# Patient Record
Sex: MALE | Race: WHITE | NOT HISPANIC OR LATINO | Employment: OTHER | URBAN - METROPOLITAN AREA
[De-identification: names, ages, dates, MRNs, and addresses within clinical notes are randomized per-mention and may not be internally consistent; named-entity substitution may affect disease eponyms.]

---

## 2023-12-29 ENCOUNTER — HOSPITAL ENCOUNTER (INPATIENT)
Facility: HOSPITAL | Age: 88
LOS: 9 days | Discharge: HOME HEALTH | End: 2024-01-07
Attending: INTERNAL MEDICINE
Payer: MEDICARE

## 2023-12-29 ENCOUNTER — APPOINTMENT (OUTPATIENT)
Dept: CARDIOLOGY | Facility: HOSPITAL | Age: 88
End: 2023-12-29
Payer: MEDICARE

## 2023-12-29 DIAGNOSIS — Z09 FOLLOW-UP EXAM: Primary | ICD-10-CM

## 2023-12-29 DIAGNOSIS — J96.01 ACUTE RESPIRATORY FAILURE WITH HYPOXIA: ICD-10-CM

## 2023-12-29 DIAGNOSIS — J90 PLEURAL EFFUSION: ICD-10-CM

## 2023-12-29 DIAGNOSIS — R06.02 SHORTNESS OF BREATH: ICD-10-CM

## 2023-12-29 PROBLEM — R79.89 ELEVATED TROPONIN: Status: ACTIVE | Noted: 2023-12-29

## 2023-12-29 PROBLEM — D64.9 ANEMIA: Status: ACTIVE | Noted: 2023-12-29

## 2023-12-29 PROBLEM — I10 HTN (HYPERTENSION): Status: ACTIVE | Noted: 2023-12-29

## 2023-12-29 PROBLEM — N18.9 CKD (CHRONIC KIDNEY DISEASE): Status: ACTIVE | Noted: 2023-12-29

## 2023-12-29 PROBLEM — I48.91 ATRIAL FIBRILLATION: Status: ACTIVE | Noted: 2023-12-29

## 2023-12-29 PROBLEM — D69.6 THROMBOCYTOPENIA: Status: ACTIVE | Noted: 2023-12-29

## 2023-12-29 PROBLEM — R07.9 CHEST PAIN: Status: ACTIVE | Noted: 2023-12-29

## 2023-12-29 PROBLEM — R91.8 LUNG MASS: Status: ACTIVE | Noted: 2023-12-29

## 2023-12-29 LAB
ANION GAP SERPL CALCULATED.3IONS-SCNC: 8.9 MMOL/L (ref 5–15)
AORTIC ARCH: 2.4 CM
AORTIC DIMENSIONLESS INDEX: 0.7 (DI)
ASCENDING AORTA: 3.2 CM
BASOPHILS # BLD AUTO: 0.05 10*3/MM3 (ref 0–0.2)
BASOPHILS NFR BLD AUTO: 0.7 % (ref 0–1.5)
BH CV ECHO MEAS - ACS: 2.3 CM
BH CV ECHO MEAS - AO MAX PG: 15.8 MMHG
BH CV ECHO MEAS - AO MEAN PG: 7.3 MMHG
BH CV ECHO MEAS - AO ROOT DIAM: 3.5 CM
BH CV ECHO MEAS - AO V2 MAX: 198.9 CM/SEC
BH CV ECHO MEAS - AO V2 VTI: 39.6 CM
BH CV ECHO MEAS - AVA(I,D): 3.3 CM2
BH CV ECHO MEAS - EDV(CUBED): 188.8 ML
BH CV ECHO MEAS - EDV(MOD-SP2): 158 ML
BH CV ECHO MEAS - EDV(MOD-SP4): 253 ML
BH CV ECHO MEAS - EF(MOD-BP): 40.4 %
BH CV ECHO MEAS - EF(MOD-SP2): 31.6 %
BH CV ECHO MEAS - EF(MOD-SP4): 42.7 %
BH CV ECHO MEAS - ESV(CUBED): 116.2 ML
BH CV ECHO MEAS - ESV(MOD-SP2): 108 ML
BH CV ECHO MEAS - ESV(MOD-SP4): 145 ML
BH CV ECHO MEAS - FS: 14.9 %
BH CV ECHO MEAS - IVS/LVPW: 1.28 CM
BH CV ECHO MEAS - IVSD: 1.35 CM
BH CV ECHO MEAS - LAT PEAK E' VEL: 11.3 CM/SEC
BH CV ECHO MEAS - LV DIASTOLIC VOL/BSA (35-75): 118.3 CM2
BH CV ECHO MEAS - LV MASS(C)D: 292 GRAMS
BH CV ECHO MEAS - LV MAX PG: 6.8 MMHG
BH CV ECHO MEAS - LV MEAN PG: 4 MMHG
BH CV ECHO MEAS - LV SYSTOLIC VOL/BSA (12-30): 67.8 CM2
BH CV ECHO MEAS - LV V1 MAX: 130 CM/SEC
BH CV ECHO MEAS - LV V1 VTI: 28.6 CM
BH CV ECHO MEAS - LVIDD: 5.7 CM
BH CV ECHO MEAS - LVIDS: 4.9 CM
BH CV ECHO MEAS - LVOT AREA: 4.6 CM2
BH CV ECHO MEAS - LVOT DIAM: 2.41 CM
BH CV ECHO MEAS - LVPWD: 1.06 CM
BH CV ECHO MEAS - MED PEAK E' VEL: 6.1 CM/SEC
BH CV ECHO MEAS - MV A DUR: 0.11 SEC
BH CV ECHO MEAS - MV A MAX VEL: 116.9 CM/SEC
BH CV ECHO MEAS - MV DEC SLOPE: 366.8 CM/SEC2
BH CV ECHO MEAS - MV DEC TIME: 0.25 SEC
BH CV ECHO MEAS - MV E MAX VEL: 82.3 CM/SEC
BH CV ECHO MEAS - MV E/A: 0.7
BH CV ECHO MEAS - MV MAX PG: 6.9 MMHG
BH CV ECHO MEAS - MV MEAN PG: 2.7 MMHG
BH CV ECHO MEAS - MV P1/2T: 101.2 MSEC
BH CV ECHO MEAS - MV V2 VTI: 36.4 CM
BH CV ECHO MEAS - MVA(P1/2T): 2.17 CM2
BH CV ECHO MEAS - MVA(VTI): 3.6 CM2
BH CV ECHO MEAS - PA ACC TIME: 0.11 SEC
BH CV ECHO MEAS - PA V2 MAX: 150.8 CM/SEC
BH CV ECHO MEAS - PULM A REVS DUR: 0.13 SEC
BH CV ECHO MEAS - PULM A REVS VEL: 36.9 CM/SEC
BH CV ECHO MEAS - PULM DIAS VEL: 40.5 CM/SEC
BH CV ECHO MEAS - PULM S/D: 1.24
BH CV ECHO MEAS - PULM SYS VEL: 50.1 CM/SEC
BH CV ECHO MEAS - QP/QS: 0.86
BH CV ECHO MEAS - RAP SYSTOLE: 3 MMHG
BH CV ECHO MEAS - RV MAX PG: 4.1 MMHG
BH CV ECHO MEAS - RV V1 MAX: 101.1 CM/SEC
BH CV ECHO MEAS - RV V1 VTI: 18.9 CM
BH CV ECHO MEAS - RVOT DIAM: 2.7 CM
BH CV ECHO MEAS - RVSP: 45 MMHG
BH CV ECHO MEAS - SI(MOD-SP2): 23.4 ML/M2
BH CV ECHO MEAS - SI(MOD-SP4): 50.5 ML/M2
BH CV ECHO MEAS - SV(LVOT): 130 ML
BH CV ECHO MEAS - SV(MOD-SP2): 50 ML
BH CV ECHO MEAS - SV(MOD-SP4): 108 ML
BH CV ECHO MEAS - SV(RVOT): 111.4 ML
BH CV ECHO MEAS - TR MAX PG: 42 MMHG
BH CV ECHO MEAS - TR MAX VEL: 324 CM/SEC
BH CV ECHO MEASUREMENTS AVERAGE E/E' RATIO: 9.46
BH CV ECHO SHUNT ASSESSMENT PERFORMED (HIDDEN SCRIPTING): 1
BH CV XLRA - RV BASE: 4.6 CM
BH CV XLRA - RV LENGTH: 8.6 CM
BH CV XLRA - RV MID: 4.5 CM
BH CV XLRA - TDI S': 20.8 CM/SEC
BUN SERPL-MCNC: 32 MG/DL (ref 8–23)
BUN/CREAT SERPL: 21.3 (ref 7–25)
CALCIUM SPEC-SCNC: 8.9 MG/DL (ref 8.6–10.5)
CHLORIDE SERPL-SCNC: 105 MMOL/L (ref 98–107)
CO2 SERPL-SCNC: 25.1 MMOL/L (ref 22–29)
CREAT SERPL-MCNC: 1.5 MG/DL (ref 0.76–1.27)
DEPRECATED RDW RBC AUTO: 54 FL (ref 37–54)
EGFRCR SERPLBLD CKD-EPI 2021: 44.2 ML/MIN/1.73
EOSINOPHIL # BLD AUTO: 0.19 10*3/MM3 (ref 0–0.4)
EOSINOPHIL NFR BLD AUTO: 2.6 % (ref 0.3–6.2)
ERYTHROCYTE [DISTWIDTH] IN BLOOD BY AUTOMATED COUNT: 14.9 % (ref 12.3–15.4)
GEN 5 2HR TROPONIN T REFLEX: 508 NG/L
GLUCOSE SERPL-MCNC: 98 MG/DL (ref 65–99)
HCT VFR BLD AUTO: 36.4 % (ref 37.5–51)
HGB BLD-MCNC: 12.5 G/DL (ref 13–17.7)
IMM GRANULOCYTES # BLD AUTO: 0.03 10*3/MM3 (ref 0–0.05)
IMM GRANULOCYTES NFR BLD AUTO: 0.4 % (ref 0–0.5)
INR PPP: 3.6 (ref 0.9–1.1)
L PNEUMO1 AG UR QL IA: NEGATIVE
LEFT ATRIUM VOLUME INDEX: 45.2 ML/M2
LYMPHOCYTES # BLD AUTO: 0.88 10*3/MM3 (ref 0.7–3.1)
LYMPHOCYTES NFR BLD AUTO: 11.9 % (ref 19.6–45.3)
MAGNESIUM SERPL-MCNC: 2.2 MG/DL (ref 1.6–2.4)
MCH RBC QN AUTO: 33.7 PG (ref 26.6–33)
MCHC RBC AUTO-ENTMCNC: 34.3 G/DL (ref 31.5–35.7)
MCV RBC AUTO: 98.1 FL (ref 79–97)
MONOCYTES # BLD AUTO: 0.47 10*3/MM3 (ref 0.1–0.9)
MONOCYTES NFR BLD AUTO: 6.4 % (ref 5–12)
MRSA DNA SPEC QL NAA+PROBE: NORMAL
NEUTROPHILS NFR BLD AUTO: 5.76 10*3/MM3 (ref 1.7–7)
NEUTROPHILS NFR BLD AUTO: 78 % (ref 42.7–76)
NRBC BLD AUTO-RTO: 0 /100 WBC (ref 0–0.2)
PLATELET # BLD AUTO: 105 10*3/MM3 (ref 140–450)
PMV BLD AUTO: 10.5 FL (ref 6–12)
POTASSIUM SERPL-SCNC: 4.3 MMOL/L (ref 3.5–5.2)
PROCALCITONIN SERPL-MCNC: 0.15 NG/ML (ref 0–0.25)
PROTHROMBIN TIME: 36.8 SECONDS (ref 11.7–14.2)
QT INTERVAL: 425 MS
QTC INTERVAL: 459 MS
RBC # BLD AUTO: 3.71 10*6/MM3 (ref 4.14–5.8)
S PNEUM AG SPEC QL LA: NEGATIVE
SINUS: 3.1 CM
SODIUM SERPL-SCNC: 139 MMOL/L (ref 136–145)
STJ: 2.6 CM
TROPONIN T DELTA: 35 NG/L
TROPONIN T SERPL HS-MCNC: 473 NG/L
WBC NRBC COR # BLD AUTO: 7.38 10*3/MM3 (ref 3.4–10.8)

## 2023-12-29 PROCEDURE — 25810000003 SODIUM CHLORIDE 0.9 % SOLUTION: Performed by: NURSE PRACTITIONER

## 2023-12-29 PROCEDURE — 85025 COMPLETE CBC W/AUTO DIFF WBC: CPT | Performed by: NURSE PRACTITIONER

## 2023-12-29 PROCEDURE — 87449 NOS EACH ORGANISM AG IA: CPT | Performed by: STUDENT IN AN ORGANIZED HEALTH CARE EDUCATION/TRAINING PROGRAM

## 2023-12-29 PROCEDURE — 99222 1ST HOSP IP/OBS MODERATE 55: CPT | Performed by: INTERNAL MEDICINE

## 2023-12-29 PROCEDURE — 93306 TTE W/DOPPLER COMPLETE: CPT

## 2023-12-29 PROCEDURE — 84484 ASSAY OF TROPONIN QUANT: CPT | Performed by: NURSE PRACTITIONER

## 2023-12-29 PROCEDURE — 93010 ELECTROCARDIOGRAM REPORT: CPT | Performed by: INTERNAL MEDICINE

## 2023-12-29 PROCEDURE — 80048 BASIC METABOLIC PNL TOTAL CA: CPT | Performed by: NURSE PRACTITIONER

## 2023-12-29 PROCEDURE — 97162 PT EVAL MOD COMPLEX 30 MIN: CPT

## 2023-12-29 PROCEDURE — 87899 AGENT NOS ASSAY W/OPTIC: CPT | Performed by: STUDENT IN AN ORGANIZED HEALTH CARE EDUCATION/TRAINING PROGRAM

## 2023-12-29 PROCEDURE — 83735 ASSAY OF MAGNESIUM: CPT | Performed by: NURSE PRACTITIONER

## 2023-12-29 PROCEDURE — 87641 MR-STAPH DNA AMP PROBE: CPT | Performed by: STUDENT IN AN ORGANIZED HEALTH CARE EDUCATION/TRAINING PROGRAM

## 2023-12-29 PROCEDURE — 25510000001 PERFLUTREN (DEFINITY) 8.476 MG IN SODIUM CHLORIDE (PF) 0.9 % 10 ML INJECTION: Performed by: INTERNAL MEDICINE

## 2023-12-29 PROCEDURE — 97530 THERAPEUTIC ACTIVITIES: CPT

## 2023-12-29 PROCEDURE — 93005 ELECTROCARDIOGRAM TRACING: CPT | Performed by: NURSE PRACTITIONER

## 2023-12-29 PROCEDURE — 85610 PROTHROMBIN TIME: CPT | Performed by: INTERNAL MEDICINE

## 2023-12-29 PROCEDURE — 93306 TTE W/DOPPLER COMPLETE: CPT | Performed by: INTERNAL MEDICINE

## 2023-12-29 PROCEDURE — 84145 PROCALCITONIN (PCT): CPT | Performed by: STUDENT IN AN ORGANIZED HEALTH CARE EDUCATION/TRAINING PROGRAM

## 2023-12-29 RX ORDER — SODIUM CHLORIDE 9 MG/ML
100 INJECTION, SOLUTION INTRAVENOUS CONTINUOUS
Status: DISCONTINUED | OUTPATIENT
Start: 2023-12-29 | End: 2023-12-29

## 2023-12-29 RX ORDER — POLYETHYLENE GLYCOL 3350 17 G/17G
17 POWDER, FOR SOLUTION ORAL DAILY PRN
Status: DISCONTINUED | OUTPATIENT
Start: 2023-12-29 | End: 2024-01-07 | Stop reason: HOSPADM

## 2023-12-29 RX ORDER — WARFARIN SODIUM 3 MG/1
0.5 TABLET ORAL DAILY
COMMUNITY
Start: 2023-12-07 | End: 2024-01-07 | Stop reason: HOSPADM

## 2023-12-29 RX ORDER — ACETAMINOPHEN 650 MG/1
650 SUPPOSITORY RECTAL EVERY 4 HOURS PRN
Status: DISCONTINUED | OUTPATIENT
Start: 2023-12-29 | End: 2024-01-07 | Stop reason: HOSPADM

## 2023-12-29 RX ORDER — SODIUM CHLORIDE 9 MG/ML
40 INJECTION, SOLUTION INTRAVENOUS AS NEEDED
Status: DISCONTINUED | OUTPATIENT
Start: 2023-12-29 | End: 2024-01-07 | Stop reason: HOSPADM

## 2023-12-29 RX ORDER — ASPIRIN 81 MG/1
81 TABLET ORAL DAILY
Status: DISCONTINUED | OUTPATIENT
Start: 2023-12-29 | End: 2024-01-07 | Stop reason: HOSPADM

## 2023-12-29 RX ORDER — FUROSEMIDE 20 MG/1
TABLET ORAL
COMMUNITY
Start: 2023-12-07 | End: 2024-01-07 | Stop reason: HOSPADM

## 2023-12-29 RX ORDER — ALLOPURINOL 100 MG/1
100 TABLET ORAL DAILY
COMMUNITY
Start: 2023-12-07

## 2023-12-29 RX ORDER — ATENOLOL 25 MG/1
12.5 TABLET ORAL 2 TIMES DAILY
COMMUNITY
Start: 2023-12-26 | End: 2024-01-07 | Stop reason: HOSPADM

## 2023-12-29 RX ORDER — BISACODYL 10 MG
10 SUPPOSITORY, RECTAL RECTAL DAILY PRN
Status: DISCONTINUED | OUTPATIENT
Start: 2023-12-29 | End: 2024-01-07 | Stop reason: HOSPADM

## 2023-12-29 RX ORDER — ACETAMINOPHEN 325 MG/1
650 TABLET ORAL EVERY 4 HOURS PRN
Status: DISCONTINUED | OUTPATIENT
Start: 2023-12-29 | End: 2024-01-07 | Stop reason: HOSPADM

## 2023-12-29 RX ORDER — SODIUM CHLORIDE 0.9 % (FLUSH) 0.9 %
10 SYRINGE (ML) INJECTION AS NEEDED
Status: DISCONTINUED | OUTPATIENT
Start: 2023-12-29 | End: 2024-01-07 | Stop reason: HOSPADM

## 2023-12-29 RX ORDER — ACETAMINOPHEN 160 MG/5ML
650 SOLUTION ORAL EVERY 4 HOURS PRN
Status: DISCONTINUED | OUTPATIENT
Start: 2023-12-29 | End: 2024-01-07 | Stop reason: HOSPADM

## 2023-12-29 RX ORDER — BISACODYL 5 MG/1
5 TABLET, DELAYED RELEASE ORAL DAILY PRN
Status: DISCONTINUED | OUTPATIENT
Start: 2023-12-29 | End: 2024-01-07 | Stop reason: HOSPADM

## 2023-12-29 RX ORDER — AMOXICILLIN 250 MG
2 CAPSULE ORAL 2 TIMES DAILY
Status: DISCONTINUED | OUTPATIENT
Start: 2023-12-29 | End: 2024-01-07 | Stop reason: HOSPADM

## 2023-12-29 RX ORDER — ONDANSETRON 2 MG/ML
4 INJECTION INTRAMUSCULAR; INTRAVENOUS EVERY 6 HOURS PRN
Status: DISCONTINUED | OUTPATIENT
Start: 2023-12-29 | End: 2024-01-07 | Stop reason: HOSPADM

## 2023-12-29 RX ORDER — ATENOLOL 25 MG/1
12.5 TABLET ORAL 2 TIMES DAILY
Status: DISCONTINUED | OUTPATIENT
Start: 2023-12-29 | End: 2023-12-29

## 2023-12-29 RX ORDER — SODIUM CHLORIDE 0.9 % (FLUSH) 0.9 %
10 SYRINGE (ML) INJECTION EVERY 12 HOURS SCHEDULED
Status: DISCONTINUED | OUTPATIENT
Start: 2023-12-29 | End: 2024-01-07 | Stop reason: HOSPADM

## 2023-12-29 RX ORDER — ALLOPURINOL 100 MG/1
100 TABLET ORAL DAILY
Status: DISCONTINUED | OUTPATIENT
Start: 2023-12-29 | End: 2024-01-07 | Stop reason: HOSPADM

## 2023-12-29 RX ORDER — MONTELUKAST SODIUM 10 MG/1
10 TABLET ORAL NIGHTLY
COMMUNITY
Start: 2023-12-07

## 2023-12-29 RX ORDER — SPIRONOLACTONE 25 MG/1
25 TABLET ORAL DAILY
COMMUNITY
Start: 2023-11-17 | End: 2024-01-07 | Stop reason: HOSPADM

## 2023-12-29 RX ORDER — NITROGLYCERIN 0.4 MG/1
0.4 TABLET SUBLINGUAL
Status: DISCONTINUED | OUTPATIENT
Start: 2023-12-29 | End: 2024-01-07 | Stop reason: HOSPADM

## 2023-12-29 RX ORDER — CARVEDILOL 12.5 MG/1
12.5 TABLET ORAL 2 TIMES DAILY WITH MEALS
Status: DISCONTINUED | OUTPATIENT
Start: 2023-12-29 | End: 2024-01-07 | Stop reason: HOSPADM

## 2023-12-29 RX ADMIN — Medication 10 ML: at 11:14

## 2023-12-29 RX ADMIN — ALLOPURINOL 100 MG: 100 TABLET ORAL at 11:09

## 2023-12-29 RX ADMIN — SODIUM CHLORIDE 100 ML/HR: 9 INJECTION, SOLUTION INTRAVENOUS at 05:58

## 2023-12-29 RX ADMIN — ATENOLOL 12.5 MG: 25 TABLET ORAL at 11:09

## 2023-12-29 RX ADMIN — CARVEDILOL 12.5 MG: 12.5 TABLET, FILM COATED ORAL at 20:04

## 2023-12-29 RX ADMIN — Medication 10 ML: at 20:08

## 2023-12-29 RX ADMIN — PERFLUTREN 2 ML: 6.52 INJECTION, SUSPENSION INTRAVENOUS at 12:56

## 2023-12-29 RX ADMIN — ASPIRIN 81 MG: 81 TABLET, COATED ORAL at 11:09

## 2023-12-29 NOTE — PLAN OF CARE
Goal Outcome Evaluation:         Pt very pleasant; no c/o pain; VSS; A/Ox4; POC is waiting for INR to lower for possible C Cath; per MD notes - Pulm expected to repeat CT to confirm new mass in L lung; Family in room; anticipate pt be stay at least 3 days;    Continue to monitor

## 2023-12-29 NOTE — H&P
Patient Name:  Derian Hendricks  YOB: 1934  MRN:  [id number]  Admit Date:  12/29/2023  Patient Care Team:  Provider, No Known as PCP - General      Subjective   History Present Illness     Mr. Hendricks is a 89 y.o. male with a history of CHF, CAD, diabetes, GERD, and HTN that presents to [facility] complaining of shortness of breath.  The patient was initially seen at an outlying facility for shortness of breath.  States that the shortness of breath has progressed over the past week.  He has had some intermittent chest tightness, but is not having it currently.  States that he is coughing some but cough is nonproductive.  No fevers or chills.  Has been significant weight loss.  No vomiting or diarrhea.  The patient has never smoked.  At the outside hospital, the patient was found to have a large right pleural effusion, left lung mass, and elevated troponin.  Initial troponin was reportedly 743 with repeat 823.  Reportedly no significant EKG changes.  CTA chest was attempted but the IV unfortunately did not last.  Given the elevated troponin and abnormal lung findings, the patient was transferred to [facility] for admission and evaluation by pulmonology and cardiology.    Personal History     Past Medical History:   Diagnosis Date   • CHF (congestive heart failure)    • Coronary artery disease    • Diabetes mellitus    • GERD (gastroesophageal reflux disease)    • Gout    • Hypertension      Past Surgical History:   Procedure Laterality Date   • CHOLECYSTECTOMY     • CORONARY ANGIOPLASTY       History reviewed. No pertinent family history.  Social History     Tobacco Use   • Smoking status: Never     Passive exposure: Never   • Smokeless tobacco: Never   Vaping Use   • Vaping Use: Never used   Substance Use Topics   • Alcohol use: Never   • Drug use: Never     No current facility-administered medications on file prior to encounter.     Current Outpatient Medications  on File Prior to Encounter   Medication Sig Dispense Refill   • allopurinol (ZYLOPRIM) 100 MG tablet Take 1 tablet by mouth Daily.     • atenolol (TENORMIN) 25 MG tablet Take 0.5 tablets by mouth 2 (Two) Times a Day.     • furosemide (LASIX) 20 MG tablet 1 TAB ORAL TWICE A DAY     • metFORMIN (GLUCOPHAGE) 500 MG tablet Take 1 tablet by mouth Daily With Breakfast.     • montelukast (SINGULAIR) 10 MG tablet Take 1 tablet by mouth Every Night.     • spironolactone (ALDACTONE) 25 MG tablet Take 1 tablet by mouth Daily.     • warfarin (COUMADIN) 3 MG tablet Take 0.5 mg by mouth Daily.       No Known Allergies    Objective    Objective     Vital Signs  Temp:  [98 °F (36.7 °C)-98.8 °F (37.1 °C)] 98.8 °F (37.1 °C)  Heart Rate:  [67-90] 73  Resp:  [14-18] 14  BP: (133-141)/(52-70) 133/52  SpO2:  [94 %-99 %] 98 %  on  Flow (L/min):  [3] 3;   Device (Oxygen Therapy): nasal cannula  Body mass index is 32.18 kg/m².    Physical Exam  General: Alert, no acute distress.  Lying in bed.  Comfortable appearing.  Nasal cannula in place.  ENT: No conjunctival injection or scleral icterus. Moist mucous membranes.   Neuro: Eyes open and moving in all directions, strength normal in all extremities, no focal deficits.   Lungs: Diminished breath sounds bilaterally.  No focal crackles or wheezes noted.  Overall comfortable appearing.  Heart: RRR, no murmurs.   Abdomen: Soft, non-tender, non-distended. Normal bowel sounds.   Ext: 1+ bilateral lower extremity edema, chronic venous stasis changes.  Skin: No rashes or lesions. IV site without swelling or erythema.     Lab Results (last 24 hours)       Procedure Component Value Units Date/Time    Basic Metabolic Panel [[id number]]  (Abnormal) Collected: 12/29/23 0536    Specimen: Blood Updated: 12/29/23 0608     Glucose 98 mg/dL      BUN 32 mg/dL      Creatinine 1.50 mg/dL      Sodium 139 mmol/L      Potassium 4.3 mmol/L      Chloride 105 mmol/L      CO2 25.1 mmol/L      Calcium 8.9 mg/dL       BUN/Creatinine Ratio 21.3     Anion Gap 8.9 mmol/L      eGFR 44.2 mL/min/1.73     Narrative:      GFR Normal >60  Chronic Kidney Disease <60  Kidney Failure <15    The GFR formula is only valid for adults with stable renal function between ages 18 and 70.    CBC Auto Differential [[id number]]  (Abnormal) Collected: 12/29/23 0536    Specimen: Blood Updated: 12/29/23 0549     WBC 7.38 10*3/mm3      RBC 3.71 10*6/mm3      Hemoglobin 12.5 g/dL      Hematocrit 36.4 %      MCV 98.1 fL      MCH 33.7 pg      MCHC 34.3 g/dL      RDW 14.9 %      RDW-SD 54.0 fl      MPV 10.5 fL      Platelets 105 10*3/mm3      Neutrophil % 78.0 %      Lymphocyte % 11.9 %      Monocyte % 6.4 %      Eosinophil % 2.6 %      Basophil % 0.7 %      Immature Grans % 0.4 %      Neutrophils, Absolute 5.76 10*3/mm3      Lymphocytes, Absolute 0.88 10*3/mm3      Monocytes, Absolute 0.47 10*3/mm3      Eosinophils, Absolute 0.19 10*3/mm3      Basophils, Absolute 0.05 10*3/mm3      Immature Grans, Absolute 0.03 10*3/mm3      nRBC 0.0 /100 WBC     Magnesium [[id number]]  (Normal) Collected: 12/29/23 0536    Specimen: Blood Updated: 12/29/23 0608     Magnesium 2.2 mg/dL     High Sensitivity Troponin T [[id number]]  (Abnormal) Collected: 12/29/23 0536    Specimen: Blood Updated: 12/29/23 0612     HS Troponin T 473 ng/L     Narrative:      High Sensitive Troponin T Reference Range:  <14.0 ng/L- Negative Female for AMI  <22.0 ng/L- Negative Male for AMI  >=14 - Abnormal Female indicating possible myocardial injury.  >=22 - Abnormal Male indicating possible myocardial injury.   Clinicians would have to utilize clinical acumen, EKG, Troponin, and serial changes to determine if it is an Acute Myocardial Infarction or myocardial injury due to an underlying chronic condition.         High Sensitivity Troponin T 2Hr [[id number]]  (Abnormal) Collected: 12/29/23 0728    Specimen: Blood Updated: 12/29/23 0830     HS Troponin T 508 ng/L      Troponin T Delta 35 ng/L      Narrative:      High Sensitive Troponin T Reference Range:  <14.0 ng/L- Negative Female for AMI  <22.0 ng/L- Negative Male for AMI  >=14 - Abnormal Female indicating possible myocardial injury.  >=22 - Abnormal Male indicating possible myocardial injury.   Clinicians would have to utilize clinical acumen, EKG, Troponin, and serial changes to determine if it is an Acute Myocardial Infarction or myocardial injury due to an underlying chronic condition.                 Imaging Results (Last 24 Hours)       ** No results found for the last 24 hours. **              ECG 12 Lead Chest Pain   Preliminary Result   HEART RATE= 70  bpm   RR Interval= 857  ms   WV Interval= 178  ms   P Horizontal Axis= -4  deg   P Front Axis= 22  deg   QRSD Interval= 155  ms   QT Interval= 425  ms   QTcB= 459  ms   QRS Axis= 48  deg   T Wave Axis= -1  deg   - ABNORMAL ECG -   Sinus rhythm   Atrial premature complex   IVCD, consider atypical LBBB   Electronically Signed By:    Date and Time of Study: 2023-12-29 04:50:24      SCANNED - TELEMETRY     Final Result        Assessment/Plan   Assessment & Plan   Active Hospital Problems    Diagnosis  POA   • **Acute respiratory failure with hypoxia [J96.01]  Unknown   • Chest pain [R07.9]  Yes   • Pleural effusion [J90]  Yes   • CKD (chronic kidney disease) [N18.9]  Yes   • HTN (hypertension) [I10]  Unknown   • Shortness of breath [R06.02]  Yes   • Lung mass [R91.8]  Unknown   • Elevated troponin [R79.89]  Unknown   • Atrial fibrillation [I48.91]  Unknown   • Anemia [D64.9]  Unknown   • Thrombocytopenia [D69.6]  Unknown      Resolved Hospital Problems   No resolved problems to display.       89 y.o. male admitted with Acute respiratory failure with hypoxia.    Acute hypoxic respiratory failure  Shortness of breath  Pleural effusion  New lung mass  -Imaging from outside hospital reportedly with new left lung mass and large right-sided pleural effusion  -Currently on 3 L nasal cannula, oxygen  supplementation as needed to maintain saturations above 90%  -WBC normal, patient afebrile so hold off on antibiotics for now  -Will check procalcitonin, strep pneumo, Legionella, and MRSA nares  -Attempting to obtain imaging from outside hospital    Elevated troponin  Chest pain  -EKG here with possible LBBB, no priors for comparison  -Troponin elevated at 473, increased to 508 on recheck  -Cardiology consulted, evaluation pending    CKD  -Creatinine 1.50 on arrival, unknown baseline  -Avoid nephrotoxic agents including contrast and NSAIDs  -Patient reportedly received Lasix and some contrast before IV went bad  -Closely monitor renal function with daily BMP  -If heart catheterization is needed, will start some gentle IV fluids    HTN  Atrial fibrillation  CHF  -BP acceptable acutely  -Continue atenolol  -Hold warfarin, as patient may need procedure  -Hold Lasix and spironolactone for now  -Cardiology consulted as above, appreciate their assistance  -Will likely need repeat echocardiogram but will await cardiology evaluation  -Check INR     Anemia  Thrombocytopenia  -Hgb 12.5, platelets 105 on arrival  -Unknown what patient's baseline is, prior labs in our system  -No signs of active bleeding  -Monitor with daily CBC, transfuse for Hgb less than 7    SCDs for DVT prophylaxis.  Full code.  Discussed with patient, family, and nursing staff.      María Ordaz MD  [facility]  12/29/23  09:24 EST

## 2023-12-29 NOTE — PLAN OF CARE
Goal Outcome Evaluation:  Plan of Care Reviewed With: patient, family           Outcome Evaluation: Pt is a 89 y/o M who presented to [facility] with c/o chest pain and found with pleural effusion, subsequently transferred to [facility] for further evaluation. Pt reports living alone, being independent at baseline without use of AD inside the home, but does use a STC when outside of the home and has good family support. Pt is currently SBA for bed mobility, STS and ambulation of 120' without AD. Pt was on 2L O2 via NC on PT arrival and trialed sitting/standing on room air, however pt desatted to 88% with static standing. Pt was placed back on 2L O2 NC with quick recovery to 92-95% and ambulated into the hallway. During ambulation on 2L pts SpO2 dropped to 88% at the lowest, but had quick recovery into 90s once seated/resting. Pt reports feeling at his baseline other than the mild SOB and chest tightness. Pt does present with small decrease in endurance and strength limiting his mobility and would benefit from skilled PT services to address these deficits. Currently rec home w/ family assist. Discussed HH PT, but pt politely declined. Educated on how to obtain referral for HH PT once home.      Anticipated Discharge Disposition (PT): home with assist

## 2023-12-29 NOTE — THERAPY EVALUATION
Patient Name: Derian Hendricks  : 1934    MRN: [id number]                              Today's Date: 2023       Admit Date: 2023    Visit Dx: No diagnosis found.  Patient Active Problem List   Diagnosis    Chest pain    Pleural effusion    CKD (chronic kidney disease)    HTN (hypertension)    Shortness of breath    Acute respiratory failure with hypoxia    Lung mass    Elevated troponin    Atrial fibrillation    Anemia    Thrombocytopenia     Past Medical History:   Diagnosis Date    Atrial fibrillation     CHF (congestive heart failure)     Coronary artery disease     Diabetes mellitus     GERD (gastroesophageal reflux disease)     Gout     Hypertension      Past Surgical History:   Procedure Laterality Date    CHOLECYSTECTOMY      CORONARY ANGIOPLASTY        General Information       Row Name 23 1511          Physical Therapy Time and Intention    Document Type evaluation  -MG     Mode of Treatment individual therapy;physical therapy  -MG       Row Name 23 1511          General Information    Patient Profile Reviewed yes  -MG     Prior Level of Function independent:  No AD inside the home. Uses STC outside of the house.  -MG     Existing Precautions/Restrictions oxygen therapy device and L/min  Only wears O2 at night. Currently on 2L O2 via NC.  -MG     Barriers to Rehab medically complex  -MG       Row Name 23 1511          Living Environment    People in Home alone  -MG       Row Name 23 1511          Home Main Entrance    Number of Stairs, Main Entrance one  -MG     Stair Railings, Main Entrance railings safe and in good condition;railing on right side (ascending)  -MG       Row Name 23 1511          Stairs Within Home, Primary    Number of Stairs, Within Home, Primary none  -MG       Row Name 23 1511          Cognition    Orientation Status (Cognition) oriented x 4  -MG       Row Name 23 1511          Safety Issues, Functional Mobility     Impairments Affecting Function (Mobility) endurance/activity tolerance;shortness of breath;strength  -MG     Comment, Safety Issues/Impairments (Mobility) Gait belt and non-skid socks donned.  -MG               User Key  (r) = Recorded By, (t) = Taken By, (c) = Cosigned By      Initials Name Provider Type    Ignacia Elder PT Physical Therapist                   Mobility       Row Name 12/29/23 1512          Bed Mobility    Bed Mobility supine-sit  -MG     Supine-Sit [place] (Bed Mobility) standby assist  -MG     Assistive Device (Bed Mobility) head of bed elevated  -MG     Comment, (Bed Mobility) No dizziness on sitting.  -MG       Row Name 12/29/23 1512          Sit-Stand Transfer    Sit-Stand [place] (Transfers) standby assist  -MG     Assistive Device (Sit-Stand Transfers) other (see comments)  no AD  -MG     Comment, (Sit-Stand Transfer) x2 from EOB. On room air initially satting 92%, dropped to 88% with static standing. Placed on 2L prior to 2nd stand and maintaining 92-95%.  -MG       Row Name 12/29/23 1512          Gait/Stairs (Locomotion)    [place] Level (Gait) contact guard;standby assist  -MG     Assistive Device (Gait) other (see comments)  No AD  -MG     Distance in Feet (Gait) 120  -MG     Deviations/Abnormal Patterns (Gait) gait speed decreased;stride length decreased  -MG     Bilateral Gait Deviations forward flexed posture;heel strike decreased  -MG     Comment, (Gait/Stairs) Pt amb in hallway on 2L O2 via NC, dropping to 88%, but quick recovery once seated/resting. Pt had no c/o SOB, but did state having some chest tightness that is similar to what he has been having. No dizziness, knee buckling or LOB.  -MG               User Key  (r) = Recorded By, (t) = Taken By, (c) = Cosigned By      Initials Name Provider Type    Ignacia Elder PT Physical Therapist                   Obj/Interventions       Row Name 12/29/23 1516          Range of Motion Comprehensive    General Range  of Motion bilateral lower extremity ROM WFL  -MG       Row Name 12/29/23 1516          Strength Comprehensive (MMT)    General Manual Muscle Testing (MMT) Assessment lower extremity strength deficits identified  -MG     Comment, General Manual Muscle Testing (MMT) Assessment Generalized weakness. Grossly 4/5.  -MG       Row Name 12/29/23 1516          Balance    Balance Assessment sitting static balance;sitting dynamic balance;standing static balance;standing dynamic balance  -MG     Position, Sitting Balance unsupported;sitting edge of bed  -MG     Static Standing Balance standby assist  -MG     Dynamic Standing Balance contact guard;standby assist  -MG     Position/Device Used, Standing Balance unsupported  -MG     Comment, Balance Static standing at EOB <1 min x2; x1 to assess SpO2 while on RA and x1 to use urinal.  -MG       Row Name 12/29/23 1516          Sensory Assessment (Somatosensory)    Sensory Assessment (Somatosensory) sensation intact  Denies N/T  -MG               User Key  (r) = Recorded By, (t) = Taken By, (c) = Cosigned By      Initials Name Provider Type    MG Ignacia Crisostomo, PT Physical Therapist                   Goals/Plan       Row Name 12/29/23 1525          Bed Mobility Goal 1 (PT)    Activity/Assistive Device (Bed Mobility Goal 1, PT) bed mobility activities, all  -MG     [place] Level/Cues Needed (Bed Mobility Goal 1, PT) independent  -MG     Time Frame (Bed Mobility Goal 1, PT) 1 week  -MG       Row Name 12/29/23 1525          Transfer Goal 1 (PT)    Activity/Assistive Device (Transfer Goal 1, PT) transfers, all  -MG     [place] Level/Cues Needed (Transfer Goal 1, PT) modified independence  -MG     Time Frame (Transfer Goal 1, PT) 1 week  -MG       Row Name 12/29/23 1525          Gait Training Goal 1 (PT)    Activity/Assistive Device (Gait Training Goal 1, PT) gait (walking locomotion)  -MG     [place] Level (Gait Training Goal 1, PT) supervision required;modified  independence  -MG     Distance (Gait Training Goal 1, PT) 120  -MG     Time Frame (Gait Training Goal 1, PT) 1 week  -MG       Row Name 12/29/23 1526          Therapy Assessment/Plan (PT)    Planned Therapy Interventions (PT) balance training;bed mobility training;gait training;home exercise program;transfer training;stretching;strengthening;patient/family education;stair training;ROM (range of motion);neuromuscular re-education  -MG               User Key  (r) = Recorded By, (t) = Taken By, (c) = Cosigned By      Initials Name Provider Type    MG Ignacia Crisostomo, PT Physical Therapist                   Clinical Impression       Row Name 12/29/23 1518          Pain    Pretreatment Pain Rating 0/10 - no pain  -MG     Posttreatment Pain Rating 0/10 - no pain  -MG     Pain Intervention(s) Medication (See MAR);Ambulation/increased activity;Repositioned;Rest  -MG       Row Name 12/29/23 1517          Plan of Care Review    Plan of Care Reviewed With patient;family  -MG     Outcome Evaluation Pt is a 88 y/o M who presented to [facility] with c/o chest pain and found with pleural effusion, subsequently transferred to [facility] for further evaluation. Pt reports living alone, being independent at baseline without use of AD inside the home, but does use a STC when outside of the home and has good family support. Pt is currently SBA for bed mobility, STS and ambulation of 120' without AD. Pt was on 2L O2 via NC on PT arrival and trialed sitting/standing on room air, however pt desatted to 88% with static standing. Pt was placed back on 2L O2 NC with quick recovery to 92-95% and ambulated into the hallway. During ambulation on 2L pts SpO2 dropped to 88% at the lowest, but had quick recovery into 90s once seated/resting. Pt reports feeling at his baseline other than the mild SOB and chest tightness. Pt does present with small decrease in endurance and strength limiting his mobility and would benefit from skilled PT services to address these  deficits. Currently rec home w/ family assist. Discussed HH PT, but pt politely declined. Educated on how to obtain referral for HH PT once home.  -MG       Row Name 12/29/23 1518          Therapy Assessment/Plan (PT)    Rehab Potential (PT) good, to achieve stated therapy goals  -MG     Criteria for Skilled Interventions Met (PT) yes;meets criteria  -MG     Therapy Frequency (PT) 6 times/wk  -MG       Row Name 12/29/23 1518          Vital Signs    Pretreatment Heart Rate (beats/min) 70  -MG     Pre SpO2 (%) 95  2L  -MG     O2 Delivery Pre Treatment nasal cannula  -MG     Intra SpO2 (%) 88  -MG     O2 Delivery Intra Treatment room air  -MG     Post SpO2 (%) 94  -MG     O2 Delivery Post Treatment nasal cannula  -MG     Pre Patient Position Supine  -MG     Intra Patient Position Standing  -MG     Post Patient Position Sitting  -MG       Row Name 12/29/23 1518          Positioning and Restraints    Pre-Treatment Position in bed  -MG     Post Treatment Position bed  -MG     In Bed notified nsg;sitting EOB;call light within reach;encouraged to call for assist;with family/caregiver;side rails up x2  -MG               User Key  (r) = Recorded By, (t) = Taken By, (c) = Cosigned By      Initials Name Provider Type    MG Ignacia Crisostomo, PT Physical Therapist                   Outcome Measures       Row Name 12/29/23 1526 12/29/23 0735       How much help from another person do you currently need...    Turning from your back to your side while in flat bed without using bedrails? 4  -MG 4  -MM    Moving from lying on back to sitting on the side of a flat bed without bedrails? 4  -MG 4  -MM    Moving to and from a bed to a chair (including a wheelchair)? 4  -MG 4  -MM    Standing up from a chair using your arms (e.g., wheelchair, bedside chair)? 4  -MG 4  -MM    Climbing 3-5 steps with a railing? 3  -MG 3  -MM    To walk in hospital room? 3  -MG 4  -MM    AM-PAC 6 Clicks Score (PT) 22  -MG 23  -MM    Highest Level of Mobility  Goal 7 --> Walk 25 feet or more  -MG 7 --> Walk 25 feet or more  -MM              User Key  (r) = Recorded By, (t) = Taken By, (c) = Cosigned By      Initials Name Provider Type    Ignacia Elder PT Physical Therapist    Ashley Aranda, RN Registered Nurse                                 Physical Therapy Education       Title: PT OT SLP Therapies (In Progress)       Topic: Physical Therapy (In Progress)       Point: Mobility training (Not Started)       Learner Progress:  Not documented in this visit.              Point: Home exercise program (Not Started)       Learner Progress:  Not documented in this visit.              Point: Body mechanics (Not Started)       Learner Progress:  Not documented in this visit.              Point: Precautions (Done)       Learning Progress Summary             Patient Acceptance, E, VU by  at 12/29/2023 1527                                         User Key       Initials Effective Dates Name Provider Type Discipline     05/24/22 -  Ignacia Crisostomo PT Physical Therapist PT                  PT Recommendation and Plan  Planned Therapy Interventions (PT): balance training, bed mobility training, gait training, home exercise program, transfer training, stretching, strengthening, patient/family education, stair training, ROM (range of motion), neuromuscular re-education  Plan of Care Reviewed With: patient, family  Outcome Evaluation: Pt is a 88 y/o M who presented to [facility] with c/o chest pain and found with pleural effusion, subsequently transferred to [facility] for further evaluation. Pt reports living alone, being independent at baseline without use of AD inside the home, but does use a STC when outside of the home and has good family support. Pt is currently SBA for bed mobility, STS and ambulation of 120' without AD. Pt was on 2L O2 via NC on PT arrival and trialed sitting/standing on room air, however pt desatted to 88% with static standing. Pt was placed back on 2L O2 NC  with quick recovery to 92-95% and ambulated into the hallway. During ambulation on 2L pts SpO2 dropped to 88% at the lowest, but had quick recovery into 90s once seated/resting. Pt reports feeling at his baseline other than the mild SOB and chest tightness. Pt does present with small decrease in endurance and strength limiting his mobility and would benefit from skilled PT services to address these deficits. Currently rec home w/ family assist. Discussed HH PT, but pt politely declined. Educated on how to obtain referral for HH PT once home.     Time Calculation:         PT Charges       Row Name 12/29/23 1527             Time Calculation    Start Time 1408  -MG      Stop Time 1429  -MG      Time Calculation (min) 21 min  -MG      PT Received On 12/29/23  -MG      PT - Next Appointment 12/30/23  -MG      PT Goal Re-Cert Due Date 01/12/24  -MG         Time Calculation- PT    Total Timed Code Minutes- PT 9 minute(s)  -MG                User Key  (r) = Recorded By, (t) = Taken By, (c) = Cosigned By      Initials Name Provider Type    MG Ignacia Crisostomo, PT Physical Therapist                  Therapy Charges for Today       Code Description Service Date Service Provider Modifiers Qty    51342472853 HC PT EVAL MOD COMPLEXITY 3 12/29/2023 Ignacia Crisostomo, PT GP 1    88199868595 HC PT THERAPEUTIC ACT EA 15 MIN 12/29/2023 Ignacia Crisostomo, PT GP 1            PT G-Codes  AM-PAC 6 Clicks Score (PT): 22  PT Discharge Summary  Anticipated Discharge Disposition (PT): home with assist    Ignacia Crisostomo PT  12/29/2023

## 2023-12-29 NOTE — PLAN OF CARE
Goal Outcome Evaluation:  Plan of Care Reviewed With: patient        Progress: no change  Outcome Evaluation: direct admit from [facility]. admission completed. consults to pulm and cards called. IVF started. labs and ekg obtained, pt resting with family @ bedside.

## 2023-12-29 NOTE — CONSULTS
Patient Care Team:  Provider, No Known as PCP - General      Subjective     Patient is a 89 y.o. male.  Asked to see regarding pleural effusion patient presented to [facility] with chest pain it was pain in the mid lower chest a tightness feeling it has resolved now sounds like it was worse probably 2 days ago.  He has had some shortness of breath as well that may be a little more gradual in onset.  He is not the best historian.  He has had no fevers chills or sweats no sore throat no significant cough.  He has been lying flat sleeping although he has woken up a couple of times the last several days and had to sit on the side of the bed to catch his breath.  No nausea vomiting or diarrhea.  He has a history of CHF and a history of a right pleural effusion has been tapped a couple times in the past the last was 1 or 2 years ago, the family does not know if it was ever studied they do not know what the cause of the effusion was.  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  No history of cancer other than a what sounds like a basal cell skin cancer.  He has lost some weight, he was 217 today and he was 242 a few months ago he says he was at his doctor's office probably 4 months ago maybe.  Maybe a little decreased appetite.  No hemoptysis.      Review of Systems:  No headaches or acute visual changes no bad heartburn or indigestion no history of liver disease hepatitis or yellow jaundice no dysuria hematuria urinary urgency or frequency no polyuria polydipsia no heat or cold intolerance he does stay cold a lot all the time but no chills no night sweats he has had chronic lower extremity edema for years actually now his legs are actually pretty good he says not as swollen as usual.  She did have an MI over 20 years ago he has been on Coumadin since that time apparently the Coumadin is  for his cardiac disease and      History  Past Medical History:   Diagnosis Date    CHF (congestive heart failure)     Coronary artery disease     Diabetes mellitus     GERD (gastroesophageal reflux disease)     Gout     Hypertension      Past Surgical History:   Procedure Laterality Date    CHOLECYSTECTOMY      CORONARY ANGIOPLASTY       Social History     Socioeconomic History    Marital status:    Tobacco Use    Smoking status: Never     Passive exposure: Never    Smokeless tobacco: Never   Vaping Use    Vaping Use: Never used   Substance and Sexual Activity    Alcohol use: Never    Drug use: Never    Sexual activity: Defer     History reviewed. No pertinent family history.      Allergies:  Patient has no known allergies.    Medications:  Prior to Admission medications    Medication Sig Start Date End Date Taking? Authorizing Provider   allopurinol (ZYLOPRIM) 100 MG tablet Take 1 tablet by mouth Daily. 12/7/23  Yes Francesco Herrera MD   atenolol (TENORMIN) 25 MG tablet Take 0.5 tablets by mouth 2 (Two) Times a Day. 12/26/23  Yes Francesco Herrera MD   furosemide (LASIX) 20 MG tablet 1 TAB ORAL TWICE A DAY 12/7/23  Yes Francesco Herrera MD   metFORMIN (GLUCOPHAGE) 500 MG tablet Take 1 tablet by mouth Daily With Breakfast.   Yes ProviderFrancesco MD   montelukast (SINGULAIR) 10 MG tablet Take 1 tablet by mouth Every Night. 12/7/23  Yes ProviderFrancesco MD   spironolactone (ALDACTONE) 25 MG tablet Take 1 tablet by mouth Daily. 11/17/23  Yes ProviderFrancesco MD   warfarin (COUMADIN) 3 MG tablet Take 0.5 mg by mouth Daily. 12/7/23  Yes ProviderFrancesco MD     aspirin, 81 mg, Oral, Daily  senna-docusate sodium, 2 tablet, Oral, BID  sodium chloride, 10 mL, Intravenous, Q12H      sodium chloride, 100 mL/hr        Objective     Vital Signs  Vital Sign Min/Max for last 24 hours  Temp  Min: 98 °F (36.7 °C)  Max: 98 °F (36.7 °C)   BP  Min: 141/60  Max: 141/60   Pulse  Min: 69  Max: 90  "  Resp  Min: 18  Max: 18   SpO2  Min: 94 %  Max: 99 %   Flow (L/min)  Min: 3  Max: 3   Weight  Min: 98.8 kg (217 lb 14.4 oz)  Max: 98.8 kg (217 lb 14.4 oz)     No intake or output data in the 24 hours ending 12/29/23 0356  No intake/output data recorded.  Last Weight and Admission Weight        12/29/23 0314   Weight: 98.8 kg (217 lb 14.4 oz)     Flowsheet Rows      Flowsheet Row First Filed Value   Admission Height 175.3 cm (69\") Documented at 12/29/2023 0314   Admission Weight 98.8 kg (217 lb 14.4 oz) Documented at 12/29/2023 0314            Body mass index is 32.18 kg/m².           Physical Exam:  General Appearance: Well-developed white male he is resting in bed he is on a couple liters nasal cannula O2 oxygen saturation 97% and he does not appear in any acute distress  Eyes: Conjunctiva are clear and anicteric, pupils are equal  ENT: Mucous membranes are moist no erythema no exudates, Mallampati type I airway and nasal septum midline  Neck: No adenopathy no jugular venous distension and trachea midline  Lungs: He has very limited breath sounds on the right mostly in just the apex no rales or no rhonchi the left lung is clear there is dullness three quarters the way up the right chest and no dullness on the left.  He is not laboring on 2 L O2  Cardiac: Regular rate rhythm I do not appreciate a murmur or gallop  Abdomen: Obese soft nontender no palpable hepatosplenomegaly or masses  : Not examined  Musculoskeletal: Mild thoracic kyphosis  Skin: Warm and dry no jaundice no petechiae skin chronic venous stasis changes in the distal lower extremities bilaterally but only minimal edema  Neuro: He is hard of hearing but he is alert and oriented he is cooperative following commands grossly intact  Extremities/P Vascular: No clubbing no cyanosis he has palpable radial and dorsalis pedis pulses  MSE: Pleasant gentleman he is appropriate      Labs:      CrCl cannot be calculated (No successful lab value found.).        " Reviewed some labs from [facility] PT/INR was 3.67 and creatinine was 1.6.  Albumin was mildly decreased total bilirubin was mildly increased I think it was also 1.6.  He did have an elevated troponin it was in the 8 900 range and fell down to the 700 range I believe an EKG showed a left bundle branch block.                          Microbiology Results (last 10 days)       ** No results found for the last 240 hours. **              Diagnostics:  No results found.      [facility] did not send a CD as requested they sent a piece of paper with a QR code to scan I scanned this I have gotten a few images up it takes a couple of minutes for each CT image to download on my phone and then I am looking at a small phone image.  There is another way via code to download organ to see if radiology can get these downloaded and uploaded into PACS for better viewing what I can see is a very large right pleural effusion opacifying all but small apical portion of the right chest and extensive compressive atelectasis there is appears to be a large left upper lobe mass    Assessment & Plan     Elevated troponin after some chest pressure a couple days ago significantly elevated troponin that is following certainly suggestive of a possible coronary event.  Cardiology has been consulted I will defer evaluation to them.  Large right pleural effusion he apparently has had a pleural effusion a couple of times previously that was tapped the last was over a year ago the family does not know the etiology it certainly could be CHF related but also with this left lung mass etc. we would have to concerned about a malignant process as well.  He is probably going to need a thoracentesis for both therapeutic and diagnostic purposes but with his PT/INR excessively prolonged we will have to wait until that improves.  Large left lung mass I really need to see full CT imaging we are trying to get that I do not have a report either but he is going to  need biopsy probably and he will need his PT/INR even lower to allow this safely.  I am not sure the best way to biopsy yet prior to any biopsy though he will probably need to be cleared from a cardiac standpoint for interventional procedure.  Coagulopathy he has been on Coumadin for years for his cardiac disease family is never heard any arrhythmia A-fib type terminology.  I am not entirely clear why he is on the Coumadin.  His INR is supratherapeutic for about any indication think will need to allow to drift down and I would defer to cardiology on anticoagulation while they are working up #1.  Weight loss unintentional this certainly could be related to possible malignancy  Renal insufficiency I do not know his baseline renal function has never been told nor have his children that he has any significant renal disease.  Dyspnea question hypoxia probably related to effusion primarily      Will follow-up with patient tomorrow he needs cardiac clearance and his coags corrected before we can do much of anything      Rush Carmona Jr, MD  12/29/23  03:56 EST    Time:

## 2023-12-29 NOTE — CONSULTS
Patient Name: Derian Hendricks  :1934  89 y.o.    Date of Admission: 2023  Date of Consultation:  23  Encounter Provider: Leana Gaston MD  Place of Service: [facility] CARDIOLOGY  Referring Provider: Paco Latif MD  Patient Care Team:  Provider, No Known as PCP - General      Chief complaint: Non-STEMI    History of Present Illness:    This is a pleasant 89-year-old man with a past medical history of CAD in  with MI reportedly with a history of balloon angioplasty at that time, chronic anticoagulation on Coumadin for unclear reasons, reported diabetes which is diet controlled, CHF, reflux.  He lives 2 hours away in [facility].  He does not see a cardiologist but does follow with a PCP.  His PCP monitors his INR.    Last week he developed chest pain.  He describes as a tightness around his epigastrium and chest.  He has also had shortness of breath.  The shortness of breath is mostly exertional, no orthopnea or PND.  It has progressed over the last week however the chest pain has mostly resolved in the last 24 hours.  He presented to an outlying emergency room in [facility].  At that time his EKG showed left bundle, normal sinus rhythm, PVCs.  Unknown baseline EKG.  Troponin there was 823 peak, proBNP 1900, INR 3.6, creatinine 1.6.  Apparently he had a chest x-ray showing a right-sided effusion and a CTA was ordered to rule out PE presumably, however the IV blew and the CT was not completed.  There is a report that there is a possible underlying lung mass.  I do not see any documentation of this in the chart at the bedside.    He has been transferred here for further evaluation.  This morning the patient is resting comfortably in bed laying flat without dyspnea.  He denies any current angina.  He has chronic lower extremity edema with venous stasis changes which is unchanged over the last several weeks.  He apparently lives alone  and is able to do his ADLs without difficulty.  His memory is good.  He has family at bedside which provide history clarification.  He apparently had a thoracentesis about 2 years ago, unclear what the results of that was.  He states he does not drink much water because he is worried about fluid retention in the lungs.  He thinks he has lost about 30 pounds, however the family is unsure whether this is true.  Past Medical History:   Diagnosis Date    CHF (congestive heart failure)     Coronary artery disease     Diabetes mellitus     GERD (gastroesophageal reflux disease)     Gout     Hypertension        Past Surgical History:   Procedure Laterality Date    CHOLECYSTECTOMY      CORONARY ANGIOPLASTY           Prior to Admission medications    Medication Sig Start Date End Date Taking? Authorizing Provider   allopurinol (ZYLOPRIM) 100 MG tablet Take 1 tablet by mouth Daily. 12/7/23  Yes Francesco Herrera MD   atenolol (TENORMIN) 25 MG tablet Take 0.5 tablets by mouth 2 (Two) Times a Day. 12/26/23  Yes Francesco Herrera MD   furosemide (LASIX) 20 MG tablet 1 TAB ORAL TWICE A DAY 12/7/23  Yes Francesco Herrera MD   metFORMIN (GLUCOPHAGE) 500 MG tablet Take 1 tablet by mouth Daily With Breakfast.   Yes Francesco Herrera MD   montelukast (SINGULAIR) 10 MG tablet Take 1 tablet by mouth Every Night. 12/7/23  Yes Francesco Herrera MD   spironolactone (ALDACTONE) 25 MG tablet Take 1 tablet by mouth Daily. 11/17/23  Yes Francesco Herrera MD   warfarin (COUMADIN) 3 MG tablet Take 0.5 mg by mouth Daily. 12/7/23  Yes Francesco Herrera MD       No Known Allergies    Social History     Socioeconomic History    Marital status:    Tobacco Use    Smoking status: Never     Passive exposure: Never    Smokeless tobacco: Never   Vaping Use    Vaping Use: Never used   Substance and Sexual Activity    Alcohol use: Never    Drug use: Never    Sexual activity: Defer       History reviewed. No pertinent  "family history.    REVIEW OF SYSTEMS:   All systems reviewed.  Pertinent positives identified in HPI.  All other systems are negative.      Objective:     Vitals:    12/29/23 0314 12/29/23 0700 12/29/23 0735 12/29/23 0745   BP: 141/60  140/70 133/52   BP Location: Right arm  Right arm Right arm   Patient Position: Lying  Lying Lying   Pulse: 69 67  73   Resp: 18  14 14   Temp: 98 °F (36.7 °C)   98.8 °F (37.1 °C)   TempSrc: Oral   Oral   SpO2: 99% 97%  98%   Weight: 98.8 kg (217 lb 14.4 oz)      Height: 175.3 cm (69\")        Body mass index is 32.18 kg/m².    General Appearance:  Elderly, bitemporal wasting, poor dentition, BMI 32   Head:    Normocephalic, without obvious abnormality, atraumatic   Eyes:            Lids and lashes normal, conjunctivae and sclerae normal, no icterus, no pallor, corneas clear, PERRLA   Ears:    Ears appear intact with no abnormalities noted   Throat:   No oral lesions, no thrush, oral mucosa moist   Neck:   No adenopathy, supple, trachea midline, no thyromegaly, no carotid bruit, no JVD   Back:    kyphosis present, no scoliosis present, no skin lesions, erythema or scars, no tenderness to percussion or palpation, range of motion normal   Lungs:   Decreased breath sounds right low to mid lung field    Heart:    Regular rhythm and normal rate, normal S1 and S2, no murmur, no gallop, no rub, no click   Chest Wall:    No abnormalities observed   Abdomen:     Normal bowel sounds, no masses, no organomegaly, soft, nontender, nondistended, no guarding, no rebound  tenderness   Extremities: +1 lower extremity edema to the midshin, chronic venous stasis changes   Pulses:   Pulses palpable and equal bilaterally. Normal radial, carotid, femoral, dorsalis pedis and posterior tibial pulses bilaterally. Normal abdominal aorta   Skin:  Psychiatric:   No bleeding, bruising or rash    Alert and oriented x 3, normal mood and affect   Lab Review:     Results from last 7 days   Lab Units 12/29/23  0536 "   SODIUM mmol/L 139   POTASSIUM mmol/L 4.3   CHLORIDE mmol/L 105   CO2 mmol/L 25.1   BUN mg/dL 32*   CREATININE mg/dL 1.50*   CALCIUM mg/dL 8.9   GLUCOSE mg/dL 98     Results from last 7 days   Lab Units 12/29/23  0728 12/29/23  0536   HSTROP T ng/L 508* 473*     Results from last 7 days   Lab Units 12/29/23  0536   WBC 10*3/mm3 7.38   HEMOGLOBIN g/dL 12.5*   HEMATOCRIT % 36.4*   PLATELETS 10*3/mm3 105*     Results from last 7 days   Lab Units 12/29/23  0912   INR  3.60*     Results from last 7 days   Lab Units 12/29/23  0536   MAGNESIUM mg/dL 2.2                   I personally viewed and interpreted the patient's EKG/Telemetry data.        Assessment and Plan:       1.  Non-STEMI, etiology unknown, likely type I.  Patient with chest pain over the last week, last episode of chest pain was yesterday.  Most severe chest pain was a week ago.  [facility] continues to decline today to 473.  His baseline EKG is unknown however here he has a left bundle branch block, unclear whether this is new or not.  His INR is 3.6, prohibitive for catheterization.    - Will allow INR to drift naturally less than 2 given the absence of active chest pain.  Will plan cardiac catheterization after that.  In the meantime he will need to have a chest CT to understand whether he truly has a lung mass and possibly thoracentesis, which may alter plans for PCI.   -  Will plan echocardiogram today.  - Aspirin, beta-blockade, hold off on anticoagulation given supratherapeutic INR.  2.  Unilateral pleural effusion with apparent new lung mass: Reportedly from outside hospital this was noted.  Pulmonary plans to repeat CT.  3.  History of CAD  4.  History of chronic anticoagulation now supratherapeutic INR.  No clear indication, will need records from the PCP.  5.  PPIER: Creatinine 1.6.  Unknown baseline    Leana Gaston MD  12/29/23  09:59 EST

## 2023-12-29 NOTE — CASE MANAGEMENT/SOCIAL WORK
Discharge Planning Assessment  [facility]     Patient Name: Derian Hendricks  MRN: [id number]  Today's Date: 12/29/2023    Admit Date: 12/29/2023    Plan: pending therapy   Discharge Needs Assessment       Row Name 12/29/23 1441       Living Environment    People in Home alone    Current Living Arrangements home    Potentially Unsafe Housing Conditions none    Primary Care Provided by self    Provides Primary Care For no one    Family Caregiver if Needed child(phuong), adult    Quality of Family Relationships helpful;involved;supportive    Able to Return to Prior Arrangements yes       Resource/Environmental Concerns    Resource/Environmental Concerns none    Transportation Concerns none       Transition Planning    Patient/Family Anticipates Transition to home;home with family    Patient/Family Anticipated Services at Transition none    Transportation Anticipated family or friend will provide       Discharge Needs Assessment    Readmission Within the Last 30 Days no previous admission in last 30 days    Equipment Currently Used at Home cane, straight    Anticipated Changes Related to Illness none    Equipment Needed After Discharge none    Provided Post Acute Provider List? N/A    Provided Post Acute Provider Quality & Resource List? N/A                   Discharge Plan       Row Name 12/29/23 1441       Plan    Plan pending therapy    Patient/Family in Agreement with Plan yes    Plan Comments CCP met with patient at bedside. Introduced self and explained role of CCP. Patient confirmed the information on his face sheet is accurate. Patient uses [facility] pharmacy. Patients PCP is Lan De Dios. Patient lives at home alone. Patient is independent at home at baseline. Patient has used [facility] in the past. Patient has never been to a SNF. Patients use a cane sometimes. PT pending. CCP to follow. Family can transport.                  Continued Care and Services - Admitted Since 12/29/2023     Coordination has not been started for this encounter.       Expected Discharge Date and Time       Expected Discharge Date Expected Discharge Time    Jan 1, 2024            Demographic Summary       Row Name 12/29/23 1441       General Information    Admission Type inpatient    Arrived From emergency department    Referral Source admission list    Reason for Consult discharge planning    Preferred Language English                   Functional Status       Row Name 12/29/23 1441       Functional Status    Usual Activity Tolerance moderate    Current Activity Tolerance moderate       Functional Status, IADL    Medications independent    Meal Preparation independent    Housekeeping independent    Laundry independent    Shopping independent       Mental Status    General Appearance WDL WDL       Mental Status Summary    Recent Changes in Mental Status/Cognitive Functioning no changes       Employment/    Employment Status retired                   Psychosocial    No documentation.                  Abuse/Neglect    No documentation.                  Legal    No documentation.                  Substance Abuse    No documentation.                  Patient Forms    No documentation.

## 2023-12-30 LAB
ANION GAP SERPL CALCULATED.3IONS-SCNC: 9 MMOL/L (ref 5–15)
BASOPHILS # BLD AUTO: 0.03 10*3/MM3 (ref 0–0.2)
BASOPHILS NFR BLD AUTO: 0.5 % (ref 0–1.5)
BUN SERPL-MCNC: 41 MG/DL (ref 8–23)
BUN/CREAT SERPL: 20.6 (ref 7–25)
CALCIUM SPEC-SCNC: 8.7 MG/DL (ref 8.6–10.5)
CHLORIDE SERPL-SCNC: 106 MMOL/L (ref 98–107)
CO2 SERPL-SCNC: 25 MMOL/L (ref 22–29)
CREAT SERPL-MCNC: 1.99 MG/DL (ref 0.76–1.27)
DEPRECATED RDW RBC AUTO: 54.2 FL (ref 37–54)
EGFRCR SERPLBLD CKD-EPI 2021: 31.5 ML/MIN/1.73
EOSINOPHIL # BLD AUTO: 0.36 10*3/MM3 (ref 0–0.4)
EOSINOPHIL NFR BLD AUTO: 5.6 % (ref 0.3–6.2)
ERYTHROCYTE [DISTWIDTH] IN BLOOD BY AUTOMATED COUNT: 14.7 % (ref 12.3–15.4)
FOLATE SERPL-MCNC: 12.5 NG/ML (ref 4.78–24.2)
GLUCOSE SERPL-MCNC: 119 MG/DL (ref 65–99)
HCT VFR BLD AUTO: 33.5 % (ref 37.5–51)
HGB BLD-MCNC: 11.2 G/DL (ref 13–17.7)
INR PPP: 2.8 (ref 0.9–1.1)
LYMPHOCYTES # BLD AUTO: 1.84 10*3/MM3 (ref 0.7–3.1)
LYMPHOCYTES NFR BLD AUTO: 28.5 % (ref 19.6–45.3)
MCH RBC QN AUTO: 33.3 PG (ref 26.6–33)
MCHC RBC AUTO-ENTMCNC: 33.4 G/DL (ref 31.5–35.7)
MCV RBC AUTO: 99.7 FL (ref 79–97)
MONOCYTES # BLD AUTO: 0.45 10*3/MM3 (ref 0.1–0.9)
MONOCYTES NFR BLD AUTO: 7 % (ref 5–12)
NEUTROPHILS NFR BLD AUTO: 3.76 10*3/MM3 (ref 1.7–7)
NEUTROPHILS NFR BLD AUTO: 58.1 % (ref 42.7–76)
PLATELET # BLD AUTO: 96 10*3/MM3 (ref 140–450)
PMV BLD AUTO: 11.7 FL (ref 6–12)
POTASSIUM SERPL-SCNC: 4.8 MMOL/L (ref 3.5–5.2)
PROTHROMBIN TIME: 30.1 SECONDS (ref 11.7–14.2)
RBC # BLD AUTO: 3.36 10*6/MM3 (ref 4.14–5.8)
SODIUM SERPL-SCNC: 140 MMOL/L (ref 136–145)
VIT B12 BLD-MCNC: 307 PG/ML (ref 211–946)
WBC NRBC COR # BLD AUTO: 6.46 10*3/MM3 (ref 3.4–10.8)

## 2023-12-30 PROCEDURE — 82746 ASSAY OF FOLIC ACID SERUM: CPT | Performed by: STUDENT IN AN ORGANIZED HEALTH CARE EDUCATION/TRAINING PROGRAM

## 2023-12-30 PROCEDURE — 99232 SBSQ HOSP IP/OBS MODERATE 35: CPT | Performed by: INTERNAL MEDICINE

## 2023-12-30 PROCEDURE — 80048 BASIC METABOLIC PNL TOTAL CA: CPT | Performed by: INTERNAL MEDICINE

## 2023-12-30 PROCEDURE — 85610 PROTHROMBIN TIME: CPT | Performed by: INTERNAL MEDICINE

## 2023-12-30 PROCEDURE — 82607 VITAMIN B-12: CPT | Performed by: STUDENT IN AN ORGANIZED HEALTH CARE EDUCATION/TRAINING PROGRAM

## 2023-12-30 PROCEDURE — 85025 COMPLETE CBC W/AUTO DIFF WBC: CPT | Performed by: INTERNAL MEDICINE

## 2023-12-30 RX ADMIN — Medication 10 ML: at 21:12

## 2023-12-30 RX ADMIN — ALLOPURINOL 100 MG: 100 TABLET ORAL at 08:02

## 2023-12-30 RX ADMIN — Medication 10 ML: at 08:41

## 2023-12-30 RX ADMIN — ASPIRIN 81 MG: 81 TABLET, COATED ORAL at 08:02

## 2023-12-30 RX ADMIN — CARVEDILOL 12.5 MG: 12.5 TABLET, FILM COATED ORAL at 16:58

## 2023-12-30 RX ADMIN — CARVEDILOL 12.5 MG: 12.5 TABLET, FILM COATED ORAL at 08:02

## 2023-12-30 NOTE — PROGRESS NOTES
"                                              LOS: 1 day   Patient Care Team:  Lan De Dios Jr. as PCP - General (Family Medicine)    Chief Complaint:  F/up lung mass and pleural effusion    Subjective   Interval History  I reviewed the admission note, progress notes, PMH, PSH, Family hx, social history, imagings and prior records on this admission, summarized the finding in my note and formulated a transition of care plan.      On 2 L oxygen.  He does not use oxygen at home.  He denies dyspnea or cough.    REVIEW OF SYSTEMS:   CARDIOVASCULAR: No chest pain, chest pressure or chest discomfort. No palpitations or edema.   GASTROINTESTINAL: No anorexia, nausea, vomiting or diarrhea. No abdominal pain.  CONSTITUTIONAL: No fever or chills.     Ventilator/Non-Invasive Ventilation Settings (From admission, onward)      None                  Physical Exam:     Vital Signs  Temp:  [97.6 °F (36.4 °C)-98.6 °F (37 °C)] 97.6 °F (36.4 °C)  Heart Rate:  [55-70] 56  Resp:  [14-18] 18  BP: ()/(41-84) 116/80    Intake/Output Summary (Last 24 hours) at 12/30/2023 1300  Last data filed at 12/30/2023 1154  Gross per 24 hour   Intake 800 ml   Output 175 ml   Net 625 ml     Flowsheet Rows      Flowsheet Row First Filed Value   Admission Height 175.3 cm (69\") Documented at 12/29/2023 0314   Admission Weight 98.8 kg (217 lb 14.4 oz) Documented at 12/29/2023 0314            PPE used per hospital policy    General Appearance:   Alert, cooperative, in no acute distress   ENMT:  Mallampati score 3, moist mucous membrane   Eyes:  Pupils equal and reactive to light. EOMI   Neck:   Trachea midline. No thyromegaly.   Lungs:   Equal but diminished air entry throughout.  Bibasilar crackles.    Heart:   Regular rhythm and normal rate, normal S1 and S2, no         murmur   Skin:   No rash or ecchymosis   Abdomen:    Obese. Soft. No tenderness. No HSM.   Neuro/psych:  Conscious, alert, oriented x3. Strength 5/5 in upper and lower  ext.  " Appropriate mood and affect   Extremities:  No cyanosis, clubbing or edema.  Warm extremities and well-perfused          Results Review:        Results from last 7 days   Lab Units 12/29/23  0536   SODIUM mmol/L 139   POTASSIUM mmol/L 4.3   CHLORIDE mmol/L 105   CO2 mmol/L 25.1   BUN mg/dL 32*   CREATININE mg/dL 1.50*   GLUCOSE mg/dL 98   CALCIUM mg/dL 8.9     Results from last 7 days   Lab Units 12/29/23  0728 12/29/23  0536   HSTROP T ng/L 508* 473*     Results from last 7 days   Lab Units 12/29/23  0536   WBC 10*3/mm3 7.38   HEMOGLOBIN g/dL 12.5*   HEMATOCRIT % 36.4*   PLATELETS 10*3/mm3 105*     Results from last 7 days   Lab Units 12/29/23  0912   INR  3.60*                               I reviewed the patient's new clinical results.        Medication Review:   allopurinol, 100 mg, Oral, Daily  aspirin, 81 mg, Oral, Daily  carvedilol, 12.5 mg, Oral, BID With Meals  senna-docusate sodium, 2 tablet, Oral, BID  sodium chloride, 10 mL, Intravenous, Q12H            Assessment     Right pleural effusion  Left lung mass  Non-STEMI type I  Anticoagulation with Coumadin.  No clear indication  History of CAD  PIPER      All problems new to me    Plan       Noted plan by cardiology for heart catheterization when patient's INR allows  Check CT chest W/O contrast to evaluate for pleural effusion/lung mass  Check daily INR.  Discussed thoracentesis with family and patient and they were agreeable but to wait for imaging to determine whether the effusion is large enough to drain.  Oxygen by NC and titrate to keep respiratory >90%        Julián Espinoza MD  12/30/23  13:00 EST          This note was dictated utilizing Dragon dictation

## 2023-12-30 NOTE — PROGRESS NOTES
Name: Derian Hendricks ADMIT: 2023   : 1934  PCP: Lan De Dios Jr.    MRN: [id number] LOS: 1 days   AGE/SEX: 89 y.o. male  ROOM: [facility]/     Subjective   Subjective   No acute events overnight.  Patient states that he is feeling pretty well today.  Shortness of breath is at baseline.  No chest pain.  Nasal cannula in place.  Multiple myeloma present bedside.    Objective   Objective     Vital Signs  Temp:  [97.6 °F (36.4 °C)-98.6 °F (37 °C)] 97.6 °F (36.4 °C)  Heart Rate:  [55-70] 56  Resp:  [14-18] 18  BP: ()/(41-84) 116/80  SpO2:  [89 %-98 %] 98 %  on  Flow (L/min):  [2] 2;   Device (Oxygen Therapy): nasal cannula  Body mass index is 32.18 kg/m².    Physical Exam  General: Alert, no acute distress.  Lying in bed.  Comfortable appearing.  Nasal cannula in place.  ENT: No conjunctival injection or scleral icterus. Moist mucous membranes.   Neuro: Eyes open and moving in all directions, strength normal in all extremities, no focal deficits.   Lungs: Diminished breath sounds bilaterally.  No focal crackles or wheezes noted.  Overall comfortable appearing.  Heart: RRR, no murmurs.   Abdomen: Soft, non-tender, non-distended. Normal bowel sounds.   Ext: 1+ bilateral lower extremity edema, chronic venous stasis changes.  Skin: No rashes or lesions. IV site without swelling or erythema.     Results Review     I reviewed the patient's new clinical results:  Results from last 7 days   Lab Units 23  0536   WBC 10*3/mm3 7.38   HEMOGLOBIN g/dL 12.5*   PLATELETS 10*3/mm3 105*     Results from last 7 days   Lab Units 23  0536   SODIUM mmol/L 139   POTASSIUM mmol/L 4.3   CHLORIDE mmol/L 105   CO2 mmol/L 25.1   BUN mg/dL 32*   CREATININE mg/dL 1.50*   GLUCOSE mg/dL 98   EGFR mL/min/1.73 44.2*       Results from last 7 days   Lab Units 23  0536   CALCIUM mg/dL 8.9   MAGNESIUM mg/dL 2.2     Results from last 7 days   Lab Units 23  0728   PROCALCITONIN ng/mL 0.15     No results  "found for: \"HGBA1C\", \"POCGLU\"    No radiology results for the last day    I have personally reviewed all medications:  Scheduled Medications  allopurinol, 100 mg, Oral, Daily  aspirin, 81 mg, Oral, Daily  carvedilol, 12.5 mg, Oral, BID With Meals  senna-docusate sodium, 2 tablet, Oral, BID  sodium chloride, 10 mL, Intravenous, Q12H    Infusions   Diet  Diet: Regular/House Diet; Texture: Regular Texture (IDDSI 7); Fluid Consistency: Thin (IDDSI 0)    Assessment/Plan     Active Hospital Problems    Diagnosis  POA    **Acute respiratory failure with hypoxia [J96.01]  Unknown    Chest pain [R07.9]  Yes    Pleural effusion [J90]  Yes    CKD (chronic kidney disease) [N18.9]  Yes    HTN (hypertension) [I10]  Unknown    Shortness of breath [R06.02]  Yes    Lung mass [R91.8]  Unknown    Elevated troponin [R79.89]  Unknown    Atrial fibrillation [I48.91]  Unknown    Anemia [D64.9]  Unknown    Thrombocytopenia [D69.6]  Unknown      Resolved Hospital Problems   No resolved problems to display.       89 y.o. male with Acute respiratory failure with hypoxia.    Acute hypoxic respiratory failure  Shortness of breath  Pleural effusion  New lung mass  -Imaging from outside hospital reportedly with new left lung mass and large right-sided pleural effusion  -Currently on 2 L nasal cannula, oxygen supplementation as needed to maintain saturations above 90%  -WBC normal, patient afebrile so hold off on antibiotics for now  -MRSA, Legionella, strep pneumo negative  -Pulmonology consulted once following  -Repeat CT chest pending, pulmonology to determine the need for thoracentesis based on imaging results     Elevated troponin  Chest pain  -EKG here with possible LBBB, no priors for comparison  -Troponin elevated at 473, increased to 508 on recheck  -Cardiology consulted, appreciate their assistance  -Planning for [facility] when INR normalizes, currently 3.6     CKD  -Creatinine 1.50 on arrival, unknown baseline  -Avoid nephrotoxic agents " including contrast and NSAIDs  -Closely monitor renal function with daily BMP  -Labs pending today, will follow     HTN  Atrial fibrillation  CHF  -BP acceptable acutely  -Continue atenolol  -Hold warfarin, INR 3.6  -Hold Lasix and spironolactone for now  -Cardiology consulted as above, appreciate their assistance  -Echocardiogram with EF 31-35%, numerous hypokinetic segments, grade 1 diastolic dysfunction; regional wall motion abnormalities suggestive of stress-induced cardiomyopathy versus multivessel coronary artery disease     Anemia  Thrombocytopenia  -Hgb 12.5, platelets 105 on arrival  -Unknown what patient's baseline is, prior labs in our system  -No signs of active bleeding  -Monitor with daily CBC, transfuse for Hgb less than 7  -Labs pending today, will follow-up     SCDs for DVT prophylaxis.  Full code.  Discussed with patient, family, and nursing staff.  Anticipate discharge TBJANELL Ordaz MD  [facility]  12/30/23  14:53 EST

## 2023-12-30 NOTE — PROGRESS NOTES
"Patient Care Team:  Lan De Dios Jr. as PCP - General (Family Medicine)    Chief Complaint: Non-ST elevation MI.    Interval History:   No chest pain currently.    Objective   Vital Signs  Temp:  [97.6 °F (36.4 °C)-98.6 °F (37 °C)] 97.6 °F (36.4 °C)  Heart Rate:  [55-72] 59  Resp:  [14-18] 18  BP: ()/(41-84) 104/53    Intake/Output Summary (Last 24 hours) at 12/30/2023 1156  Last data filed at 12/30/2023 0800  Gross per 24 hour   Intake 840 ml   Output 175 ml   Net 665 ml     Flowsheet Rows      Flowsheet Row First Filed Value   Admission Height 175.3 cm (69\") Documented at 12/29/2023 0314   Admission Weight 98.8 kg (217 lb 14.4 oz) Documented at 12/29/2023 0314            Temp:  [97.6 °F (36.4 °C)-98.6 °F (37 °C)] 97.6 °F (36.4 °C)  Heart Rate:  [55-72] 59  Resp:  [14-18] 18  BP: ()/(41-84) 104/53    Intake/Output Summary (Last 24 hours) at 12/30/2023 1156  Last data filed at 12/30/2023 0800  Gross per 24 hour   Intake 840 ml   Output 175 ml   Net 665 ml     Flowsheet Rows      Flowsheet Row First Filed Value   Admission Height 175.3 cm (69\") Documented at 12/29/2023 0314   Admission Weight 98.8 kg (217 lb 14.4 oz) Documented at 12/29/2023 0314            General Appearance:    Alert, cooperative, in no acute distress   Head:    Normocephalic, without obvious abnormality, atraumatic       Neck/Lymph   No adenopathy, supple, no thyromegaly, no carotid bruit, no    JVD   Lungs:     Clear to auscultation bilaterally, no wheezes, rales, or     rhonchi    Cardiac:    Normal rate, regular rhythm, no murmur, no rub, no gallop   Chest Wall:    No abnormalities observed   GI:     Normal bowel sounds, soft, nontender, nondistended,            no rebound tenderness   Extremities:   No cyanosis, clubbing, or edema   Circulatory/Peripheral Vascular :   Pulses palpable and equal bilaterally   Integumentary:   No bleeding or rash. Normal temperature       Neurologic:   Cranial nerves 2 - 12 grossly intact, " sensation intact              allopurinol, 100 mg, Oral, Daily  aspirin, 81 mg, Oral, Daily  carvedilol, 12.5 mg, Oral, BID With Meals  senna-docusate sodium, 2 tablet, Oral, BID  sodium chloride, 10 mL, Intravenous, Q12H             Results Review:    Results from last 7 days   Lab Units 12/29/23  0536   SODIUM mmol/L 139   POTASSIUM mmol/L 4.3   CHLORIDE mmol/L 105   CO2 mmol/L 25.1   BUN mg/dL 32*   CREATININE mg/dL 1.50*   GLUCOSE mg/dL 98   CALCIUM mg/dL 8.9     Results from last 7 days   Lab Units 12/29/23  0728 12/29/23  0536   HSTROP T ng/L 508* 473*     Results from last 7 days   Lab Units 12/29/23  0536   WBC 10*3/mm3 7.38   HEMOGLOBIN g/dL 12.5*   HEMATOCRIT % 36.4*   PLATELETS 10*3/mm3 105*     Results from last 7 days   Lab Units 12/29/23  0912   INR  3.60*         Results from last 7 days   Lab Units 12/29/23  0536   MAGNESIUM mg/dL 2.2         @LABRCNT(bnp)@  I reviewed the patient's new clinical results.  I personally viewed and interpreted the patient's EKG/Telemetry data            Assessment & Plan   1.  Non-STEMI, etiology unknown, likely type I.  No chest pain currently.  Most severe chest pain was a week ago.  Peak troponin at the [facility] hospital was 823.  Stable today 508.  His INR is 3.6 yesterday.  2.  Unilateral pleural effusion with apparent new lung mass: Reportedly from outside hospital this was noted.  3.  History of CAD  4.  History of chronic anticoagulation now supratherapeutic INR.  No clear indication, will need records from the PCP.  5.  PIPER: Creatinine 1.5    -Repeat labs have been ordered.  - No chest pain currently.  - Plan on catheterization when INR decreases

## 2023-12-30 NOTE — PLAN OF CARE
Goal Outcome Evaluation:  Plan of Care Reviewed With: patient, family           Outcome Evaluation: VSS, AOx4. Ambulating with standby assistance to bathroom with no issues at this time. No new issues or pain reported during shift. Decent appetite and fluid intake. Currently on 2L O2. WCTM.

## 2023-12-30 NOTE — PLAN OF CARE
Problem: Adult Inpatient Plan of Care  Goal: Plan of Care Review  Outcome: Ongoing, Progressing  Flowsheets (Taken 12/30/2023 0558)  Plan of Care Reviewed With: patient  Goal: Patient-Specific Goal (Individualized)  Outcome: Ongoing, Progressing  Goal: Absence of Hospital-Acquired Illness or Injury  Outcome: Ongoing, Progressing  Intervention: Identify and Manage Fall Risk  Description: Perform standard risk assessment on admission using a validated tool or comprehensive approach appropriate to the patient; reassess fall risk frequently, with change in status or transfer to another level of care.  Communicate fall injury risk to interprofessional healthcare team.  Determine need for increased observation, equipment and environmental modification, such as low bed, signage and supportive, nonskid footwear.  Adjust safety measures to individual developmental age, stage and identified risk factors.  Reinforce the importance of safety and physical activity with patient and family.  Perform regular intentional rounding to assess need for position change, pain assessment and personal needs, including assistance with toileting.  Recent Flowsheet Documentation  Taken 12/30/2023 0459 by Wesly Blackmon, RN  Safety Promotion/Fall Prevention:   assistive device/personal items within reach   activity supervised   fall prevention program maintained   clutter free environment maintained   nonskid shoes/slippers when out of bed   muscle strengthening facilitated   room organization consistent   safety round/check completed  Taken 12/30/2023 0221 by Wesly Blackmon, RN  Safety Promotion/Fall Prevention:   assistive device/personal items within reach   activity supervised   fall prevention program maintained   clutter free environment maintained   muscle strengthening facilitated   nonskid shoes/slippers when out of bed   safety round/check completed   room organization consistent  Taken 12/30/2023 0036 by Wesly Blackmon, RN  Safety  Promotion/Fall Prevention:   assistive device/personal items within reach   activity supervised   fall prevention program maintained   clutter free environment maintained   muscle strengthening facilitated   nonskid shoes/slippers when out of bed   safety round/check completed   room organization consistent  Taken 12/29/2023 2256 by Wesly Blackmon RN  Safety Promotion/Fall Prevention:   activity supervised   fall prevention program maintained   clutter free environment maintained   assistive device/personal items within reach   nonskid shoes/slippers when out of bed   muscle strengthening facilitated   safety round/check completed   room organization consistent  Taken 12/29/2023 2010 by Wesly Blackmon RN  Safety Promotion/Fall Prevention:   activity supervised   assistive device/personal items within reach   lighting adjusted   mobility aid in reach   room organization consistent   safety round/check completed   fall prevention program maintained   clutter free environment maintained  Intervention: Prevent Skin Injury  Description: Perform a screening for skin injury risk, such as pressure or moisture associated skin damage on admission and at regular intervals throughout hospital stay.  Keep all areas of skin (especially folds) clean and dry.  Maintain adequate skin hydration.  Relieve and redistribute pressure and protect bony prominences; implement measures based on patient-specific risk factors.  Match turning and repositioning schedule to clinical condition.  Encourage weight shift frequently; assist with reposition if unable to complete independently.  Float heels off bed; avoid pressure on the Achilles tendon.  Keep skin free from extended contact with medical devices.  Encourage functional activity and mobility, as early as tolerated.  Use aids (e.g., slide boards, mechanical lift) during transfer.  Recent Flowsheet Documentation  Taken 12/30/2023 9698 by Wesly Blackmon, RN  Body Position: position changed  independently  Taken 12/30/2023 0221 by Wesyl Blackmon RN  Body Position: position changed independently  Taken 12/30/2023 0036 by Wesly Blackmon RN  Body Position: position changed independently  Taken 12/29/2023 2256 by Wesly Blackmon RN  Body Position: position changed independently  Taken 12/29/2023 2010 by Wesly Blackmon RN  Body Position: position changed independently  Intervention: Prevent and Manage VTE (Venous Thromboembolism) Risk  Description: Assess for VTE (venous thromboembolism) risk.  Encourage and assist with early ambulation.  Initiate and maintain compression or other therapy, as indicated, based on identified risk in accordance with organizational protocol and provider order.  Encourage both active and passive leg exercises while in bed, if unable to ambulate.  Recent Flowsheet Documentation  Taken 12/30/2023 0459 by Wesly Blackmon RN  Activity Management: activity encouraged  VTE Prevention/Management:   bilateral   compression stockings off  Taken 12/30/2023 0221 by Wesly Blackmon RN  Activity Management: activity encouraged  Taken 12/30/2023 0036 by Wesly Blackmon RN  Activity Management: activity encouraged  VTE Prevention/Management:   bilateral   compression stockings off  Taken 12/29/2023 2256 by Wesly Blackmon RN  Activity Management: activity encouraged  Taken 12/29/2023 2010 by Wesly Blackmon RN  Activity Management: activity encouraged  VTE Prevention/Management:   bilateral   compression stockings off  Intervention: Prevent Infection  Description: Maintain skin and mucous membrane integrity; promote hand, oral and pulmonary hygiene.  Optimize fluid balance, nutrition, sleep and glycemic control to maximize infection resistance.  Identify potential sources of infection early to prevent or mitigate progression of infection (e.g., wound, lines, devices).  Evaluate ongoing need for invasive devices; remove promptly when no longer indicated.  Recent Flowsheet Documentation  Taken 12/30/2023 0454  by Neymar, Wesly, RN  Infection Prevention:   visitors restricted/screened   single patient room provided   rest/sleep promoted   personal protective equipment utilized   environmental surveillance performed   cohorting utilized   hand hygiene promoted   equipment surfaces disinfected  Taken 12/30/2023 0221 by Wesly Blackmon RN  Infection Prevention:   visitors restricted/screened   single patient room provided   rest/sleep promoted   personal protective equipment utilized   hand hygiene promoted   equipment surfaces disinfected   cohorting utilized   environmental surveillance performed  Taken 12/30/2023 0036 by Wesly Blackmon RN  Infection Prevention:   visitors restricted/screened   single patient room provided   rest/sleep promoted   hand hygiene promoted   equipment surfaces disinfected   cohorting utilized   environmental surveillance performed   personal protective equipment utilized  Taken 12/29/2023 2256 by Wesly Blackmon RN  Infection Prevention:   visitors restricted/screened   single patient room provided   rest/sleep promoted   personal protective equipment utilized   hand hygiene promoted   equipment surfaces disinfected   environmental surveillance performed   cohorting utilized  Taken 12/29/2023 2010 by Wesly Blackmon RN  Infection Prevention:   visitors restricted/screened   single patient room provided   rest/sleep promoted   personal protective equipment utilized   hand hygiene promoted   environmental surveillance performed   cohorting utilized   equipment surfaces disinfected  Goal: Optimal Comfort and Wellbeing  Outcome: Ongoing, Progressing  Intervention: Provide Person-Centered Care  Description: Use a family-focused approach to care.  Develop trust and rapport by proactively providing information, encouraging questions, addressing concerns and offering reassurance.  Acknowledge emotional response to hospitalization.  Recognize and utilize personal coping strategies.  Honor spiritual and cultural  preferences.  Recent Flowsheet Documentation  Taken 12/30/2023 0459 by Wesly Blackomn RN  Trust Relationship/Rapport:   thoughts/feelings acknowledged   reassurance provided   questions encouraged   questions answered   empathic listening provided   emotional support provided   choices provided   care explained  Taken 12/30/2023 0036 by Wesly Blackmon RN  Trust Relationship/Rapport:   thoughts/feelings acknowledged   reassurance provided   questions encouraged   questions answered   emotional support provided   choices provided   care explained   empathic listening provided  Taken 12/29/2023 2010 by Wesly Blackmon RN  Trust Relationship/Rapport:   thoughts/feelings acknowledged   reassurance provided   questions encouraged   questions answered   empathic listening provided   emotional support provided   choices provided   care explained  Goal: Readiness for Transition of Care  Outcome: Ongoing, Progressing   Goal Outcome Evaluation:  Plan of Care Reviewed With: patient

## 2023-12-31 ENCOUNTER — APPOINTMENT (OUTPATIENT)
Dept: GENERAL RADIOLOGY | Facility: HOSPITAL | Age: 88
End: 2023-12-31
Payer: MEDICARE

## 2023-12-31 LAB
ANION GAP SERPL CALCULATED.3IONS-SCNC: 9.3 MMOL/L (ref 5–15)
BUN SERPL-MCNC: 46 MG/DL (ref 8–23)
BUN/CREAT SERPL: 23.2 (ref 7–25)
CALCIUM SPEC-SCNC: 8.3 MG/DL (ref 8.6–10.5)
CHLORIDE SERPL-SCNC: 105 MMOL/L (ref 98–107)
CO2 SERPL-SCNC: 23.7 MMOL/L (ref 22–29)
CREAT SERPL-MCNC: 1.98 MG/DL (ref 0.76–1.27)
DEPRECATED RDW RBC AUTO: 56.9 FL (ref 37–54)
EGFRCR SERPLBLD CKD-EPI 2021: 31.7 ML/MIN/1.73
ERYTHROCYTE [DISTWIDTH] IN BLOOD BY AUTOMATED COUNT: 15 % (ref 12.3–15.4)
GLUCOSE SERPL-MCNC: 92 MG/DL (ref 65–99)
HCT VFR BLD AUTO: 31.3 % (ref 37.5–51)
HGB BLD-MCNC: 10.1 G/DL (ref 13–17.7)
INR PPP: 2.4 (ref 0.9–1.1)
MCH RBC QN AUTO: 33 PG (ref 26.6–33)
MCHC RBC AUTO-ENTMCNC: 32.3 G/DL (ref 31.5–35.7)
MCV RBC AUTO: 102.3 FL (ref 79–97)
PLATELET # BLD AUTO: 97 10*3/MM3 (ref 140–450)
PMV BLD AUTO: 11.3 FL (ref 6–12)
POTASSIUM SERPL-SCNC: 4.3 MMOL/L (ref 3.5–5.2)
PROTHROMBIN TIME: 26.7 SECONDS (ref 11.7–14.2)
RBC # BLD AUTO: 3.06 10*6/MM3 (ref 4.14–5.8)
SODIUM SERPL-SCNC: 138 MMOL/L (ref 136–145)
WBC NRBC COR # BLD AUTO: 5.9 10*3/MM3 (ref 3.4–10.8)

## 2023-12-31 PROCEDURE — 85610 PROTHROMBIN TIME: CPT | Performed by: NURSE PRACTITIONER

## 2023-12-31 PROCEDURE — 25810000003 SODIUM CHLORIDE 0.9 % SOLUTION: Performed by: STUDENT IN AN ORGANIZED HEALTH CARE EDUCATION/TRAINING PROGRAM

## 2023-12-31 PROCEDURE — 97530 THERAPEUTIC ACTIVITIES: CPT

## 2023-12-31 PROCEDURE — 80048 BASIC METABOLIC PNL TOTAL CA: CPT | Performed by: STUDENT IN AN ORGANIZED HEALTH CARE EDUCATION/TRAINING PROGRAM

## 2023-12-31 PROCEDURE — 99232 SBSQ HOSP IP/OBS MODERATE 35: CPT | Performed by: NURSE PRACTITIONER

## 2023-12-31 PROCEDURE — 71046 X-RAY EXAM CHEST 2 VIEWS: CPT

## 2023-12-31 PROCEDURE — 85027 COMPLETE CBC AUTOMATED: CPT | Performed by: STUDENT IN AN ORGANIZED HEALTH CARE EDUCATION/TRAINING PROGRAM

## 2023-12-31 RX ORDER — SODIUM CHLORIDE 9 MG/ML
50 INJECTION, SOLUTION INTRAVENOUS CONTINUOUS
Status: DISCONTINUED | OUTPATIENT
Start: 2023-12-31 | End: 2024-01-02

## 2023-12-31 RX ADMIN — Medication 10 ML: at 19:43

## 2023-12-31 RX ADMIN — ASPIRIN 81 MG: 81 TABLET, COATED ORAL at 08:38

## 2023-12-31 RX ADMIN — Medication 10 ML: at 08:38

## 2023-12-31 RX ADMIN — CARVEDILOL 12.5 MG: 12.5 TABLET, FILM COATED ORAL at 08:38

## 2023-12-31 RX ADMIN — SODIUM CHLORIDE 50 ML/HR: 9 INJECTION, SOLUTION INTRAVENOUS at 11:02

## 2023-12-31 RX ADMIN — CARVEDILOL 12.5 MG: 12.5 TABLET, FILM COATED ORAL at 16:49

## 2023-12-31 RX ADMIN — ALLOPURINOL 100 MG: 100 TABLET ORAL at 08:38

## 2023-12-31 NOTE — NURSING NOTE
Just received call from Hermila with Ultrasound stating they will not be able to perform the ordered guided ultrasound thoracentesis until this Tuesday 1/2/2023.

## 2023-12-31 NOTE — THERAPY TREATMENT NOTE
Patient Name: Derian Hendricks  : 1934    MRN: [id number]                              Today's Date: 2023       Admit Date: 2023    Visit Dx: No diagnosis found.  Patient Active Problem List   Diagnosis    Chest pain    Pleural effusion    CKD (chronic kidney disease)    HTN (hypertension)    Shortness of breath    Acute respiratory failure with hypoxia    Lung mass    Elevated troponin    Atrial fibrillation    Anemia    Thrombocytopenia     Past Medical History:   Diagnosis Date    Atrial fibrillation     CHF (congestive heart failure)     Coronary artery disease     Diabetes mellitus     GERD (gastroesophageal reflux disease)     Gout     Hypertension      Past Surgical History:   Procedure Laterality Date    CHOLECYSTECTOMY      CORONARY ANGIOPLASTY        General Information       Row Name 23 1031          Physical Therapy Time and Intention    Document Type therapy note (daily note)  -ER     Mode of Treatment individual therapy;physical therapy  -ER       Row Name 23 1031          General Information    Patient Profile Reviewed yes  -ER     Existing Precautions/Restrictions oxygen therapy device and L/min  2L via NC  -ER       Row Name 23 1031          Cognition    Orientation Status (Cognition) oriented x 4  -ER       Row Name 23 1031          Safety Issues, Functional Mobility    Impairments Affecting Function (Mobility) endurance/activity tolerance;shortness of breath;strength  -ER     Comment, Safety Issues/Impairments (Mobility) increased time for sequencing  -ER               User Key  (r) = Recorded By, (t) = Taken By, (c) = Cosigned By      Initials Name Provider Type    ER Chelly Brooks PT Physical Therapist                   Mobility       Row Name 23 1032          Bed Mobility    Comment, (Bed Mobility) sitting EOB at arrival of PT  -ER       Row Name 23 1032          Sit-Stand Transfer    Sit-Stand [place] (Transfers) standby assist  -ER      Assistive Device (Sit-Stand Transfers) other (see comments)  no AD  -ER       Row Name 12/31/23 1032          Gait/Stairs (Locomotion)    [place] Level (Gait) supervision  -ER     Assistive Device (Gait) other (see comments)  no AD  -ER     Patient was able to Ambulate yes  -ER     Distance in Feet (Gait) 70  -ER     Deviations/Abnormal Patterns (Gait) gait speed decreased;stride length decreased  -ER     Bilateral Gait Deviations heel strike decreased  -ER     Comment, (Gait/Stairs) Pt. able to ambulate into hallway with no LOB or unsteadiness noted. Pt. reports he feels he is at his baseline functionally.  -ER               User Key  (r) = Recorded By, (t) = Taken By, (c) = Cosigned By      Initials Name Provider Type    ER Chelly Brooks PT Physical Therapist                   Obj/Interventions       Row Name 12/31/23 1034          Balance    Balance Assessment sitting static balance;standing static balance  -ER     Static Sitting Balance standby assist  -ER     Position, Sitting Balance unsupported;sitting edge of bed  -ER     Static Standing Balance supervision  -ER     Balance Interventions sitting;standing;supported;static;dynamic  -ER               User Key  (r) = Recorded By, (t) = Taken By, (c) = Cosigned By      Initials Name Provider Type    ER Chelly Brooks PT Physical Therapist                   Goals/Plan    No documentation.                  Clinical Impression       Row Name 12/31/23 1034          Pain    Pain Intervention(s) Rest;Repositioned;Ambulation/increased activity  -ER       Row Name 12/31/23 1034          Plan of Care Review    Plan of Care Reviewed With patient;family  -ER     Outcome Evaluation Derian Hendricks is an 89 year old male seen for physical therapy treatment session this morning. Today, pt. seated EOB with family present. Pt. reports that his family assists him to the bathroom, and that he feels that he is at his baseline. He performs STS with standby A, and ambulates 70  ft with no AD, no LOB or unsteadiness - supervision A. Pt. left [facility] with family at end of session. PT recommending home with assist at d/c once medically stable.  -ER       Row Name 12/31/23 1034          Therapy Assessment/Plan (PT)    Rehab Potential (PT) good, to achieve stated therapy goals  -ER     Criteria for Skilled Interventions Met (PT) yes;meets criteria  -ER     Therapy Frequency (PT) 6 times/wk  -ER       Row Name 12/31/23 1034          Positioning and Restraints    Pre-Treatment Position in bed  -ER     Post Treatment Position chair  -ER     In Chair notified nsg;with family/caregiver;call light within reach;encouraged to call for assist  -ER               User Key  (r) = Recorded By, (t) = Taken By, (c) = Cosigned By      Initials Name Provider Type    Chelly Rico PT Physical Therapist                   Outcome Measures       Row Name 12/31/23 1038 12/31/23 0845       How much help from another person do you currently need...    Turning from your back to your side while in flat bed without using bedrails? 4  -ER 4  -AH    Moving from lying on back to sitting on the side of a flat bed without bedrails? 4  -ER 4  -AH    Moving to and from a bed to a chair (including a wheelchair)? 3  -ER 4  -AH    Standing up from a chair using your arms (e.g., wheelchair, bedside chair)? 3  -ER 4  -AH    Climbing 3-5 steps with a railing? 3  -ER 3  -AH    To walk in hospital room? 3  -ER 3  -AH    AM-PAC 6 Clicks Score (PT) 20  -ER 22  -AH    Highest Level of Mobility Goal 6 --> Walk 10 steps or more  -ER 7 --> Walk 25 feet or more  -              User Key  (r) = Recorded By, (t) = Taken By, (c) = Cosigned By      Initials Name Provider Type    Jennifer Flores, RN Registered Nurse    Chelly Rico PT Physical Therapist                                 Physical Therapy Education       Title: PT OT SLP Therapies (Done)       Topic: Physical Therapy (Done)       Point: Mobility training (Done)       Learning  Progress Summary             Patient Acceptance, E, VU by ER at 12/31/2023 1038                         Point: Home exercise program (Done)       Learning Progress Summary             Patient Acceptance, E, VU by ER at 12/31/2023 1038                         Point: Body mechanics (Done)       Learning Progress Summary             Patient Acceptance, E, VU by ER at 12/31/2023 1038                         Point: Precautions (Done)       Learning Progress Summary             Patient Acceptance, E, VU by ER at 12/31/2023 1038    Acceptance, E, VU by MG at 12/29/2023 1527                                         User Key       Initials Effective Dates Name Provider Type Discipline    MG 05/24/22 -  Ignacia Crisostomo, PT Physical Therapist PT    ER 10/15/23 -  Chelly Brooks, PT Physical Therapist PT                  PT Recommendation and Plan     Plan of Care Reviewed With: patient, family  Outcome Evaluation: Derian Hendricks is an 89 year old male seen for physical therapy treatment session this morning. Today, pt. seated EOB with family present. Pt. reports that his family assists him to the bathroom, and that he feels that he is at his baseline. He performs STS with standby A, and ambulates 70 ft with no AD, no LOB or unsteadiness - supervision A. Pt. left [facility] with family at end of session. PT recommending home with assist at d/c once medically stable.     Time Calculation:         PT Charges       Row Name 12/31/23 1039             Time Calculation    Start Time 0914  -ER      Stop Time 0931  -ER      Time Calculation (min) 17 min  -ER      PT Received On 12/31/23  -ER      PT - Next Appointment 01/02/24  -ER         Time Calculation- PT    Total Timed Code Minutes- PT 17 minute(s)  -ER         Timed Charges    41707 - PT Therapeutic Activity Minutes 17  -ER         Total Minutes    Timed Charges Total Minutes 17  -ER       Total Minutes 17  -ER                User Key  (r) = Recorded By, (t) = Taken By, (c) = Cosigned By       Initials Name Provider Type    ER Chelly Brooks, PT Physical Therapist                  Therapy Charges for Today       Code Description Service Date Service Provider Modifiers Qty    72218629633 HC PT THERAPEUTIC ACT EA 15 MIN 12/31/2023 Chelly Brooks, PT GP 1            PT G-Codes  AM-PAC 6 Clicks Score (PT): 20  PT Discharge Summary  Anticipated Discharge Disposition (PT): home with assist    Chelly Brooks PT  12/31/2023

## 2023-12-31 NOTE — PROGRESS NOTES
Name: Derian Hendricks ADMIT: 2023   : 1934  PCP: Lan De Dios Jr.    MRN: [id number] LOS: 2 days   AGE/SEX: 89 y.o. male  ROOM: [facility]/     Subjective   Subjective   No acute events overnight.  Patient sitting up eating breakfast this morning.  States that he is feeling well.  Daughter and son-in-law at bedside.  Unfortunately CT chest was not done yesterday.  He is sixth on the list to have this done today.  He denies any chest pain or shortness of breath.    Objective   Objective     Vital Signs  Temp:  [97.2 °F (36.2 °C)-98.5 °F (36.9 °C)] 97.2 °F (36.2 °C)  Heart Rate:  [53-69] 69  Resp:  [17-18] 17  BP: (105-116)/(54-80) 111/64  SpO2:  [82 %-98 %] 82 %  on  Flow (L/min):  [2] 2;   Device (Oxygen Therapy): nasal cannula  Body mass index is 32.18 kg/m².    Physical Exam  General: Alert, no acute distress.  Sitting up on edge of breath and eating breakfast.  ENT: No conjunctival injection or scleral icterus. Moist mucous membranes.   Neuro: Eyes open and moving in all directions, strength normal in all extremities, no focal deficits.   Lungs: Diminished breath sounds bilaterally.  No focal crackles or wheezes noted.  Overall comfortable appearing.  [place] cannula in place.  Heart: RRR, no murmurs.   Abdomen: Soft, non-tender, non-distended. Normal bowel sounds.   Ext: 1+ bilateral lower extremity edema, chronic venous stasis changes.  Skin: No rashes or lesions. IV site without swelling or erythema.     Results Review     I reviewed the patient's new clinical results:  Results from last 7 days   Lab Units 23  0323 23  1422 23  0536   WBC 10*3/mm3 5.90 6.46 7.38   HEMOGLOBIN g/dL 10.1* 11.2* 12.5*   PLATELETS 10*3/mm3 97* 96* 105*     Results from last 7 days   Lab Units 23  0323 23  1607 23  0536   SODIUM mmol/L 138 140 139   POTASSIUM mmol/L 4.3 4.8 4.3   CHLORIDE mmol/L 105 106 105   CO2 mmol/L 23.7 25.0 25.1   BUN mg/dL 46* 41* 32*   CREATININE mg/dL  "1.98* 1.99* 1.50*   GLUCOSE mg/dL 92 119* 98   EGFR mL/min/1.73 31.7* 31.5* 44.2*       Results from last 7 days   Lab Units 12/31/23  0323 12/30/23  1607 12/29/23  0536   CALCIUM mg/dL 8.3* 8.7 8.9   MAGNESIUM mg/dL  --   --  2.2     Results from last 7 days   Lab Units 12/29/23  0728   PROCALCITONIN ng/mL 0.15     No results found for: \"HGBA1C\", \"POCGLU\"    No radiology results for the last day    I have personally reviewed all medications:  Scheduled Medications  allopurinol, 100 mg, Oral, Daily  aspirin, 81 mg, Oral, Daily  carvedilol, 12.5 mg, Oral, BID With Meals  senna-docusate sodium, 2 tablet, Oral, BID  sodium chloride, 10 mL, Intravenous, Q12H    Infusions   Diet  Diet: Regular/House Diet; Texture: Regular Texture (IDDSI 7); Fluid Consistency: Thin (IDDSI 0)    Assessment/Plan     Active Hospital Problems    Diagnosis  POA    **Acute respiratory failure with hypoxia [J96.01]  Unknown    Chest pain [R07.9]  Yes    Pleural effusion [J90]  Yes    CKD (chronic kidney disease) [N18.9]  Yes    HTN (hypertension) [I10]  Unknown    Shortness of breath [R06.02]  Yes    Lung mass [R91.8]  Unknown    Elevated troponin [R79.89]  Unknown    Atrial fibrillation [I48.91]  Unknown    Anemia [D64.9]  Unknown    Thrombocytopenia [D69.6]  Unknown      Resolved Hospital Problems   No resolved problems to display.       89 y.o. male with Acute respiratory failure with hypoxia.    Acute hypoxic respiratory failure  Shortness of breath  Pleural effusion  New lung mass  -Imaging from outside hospital reportedly with new left lung mass and large right-sided pleural effusion  -Currently on 2 L nasal cannula, oxygen supplementation as needed to maintain saturations above 90%  -WBC normal, patient afebrile so hold off on antibiotics for now  -MRSA, Legionella, strep pneumo negative  -Pulmonology consulted once following  -Repeat CT chest pending, pulmonology to determine the need for thoracentesis based on imaging results " (unfortunately this did not happen yesterday but nursing has talked with CT this morning and he is sixth on the list so hopefully will have imaging completed today)     Elevated troponin  Chest pain  -EKG here with possible LBBB, no priors for comparison  -Troponin elevated at 473, increased to 508 on recheck  -Cardiology consulted, appreciate their assistance  -Planning for [facility] when INR normalizes, pending today     CKD  -Creatinine 1.50 on arrival, unknown baseline  -Creatinine has increased to 1.98 today  -Start some very gentle IV fluids and monitor, EF is 31-36% so going to be very slow with 50 cc/hour   -Avoid nephrotoxic agents including contrast and NSAIDs  -Closely monitor renal function with daily BMP  -Check PVR  -If creatinine continues to worsen tomorrow, may need nephrology consult     HTN  Atrial fibrillation  CHF  -BP acceptable acutely  -Continue atenolol  -Hold warfarin, INR pending today   -Hold Lasix and spironolactone for now  -Cardiology consulted as above, appreciate their assistance  -Echocardiogram with EF 31-35%, numerous hypokinetic segments, grade 1 diastolic dysfunction; regional wall motion abnormalities suggestive of stress-induced cardiomyopathy versus multivessel coronary artery disease     Anemia  Thrombocytopenia  -Hgb 10.1, platelets 97  -Unknown what patient's baseline is, prior labs in our system  -No signs of active bleeding  -Monitor with daily CBC, transfuse for Hgb less than 7    SCDs for DVT prophylaxis.  Full code.  Discussed with patient, family, and nursing staff.  Anticipate discharge TBJANELL Ordaz MD  [facility]  12/31/23  10:19 EST

## 2023-12-31 NOTE — PLAN OF CARE
Goal Outcome Evaluation:  Plan of Care Reviewed With: patient, family           Outcome Evaluation: Derian Hendricks is an 89 year old male seen for physical therapy treatment session this morning. Today, pt. seated EOB with family present. Pt. reports that his family assists him to the bathroom, and that he feels that he is at his baseline. He performs STS with standby A, and ambulates 70 ft with no AD, no LOB or unsteadiness - supervision A. Pt. left [facility] with family at end of session. PT recommending home with assist at d/c once medically stable.      Anticipated Discharge Disposition (PT): home with assist

## 2023-12-31 NOTE — PROGRESS NOTES
"    Patient Name: Derian Hendricks  :1934  89 y.o.      Patient Care Team:  Lan De Dios Jr. as PCP - General (Family Medicine)    Chief Complaint: follow up NSTEMI    Interval History: INR 2.4. Waste products unchanged.        Objective   Vital Signs  Temp:  [97.6 °F (36.4 °C)-98.5 °F (36.9 °C)] 98.4 °F (36.9 °C)  Heart Rate:  [53-60] 53  Resp:  [16-18] 17  BP: (104-116)/(53-80) 105/54    Intake/Output Summary (Last 24 hours) at 2023 0714  Last data filed at 2023 1700  Gross per 24 hour   Intake 560 ml   Output --   Net 560 ml     Flowsheet Rows      Flowsheet Row First Filed Value   Admission Height 175.3 cm (69\") Documented at 2023   Admission Weight 98.8 kg (217 lb 14.4 oz) Documented at 2023            Physical Exam:   General Appearance:    Alert, cooperative, in no acute distress   Lungs:     Clear to auscultation.  Normal respiratory effort and rate.      Heart:    Regular rhythm and normal rate, normal S1 and S2, no murmurs, gallops or rubs.     Chest Wall:    No abnormalities observed   Abdomen:     Soft, nontender, positive bowel sounds.     Extremities:   no cyanosis, clubbing or edema.  No marked joint deformities.  Adequate musculoskeletal strength.       Results Review:    Results from last 7 days   Lab Units 23  0323   SODIUM mmol/L 138   POTASSIUM mmol/L 4.3   CHLORIDE mmol/L 105   CO2 mmol/L 23.7   BUN mg/dL 46*   CREATININE mg/dL 1.98*   GLUCOSE mg/dL 92   CALCIUM mg/dL 8.3*     Results from last 7 days   Lab Units 23  0728 23  0536   HSTROP T ng/L 508* 473*     Results from last 7 days   Lab Units 23  0323   WBC 10*3/mm3 5.90   HEMOGLOBIN g/dL 10.1*   HEMATOCRIT % 31.3*   PLATELETS 10*3/mm3 97*     Results from last 7 days   Lab Units 23  1423 23  0912   INR  2.80* 3.60*     Results from last 7 days   Lab Units 23  0536   MAGNESIUM mg/dL 2.2                   Medication Review:   allopurinol, 100 mg, Oral, " Daily  aspirin, 81 mg, Oral, Daily  carvedilol, 12.5 mg, Oral, BID With Meals  senna-docusate sodium, 2 tablet, Oral, BID  sodium chloride, 10 mL, Intravenous, Q12H              Assessment & Plan   NSTEMI, etiology unknown but type I suspected. No heparin/lovenox given supratherapuetic INR. On ASA and beta blocker.   LBBB, chronicity unknown  Possible lung mass , CT chest ordered to further evaluate. Unilateral pleural effusion , thoracentesis being considered.  CXR pending.  Cardiomyopathy (new?). EF 31-35% with wall motion abnormalities.  Chronic anticoagulation with warfarin. PAF? Indication unclear.   History of CAD  Acute vs chronic kidney disease, baseline unknown. Nephrology consultation prior to contrast? Started on IVF today. Will reassess tomorrow.     INr still to high for cath. No chest pain. I will try to get some labs from [facility] to establish baseline renal function.     Will follow.     VIBHA Cloud  [facility]  12/31/23  07:14 EST

## 2023-12-31 NOTE — PLAN OF CARE
Goal Outcome Evaluation:  Plan of Care Reviewed With: patient, family        Progress: improving  Outcome Evaluation: Vss, AOx4. Ambulating well with staff. Mild right eye itching and dryness reported, saline improved eye issues. No pain reported during shift. Still awaiting CT scan, was next on list at 1735 per CT. Decent appetite and fluid intake during shift. No other issues at this time. TM.

## 2023-12-31 NOTE — PROGRESS NOTES
"                                              LOS: 2 days   Patient Care Team:  Lan De Dios Jr. as PCP - General (Family Medicine)    Chief Complaint:  F/up lung mass ? and pleural effusion    Subjective   Interval History      On 2 L oxygen.  Denies dyspnea at rest or cough.    REVIEW OF SYSTEMS:   CARDIOVASCULAR: No chest pain, chest pressure or chest discomfort. No palpitations or edema.   GASTROINTESTINAL: No anorexia, nausea, vomiting or diarrhea. No abdominal pain.  CONSTITUTIONAL: No fever or chills.     Ventilator/Non-Invasive Ventilation Settings (From admission, onward)      None                  Physical Exam:     Vital Signs  Temp:  [97.2 °F (36.2 °C)-98.6 °F (37 °C)] 98.6 °F (37 °C)  Heart Rate:  [53-69] 69  Resp:  [17] 17  BP: (105-128)/(54-68) 128/65    Intake/Output Summary (Last 24 hours) at 12/31/2023 1413  Last data filed at 12/31/2023 1330  Gross per 24 hour   Intake 720 ml   Output --   Net 720 ml     Flowsheet Rows      Flowsheet Row First Filed Value   Admission Height 175.3 cm (69\") Documented at 12/29/2023 0314   Admission Weight 98.8 kg (217 lb 14.4 oz) Documented at 12/29/2023 0314            PPE used per hospital policy    General Appearance:   Alert, cooperative, in no acute distress   ENMT:  Mallampati score 3, moist mucous membrane   Eyes:  Pupils equal and reactive to light. EOMI   Neck:   Trachea midline. No thyromegaly.   Lungs:   Diminished air entry on the right with mild crackles.  No wheezing..  No use of accessory muscles    Heart:   Regular rhythm and normal rate, normal S1 and S2, no         murmur   Skin:   No rash or ecchymosis   Abdomen:    Obese. Soft. No tenderness. No HSM.   Neuro/psych:  Conscious, alert, oriented x3. Strength 5/5 in upper and lower  ext.  Appropriate mood and affect   Extremities:  No cyanosis, clubbing or edema.  Warm extremities and well-perfused          Results Review:        Results from last 7 days   Lab Units 12/31/23  0323 12/30/23  1607 " 12/29/23  0536   SODIUM mmol/L 138 140 139   POTASSIUM mmol/L 4.3 4.8 4.3   CHLORIDE mmol/L 105 106 105   CO2 mmol/L 23.7 25.0 25.1   BUN mg/dL 46* 41* 32*   CREATININE mg/dL 1.98* 1.99* 1.50*   GLUCOSE mg/dL 92 119* 98   CALCIUM mg/dL 8.3* 8.7 8.9     Results from last 7 days   Lab Units 12/29/23  0728 12/29/23  0536   HSTROP T ng/L 508* 473*     Results from last 7 days   Lab Units 12/31/23  0323 12/30/23  1422 12/29/23  0536   WBC 10*3/mm3 5.90 6.46 7.38   HEMOGLOBIN g/dL 10.1* 11.2* 12.5*   HEMATOCRIT % 31.3* 33.5* 36.4*   PLATELETS 10*3/mm3 97* 96* 105*     Results from last 7 days   Lab Units 12/31/23  1005 12/30/23  1423 12/29/23  0912   INR  2.40* 2.80* 3.60*                               I reviewed the patient's new clinical results.        Medication Review:   allopurinol, 100 mg, Oral, Daily  aspirin, 81 mg, Oral, Daily  carvedilol, 12.5 mg, Oral, BID With Meals  senna-docusate sodium, 2 tablet, Oral, BID  sodium chloride, 10 mL, Intravenous, Q12H      Diagnostic imaging:  I personally and Independently reviewed and interpreted the following images:  CXR 12/31/2023: Moderate right pleural.  No mass.      Assessment     Right pleural effusion, moderate.  History of right pleural effusion in the past requiring drainage  Left lung mass?-Not visible on CXR over here  Non-STEMI type I  Anticoagulation with Coumadin.  No clear indication  History of CAD  PIPER  Anemia, unknown chronicity.          Plan       Noted plan by cardiology for heart catheterization when patient's INR allows  Check CT chest W/O contrast to evaluate for pleural effusion/lung mass.  This was ordered yesterday but unfortunately it has not been performed yet.  Check daily INR.  Discussed thoracentesis with family and patient and they were agreeable but to wait for imaging to determine whether the effusion is large enough to drain.  This procedure is not urgently required now and his INR is still elevated.  Will allow the INR to get down and  then perform thoracentesis hopefully tomorrow.  If patient has a lung mass then we will talk to the patient and his family about the options which I discussed briefly with him today (CT-guided FNA VS bronchoscopy).  Thoracentesis ordered for tomorrow.  Oxygen by NC and titrate to keep respiratory >90%  Continue holding Coumadin        Julián Espinoza MD  12/31/23  14:13 EST          This note was dictated utilizing Dragon dictation

## 2024-01-01 ENCOUNTER — APPOINTMENT (OUTPATIENT)
Dept: ULTRASOUND IMAGING | Facility: HOSPITAL | Age: 89
End: 2024-01-01
Payer: MEDICARE

## 2024-01-01 ENCOUNTER — APPOINTMENT (OUTPATIENT)
Dept: CT IMAGING | Facility: HOSPITAL | Age: 89
End: 2024-01-01
Payer: MEDICARE

## 2024-01-01 LAB
ALBUMIN FLD-MCNC: 1.3 G/DL
ANION GAP SERPL CALCULATED.3IONS-SCNC: 8 MMOL/L (ref 5–15)
APPEARANCE FLD: ABNORMAL
BILIRUB UR QL STRIP: NEGATIVE
BUN SERPL-MCNC: 48 MG/DL (ref 8–23)
BUN/CREAT SERPL: 26.2 (ref 7–25)
CALCIUM SPEC-SCNC: 8 MG/DL (ref 8.6–10.5)
CHLORIDE SERPL-SCNC: 108 MMOL/L (ref 98–107)
CHLORIDE UR-SCNC: <20 MMOL/L
CK SERPL-CCNC: 45 U/L (ref 20–200)
CLARITY UR: CLEAR
CO2 SERPL-SCNC: 22 MMOL/L (ref 22–29)
COLOR FLD: YELLOW
COLOR UR: YELLOW
CREAT SERPL-MCNC: 1.83 MG/DL (ref 0.76–1.27)
CREAT UR-MCNC: 96.4 MG/DL
DEPRECATED RDW RBC AUTO: 54.3 FL (ref 37–54)
EGFRCR SERPLBLD CKD-EPI 2021: 34.8 ML/MIN/1.73
ERYTHROCYTE [DISTWIDTH] IN BLOOD BY AUTOMATED COUNT: 14.7 % (ref 12.3–15.4)
GLUCOSE SERPL-MCNC: 85 MG/DL (ref 65–99)
GLUCOSE UR STRIP-MCNC: NEGATIVE MG/DL
HCT VFR BLD AUTO: 30.3 % (ref 37.5–51)
HGB BLD-MCNC: 10.4 G/DL (ref 13–17.7)
HGB UR QL STRIP.AUTO: NEGATIVE
INR PPP: 2.16 (ref 0.9–1.1)
KETONES UR QL STRIP: NEGATIVE
LDH FLD-CCNC: 74 U/L
LDH SERPL-CCNC: 202 U/L (ref 135–225)
LEUKOCYTE ESTERASE UR QL STRIP.AUTO: NEGATIVE
LYMPHOCYTES NFR FLD MANUAL: 87 %
MCH RBC QN AUTO: 34.2 PG (ref 26.6–33)
MCHC RBC AUTO-ENTMCNC: 34.3 G/DL (ref 31.5–35.7)
MCV RBC AUTO: 99.7 FL (ref 79–97)
METHOD: ABNORMAL
MONOS+MACROS NFR FLD: 8 %
NEUTROPHILS NFR FLD MANUAL: 5 %
NITRITE UR QL STRIP: NEGATIVE
NUC CELL # FLD: 703 /MM3
PH UR STRIP.AUTO: 6 [PH] (ref 5–8)
PLATELET # BLD AUTO: 97 10*3/MM3 (ref 140–450)
PMV BLD AUTO: 11.8 FL (ref 6–12)
POTASSIUM SERPL-SCNC: 4.7 MMOL/L (ref 3.5–5.2)
PROT ?TM UR-MCNC: 22.8 MG/DL
PROT FLD-MCNC: 2.7 G/DL
PROT UR QL STRIP: NEGATIVE
PROTHROMBIN TIME: 24.5 SECONDS (ref 11.7–14.2)
RBC # BLD AUTO: 3.04 10*6/MM3 (ref 4.14–5.8)
RBC # FLD AUTO: 6571 /MM3
SODIUM SERPL-SCNC: 138 MMOL/L (ref 136–145)
SODIUM UR-SCNC: 53 MMOL/L
SP GR UR STRIP: 1.02 (ref 1–1.03)
UROBILINOGEN UR QL STRIP: NORMAL
WBC NRBC COR # BLD AUTO: 6 10*3/MM3 (ref 3.4–10.8)

## 2024-01-01 PROCEDURE — 99232 SBSQ HOSP IP/OBS MODERATE 35: CPT | Performed by: NURSE PRACTITIONER

## 2024-01-01 PROCEDURE — 82550 ASSAY OF CK (CPK): CPT | Performed by: INTERNAL MEDICINE

## 2024-01-01 PROCEDURE — 89051 BODY FLUID CELL COUNT: CPT | Performed by: INTERNAL MEDICINE

## 2024-01-01 PROCEDURE — 25010000002 LIDOCAINE 1 % SOLUTION: Performed by: RADIOLOGY

## 2024-01-01 PROCEDURE — 71250 CT THORAX DX C-: CPT

## 2024-01-01 PROCEDURE — 88112 CYTOPATH CELL ENHANCE TECH: CPT | Performed by: INTERNAL MEDICINE

## 2024-01-01 PROCEDURE — 80048 BASIC METABOLIC PNL TOTAL CA: CPT | Performed by: STUDENT IN AN ORGANIZED HEALTH CARE EDUCATION/TRAINING PROGRAM

## 2024-01-01 PROCEDURE — 84300 ASSAY OF URINE SODIUM: CPT | Performed by: INTERNAL MEDICINE

## 2024-01-01 PROCEDURE — 84156 ASSAY OF PROTEIN URINE: CPT | Performed by: INTERNAL MEDICINE

## 2024-01-01 PROCEDURE — 76942 ECHO GUIDE FOR BIOPSY: CPT

## 2024-01-01 PROCEDURE — 0W993ZZ DRAINAGE OF RIGHT PLEURAL CAVITY, PERCUTANEOUS APPROACH: ICD-10-PCS | Performed by: RADIOLOGY

## 2024-01-01 PROCEDURE — 88305 TISSUE EXAM BY PATHOLOGIST: CPT | Performed by: INTERNAL MEDICINE

## 2024-01-01 PROCEDURE — 83615 LACTATE (LD) (LDH) ENZYME: CPT | Performed by: INTERNAL MEDICINE

## 2024-01-01 PROCEDURE — 81003 URINALYSIS AUTO W/O SCOPE: CPT | Performed by: INTERNAL MEDICINE

## 2024-01-01 PROCEDURE — 82042 OTHER SOURCE ALBUMIN QUAN EA: CPT | Performed by: INTERNAL MEDICINE

## 2024-01-01 PROCEDURE — 25810000003 SODIUM CHLORIDE 0.9 % SOLUTION: Performed by: STUDENT IN AN ORGANIZED HEALTH CARE EDUCATION/TRAINING PROGRAM

## 2024-01-01 PROCEDURE — 85610 PROTHROMBIN TIME: CPT | Performed by: NURSE PRACTITIONER

## 2024-01-01 PROCEDURE — 85027 COMPLETE CBC AUTOMATED: CPT | Performed by: STUDENT IN AN ORGANIZED HEALTH CARE EDUCATION/TRAINING PROGRAM

## 2024-01-01 PROCEDURE — 82570 ASSAY OF URINE CREATININE: CPT | Performed by: INTERNAL MEDICINE

## 2024-01-01 PROCEDURE — 84157 ASSAY OF PROTEIN OTHER: CPT | Performed by: INTERNAL MEDICINE

## 2024-01-01 PROCEDURE — 82436 ASSAY OF URINE CHLORIDE: CPT | Performed by: INTERNAL MEDICINE

## 2024-01-01 RX ORDER — LIDOCAINE HYDROCHLORIDE 10 MG/ML
20 INJECTION, SOLUTION INFILTRATION; PERINEURAL ONCE
Status: COMPLETED | OUTPATIENT
Start: 2024-01-01 | End: 2024-01-01

## 2024-01-01 RX ORDER — PHYTONADIONE 5 MG/1
2.5 TABLET ORAL ONCE
Status: DISCONTINUED | OUTPATIENT
Start: 2024-01-01 | End: 2024-01-01

## 2024-01-01 RX ADMIN — Medication 10 ML: at 20:15

## 2024-01-01 RX ADMIN — ALLOPURINOL 100 MG: 100 TABLET ORAL at 09:11

## 2024-01-01 RX ADMIN — ASPIRIN 81 MG: 81 TABLET, COATED ORAL at 09:11

## 2024-01-01 RX ADMIN — LIDOCAINE HYDROCHLORIDE 19 ML: 10 INJECTION, SOLUTION INFILTRATION; PERINEURAL at 15:41

## 2024-01-01 RX ADMIN — SODIUM CHLORIDE 50 ML/HR: 9 INJECTION, SOLUTION INTRAVENOUS at 19:55

## 2024-01-01 RX ADMIN — CARVEDILOL 12.5 MG: 12.5 TABLET, FILM COATED ORAL at 09:11

## 2024-01-01 RX ADMIN — Medication 10 ML: at 09:15

## 2024-01-01 NOTE — PROGRESS NOTES
"    Patient Name: Derian Hendricks  :1934  89 y.o.      Patient Care Team:  Lan De Dios Jr. as PCP - General (Family Medicine)    Chief Complaint: follow up NSTEMI    Interval History: INR 2.1. waste products slightly improved. On oxygen. No SOA no CP    Objective   Vital Signs  Temp:  [97.8 °F (36.6 °C)-98.6 °F (37 °C)] 97.8 °F (36.6 °C)  Heart Rate:  [52-69] 65  Resp:  [17-20] 17  BP: (102-128)/(52-86) 102/86    Intake/Output Summary (Last 24 hours) at 2024 0805  Last data filed at 2024 0555  Gross per 24 hour   Intake 720 ml   Output 300 ml   Net 420 ml     Flowsheet Rows      Flowsheet Row First Filed Value   Admission Height 175.3 cm (69\") Documented at 2023   Admission Weight 98.8 kg (217 lb 14.4 oz) Documented at 20234            Physical Exam:   General Appearance:    Alert, cooperative, in no acute distress   Lungs:     Clear to auscultation.  Normal respiratory effort and rate.      Heart:    Regular rhythm and normal rate, normal S1 and S2, no murmurs, gallops or rubs.     Chest Wall:    No abnormalities observed   Abdomen:     Soft, nontender, positive bowel sounds.     Extremities:   no cyanosis, clubbing or edema.  No marked joint deformities.  Adequate musculoskeletal strength.       Results Review:    Results from last 7 days   Lab Units 24  0415   SODIUM mmol/L 138   POTASSIUM mmol/L 4.7   CHLORIDE mmol/L 108*   CO2 mmol/L 22.0   BUN mg/dL 48*   CREATININE mg/dL 1.83*   GLUCOSE mg/dL 85   CALCIUM mg/dL 8.0*     Results from last 7 days   Lab Units 23  0728 23  0536   HSTROP T ng/L 508* 473*     Results from last 7 days   Lab Units 24   WBC 10*3/mm3 6.00   HEMOGLOBIN g/dL 10.4*   HEMATOCRIT % 30.3*   PLATELETS 10*3/mm3 97*     Results from last 7 days   Lab Units 24  0415 23  1005 23  1423   INR  2.16* 2.40* 2.80*     Results from last 7 days   Lab Units 23  0536   MAGNESIUM mg/dL 2.2                 "   Medication Review:   allopurinol, 100 mg, Oral, Daily  aspirin, 81 mg, Oral, Daily  carvedilol, 12.5 mg, Oral, BID With Meals  senna-docusate sodium, 2 tablet, Oral, BID  sodium chloride, 10 mL, Intravenous, Q12H         hold, 1 each  sodium chloride, 50 mL/hr, Last Rate: 50 mL/hr (12/31/23 1102)        Assessment & Plan   NSTEMI, etiology unknown but type I suspected. No heparin/lovenox given supratherapuetic INR. On ASA and beta blocker.   LBBB, chronicity unknown  Possible lung mass , CT chest ordered to further evaluate. Unilateral pleural effusion , thoracentesis being considered.  CXR pending.  Cardiomyopathy. EF 31-35% with wall motion abnormalities. May not be new. Family says he had an MI (no intervention) 20 years ago and was told his heart was weak at that time. He was also started on warfarin then.   Chronic anticoagulation with warfarin. PAF? LV thrombus (remote )? Indication unclear.   History of CAD  Acute vs chronic kidney disease, baseline unknown. Nephrology consultation prior to contrast? Started on IVF today. Will reassess tomorrow.     No chest pain. I will try to get some labs from [facility] to establish baseline renal function. -- tried yesterday. Had to leave a message.      NPO for possible cath tomorrow (though further lung mass evaluation still ongoing so may need to wait longer). CT chest to be done today. Possible thoracentesis tomorrow.      INR is down to 2.1.     Will follow.       VIHBA Cloud  [facility]  01/01/24  08:05 EST

## 2024-01-01 NOTE — PROGRESS NOTES
01/01/24 1523   Vital Signs   Heart Rate 68   Heart Rate Source Monitor   Resp 18   Resp Rate Source Visual   /70   BP Location Right arm   BP Method Automatic   Patient Position Lying   Oxygen Therapy   SpO2 96 %   Pulse Oximetry Type Intermittent   Device (Oxygen Therapy) (Infant) nasal cannula   Patient Observation   Observations Patient arrived Xray Triage for US Guided Thoracentesis of the right side. Patient denies pain at this time. No problems or concerns noted. Dr Mendiola @ bedside to consent patient.

## 2024-01-01 NOTE — CONSULTS
[facility]                                                                                             Nephrology Initial Consult Note    Patient Identification:  Name: Derina Hendricks MRN: [id number]  Age: 89 y.o. : 1934  Sex: male  Date:2024    Requesting Physician: As per consult order.  Reason for Consultation: CKD, need for IV contrast  Information from:patient/ family/ chart      History of Present Illness: This is a 89 y.o. year old male  without much in the way of medical history or prior labs in the chart.  He was admitted on  and creatinine was 1.5 at that time.  It danny up to 1.99 the next day and is 1.83 today.  Unknown baseline creatinine  Is medical history is otherwise significant for CHF, CAD, diabetes and hypertension.  Came here from an outlying facility for dyspnea, chest tightness, cough.  He was found to have a new lung mass and new pleural effusion.  According to the H&P he received Lasix and some IV contrast prior to transfer here.  Cardiology and pulmonary are following  Thoracentesis is planned and cardiology is recommending cardiac catheterization tomorrow    His lungs were clear on chest x-ray so I think much of his hypoxia may be from his pleural effusion  Agree with gentle IV fluids  Will recheck labs in a.m.  Will try to get some baseline labs to document his baseline creatinine but it does look like he has a history of chronic kidney disease most likely.    He denies any worsening dyspnea.  Has chronic lower extremity edema and takes Lasix at home.  No fevers or chills no cough or congestion.  No nausea or vomiting.  He is not on any NSAIDs at home.    The following medical history and medications personally reviewed by me:    Problem List:     Acute respiratory failure with hypoxia    Chest pain    Pleural effusion    CKD (chronic kidney disease)    HTN  (hypertension)    Shortness of breath    Lung mass    Elevated troponin    Atrial fibrillation    Anemia    Thrombocytopenia      Past Medical History:  Past Medical History:   Diagnosis Date    Atrial fibrillation     CHF (congestive heart failure)     Coronary artery disease     Diabetes mellitus     GERD (gastroesophageal reflux disease)     Gout     Hypertension        Past Surgical History:  Past Surgical History:   Procedure Laterality Date    CHOLECYSTECTOMY      CORONARY ANGIOPLASTY          Home Meds:   Medications Prior to Admission   Medication Sig Dispense Refill Last Dose    allopurinol (ZYLOPRIM) 100 MG tablet Take 1 tablet by mouth Daily.   12/28/2023    atenolol (TENORMIN) 25 MG tablet Take 0.5 tablets by mouth 2 (Two) Times a Day.   12/28/2023    furosemide (LASIX) 20 MG tablet 1 TAB ORAL TWICE A DAY   12/28/2023    metFORMIN (GLUCOPHAGE) 500 MG tablet Take 1 tablet by mouth Daily With Breakfast.   12/28/2023    montelukast (SINGULAIR) 10 MG tablet Take 1 tablet by mouth Every Night.   12/28/2023    spironolactone (ALDACTONE) 25 MG tablet Take 1 tablet by mouth Daily.   12/28/2023    warfarin (COUMADIN) 3 MG tablet Take 0.5 mg by mouth Daily.   12/28/2023       Current Meds:   Current Facility-Administered Medications   Medication Dose Route Frequency Provider Last Rate Last Admin    acetaminophen (TYLENOL) tablet 650 mg  650 mg Oral Q4H PRN Doris Ramirez APRN        Or    acetaminophen (TYLENOL) 160 MG/5ML oral solution 650 mg  650 mg Oral Q4H PRN Doris Ramirez APRN        Or    acetaminophen (TYLENOL) suppository 650 mg  650 mg Rectal Q4H PRN Doris Ramirez APRN        allopurinol (ZYLOPRIM) tablet 100 mg  100 mg Oral Daily María Ordaz MD   100 mg at 01/01/24 0911    aspirin EC tablet 81 mg  81 mg Oral Daily Doris Ramirez APRN   81 mg at 01/01/24 0911    sennosides-docusate (PERICOLACE) 8.6-50 MG per tablet 2 tablet  2 tablet Oral BID Doris Ramirez APRN         And    polyethylene glycol (MIRALAX) packet 17 g  17 g Oral Daily PRN Doris Ramirez APRN        And    bisacodyl (DULCOLAX) EC tablet 5 mg  5 mg Oral Daily PRN Doris Ramirez APRN        And    bisacodyl (DULCOLAX) suppository 10 mg  10 mg Rectal Daily PRN Doris Ramirez APRN        carvedilol (COREG) tablet 12.5 mg  12.5 mg Oral BID With Meals Leana Gaston MD   12.5 mg at 01/01/24 0911    Hold medication  1 each Does not apply Continuous PRN Julián Espinoza MD        nitroglycerin (NITROSTAT) SL tablet 0.4 mg  0.4 mg Sublingual Q5 Min PRN Doris Ramirez APRN        ondansetron (ZOFRAN) injection 4 mg  4 mg Intravenous Q6H PRN Doris Ramirez APRN        phytonadione (MEPHYTON, VITAMIN K) tablet 2.5 mg  2.5 mg Oral Once Isiah Camacho MD        sodium chloride 0.9 % flush 10 mL  10 mL Intravenous Q12H Doris Ramirez APRN   10 mL at 01/01/24 0915    sodium chloride 0.9 % flush 10 mL  10 mL Intravenous PRN Doris Ramirez APRN        sodium chloride 0.9 % infusion 40 mL  40 mL Intravenous PRN Doris Ramirez APRN        sodium chloride 0.9 % infusion  50 mL/hr Intravenous Continuous María Ordaz MD 50 mL/hr at 12/31/23 1102 50 mL/hr at 12/31/23 1102       Allergies:  No Known Allergies    Social History:   Social History     Socioeconomic History    Marital status:    Tobacco Use    Smoking status: Never     Passive exposure: Never    Smokeless tobacco: Never   Vaping Use    Vaping Use: Never used   Substance and Sexual Activity    Alcohol use: Never    Drug use: Never    Sexual activity: Defer        Family History:  History reviewed. No pertinent family history.     Review of Systems: as per HPI, in addition:    General:      Complains of weakness / fatigue,                       No fevers / chills                       no weight loss  HEENT:       no dysphagia / odynophagia  Neck:           normal range of motion, no swelling  Respiratory: no cough /  "congestion                      + Shortness of air                       No wheezing  CV:              No chest pain                       No palpitations  Abdomen/GI: no nausea / vomiting                      No diarrhea / constipation                      No abdominal pain  :             no dysuria / urinary frequency                       No urgency, normal output  Endocrine:   no polyuria / polydipsia,                      No heat or cold intolerance  Skin:           no rashes or skin breakdown   Vascular:   + Edema                     No claudication  Psych:        no depression/ anxiety  Neuro:        no focal weakness, no seizures  Musculoskeletal: no joint pain or deformities      Physical Exam:  Vitals:   Temp (24hrs), Av.1 °F (36.7 °C), Min:97.6 °F (36.4 °C), Max:98.5 °F (36.9 °C)    /63 (BP Location: Right arm, Patient Position: Lying)   Pulse 68   Temp 97.6 °F (36.4 °C) (Oral)   Resp 17   Ht 175.3 cm (69\")   Wt 98.8 kg (217 lb 14.4 oz)   SpO2 (!) 82%   BMI 32.18 kg/m²   Intake/Output:     Intake/Output Summary (Last 24 hours) at 2024 1521  Last data filed at 2024 1230  Gross per 24 hour   Intake 841 ml   Output 300 ml   Net 541 ml        Wt Readings from Last 1 Encounters:   23 0314 98.8 kg (217 lb 14.4 oz)       Exam:    General Appearance:  Awake, alert, oriented x3, no acute distress  Chronically ill-appearing   Head and Face:  Normocephalic, atraumatic, mucus membranes moist, oropharynx clear   Eyes:  No icterus, pupils equal round and reactive to light, extraocular movements intact    ENMT: Moist mucosa, tongue symmetric    Neck: Supple  no jugular venous distention  no thyromegaly   Pulmonary:  Respiratory effort: Normal  Auscultation of lungs: Clear bilaterally  No wheezes  No rhonchi  Good air movement, good expansion   Chest wall:  No tenderness or deformity   Cardiovascular:  Auscultation of the heart: Normal rhythm, no murmurs  2+ edema of bilateral lower " extremities   Abdomen:  Abdomen: soft, non-tender, normal bowel sounds all four quadrants, no masses   Liver and spleen: no hepatosplenomegaly   Musculoskeletal: Digits and nails: normal  Normal range of motion  No joint swelling or gross deformities    Skin: Skin inspection: color normal, no visible rashes or lesions  Skin palpation: texture, turgor normal, no palpable lesions   Lymphatic:  no cervical lymphadenopathy    Psychiatric: Judgement and insight: normal  Orientation to person place and time: normal  Mood and affect: normal       DATA:  Radiology and Labs:  The following labs independently reviewed by me, additional AM labs ordered  Old records independently reviewed showing unknown CKD  The following radiologic studies independently viewed by me, findings chest CT showed large right pleural effusion  Interval notes, chart personally reviewed by me.  I have reviewed and summarized old records as detailed above  Plan of care discussed with patient himself at bedside  New problems include pleural effusion, PIPER on CKD      Risk/ complexity of medical care/ medical decision making: High risk, PIPER on CKD, pleural effusion with need for thoracentesis, need for heart cath tomorrow with need for premedication with IV fluids  Chronic illness with severe exacerbation or progression: CKD and CHF      Labs:   Recent Results (from the past 24 hour(s))   CBC (No Diff)    Collection Time: 01/01/24  4:15 AM    Specimen: Blood   Result Value Ref Range    WBC 6.00 3.40 - 10.80 10*3/mm3    RBC 3.04 (L) 4.14 - 5.80 10*6/mm3    Hemoglobin 10.4 (L) 13.0 - 17.7 g/dL    Hematocrit 30.3 (L) 37.5 - 51.0 %    MCV 99.7 (H) 79.0 - 97.0 fL    MCH 34.2 (H) 26.6 - 33.0 pg    MCHC 34.3 31.5 - 35.7 g/dL    RDW 14.7 12.3 - 15.4 %    RDW-SD 54.3 (H) 37.0 - 54.0 fl    MPV 11.8 6.0 - 12.0 fL    Platelets 97 (L) 140 - 450 10*3/mm3   Basic Metabolic Panel    Collection Time: 01/01/24  4:15 AM    Specimen: Blood   Result Value Ref Range     Glucose 85 65 - 99 mg/dL    BUN 48 (H) 8 - 23 mg/dL    Creatinine 1.83 (H) 0.76 - 1.27 mg/dL    Sodium 138 136 - 145 mmol/L    Potassium 4.7 3.5 - 5.2 mmol/L    Chloride 108 (H) 98 - 107 mmol/L    CO2 22.0 22.0 - 29.0 mmol/L    Calcium 8.0 (L) 8.6 - 10.5 mg/dL    BUN/Creatinine Ratio 26.2 (H) 7.0 - 25.0    Anion Gap 8.0 5.0 - 15.0 mmol/L    eGFR 34.8 (L) >60.0 mL/min/1.73   Protime-INR    Collection Time: 01/01/24  4:15 AM    Specimen: Blood   Result Value Ref Range    Protime 24.5 (H) 11.7 - 14.2 Seconds    INR 2.16 (H) 0.90 - 1.10   Lactate Dehydrogenase    Collection Time: 01/01/24  4:15 AM    Specimen: Blood   Result Value Ref Range     135 - 225 U/L       Radiology:  Imaging Results (Last 24 Hours)       Procedure Component Value Units Date/Time    CT Chest Without Contrast Diagnostic [[id number]] Collected: 01/01/24 1059     Updated: 01/01/24 1111    Narrative:      CT CHEST WO CONTRAST DIAGNOSTIC-     INDICATIONS: Possible lung mass, pleural effusion     TECHNIQUE: Radiation dose reduction techniques were utilized, including  automated exposure control and exposure modulation based on body size.  Unenhanced CHEST CT     COMPARISON: Correlated with chest x-ray from 12/21/2023     FINDINGS:           The heart size is borderline without pericardial effusion. Prominence of  caliber of central pulmonary arteries may reflect pulm arterial  hypertension. Mediastinal lymph nodes are mildly enlarged, for example  1.1 cm short axis, axial image 32, AP window lymph node, 1.2 cm short  axis, image 33, nonspecific, could be reactive in nature or may be  evidence of metastatic disease. Bilateral gynecomastia is conspicuous.     The airways appear clear.     Large right, minimal left pleural effusions are seen.        The lungs show masslike densities in both lungs, with considerations  including neoplasm, rounded atelectasis/pneumonia, initial further  evaluation with PET/CT may be helpful. For example,  pleural-based  masslike density at the right apex on axial image 27 measures 4.8 cm,  and another at the left apex measures 4.3 cm on image 13. Dense  atelectasis/consolidation is seen in the right middle and lower lobes,  possibility of underlying lesions or pneumonia not excluded.  Pleural-based nodular density at the left upper lobe measures 1.1 cm on  image 31.     Upper abdominal structures show at least moderate abdominal ascites,  partly included. Nodular contour of the liver is noted, compatible  cirrhosis. Gallbladder is surgically absent. Left renal cystic lesion is  partly included. Multifocal densities in the intra-abdominal fat, for  example axial image 100, could be result of edema but may represent  omental implants/metastatic disease if there is underlying malignancy.  Splenic venous tortuosity is noted. Nonspecific adenopathy is apparent  in the upper abdomen. For example, gastrohepatic ligament lymph node on  axial image 98 measures 1.5 cm short axis     Degenerative changes are seen in the spine. Deformity of proximal left  humerus suspected to be chronic, but only partly included, correlate  clinically.          Impression:         Large right, minimal left pleural effusions. Masslike densities in both  lungs, as well as nodularity and atelectasis/consolidation, follow-up  evaluation advised. Nonspecific adenopathy as well as possible omental  implants (versus edema in the mesenteric fat). Abdominal ascites.  Hepatic cirrhosis.     This report was finalized on 1/1/2024 11:08 AM by Dr. Gerardo Mendiola M.D on Workstation: [id number]                    ASSESSMENT:   Presumed CKD but patient has no prior history of known CKD, no baseline labs available.  Will attempt to obtain old records from his hospital closer to home.  Moderate risk for contrast nephropathy will start IV fluids at midnight    Acute respiratory failure with hypoxia    Chest pain    Pleural effusion    CKD (chronic kidney  disease)    HTN (hypertension)    Shortness of breath    Lung mass    Elevated troponin    Atrial fibrillation    Anemia    Thrombocytopenia        DISCUSSION/PLAN:   Agree with plans for thoracentesis  Will start IV fluids at midnight tonight in preparation for left heart cath tomorrow  Okay to proceed with left heart cath tomorrow if renal function remains stable  Will check standard workup for CKD including renal ultrasound, urine studies, repeat labs, uric acid  Will also obtain old records to establish a baseline creatinine level    Continue to monitor electrolytes and volume closely    I appreciate the consult request.  Please send me a secure chat message with any nonurgent questions regarding patient care.  For any urgent patient care issues please call my office number below.      Nate Briggs MD  [facility]  Office phone number: [phone]  Answering service phone number: [phone]      1/1/2024        Dictation via Dragon dictation software

## 2024-01-01 NOTE — PROGRESS NOTES
"    DAILY PROGRESS NOTE  [facility]    Patient Identification:  Name: Derian Hendricks  Age: 89 y.o.  Sex: male  :  1934  MRN: [id number]         Primary Care Physician: Lan De Dios Jr.    Subjective:  Interval History: Remains short of breath nothing changes he has not really had too much treatment since transfer.  No significant tobacco history.  Possible job exposures/now retired.  Case discussed at length with family x 3 at bedside including daughter and a couple granddaughters.  No fever no hemoptysis no short of breath with minimal exertion    Objective: I can hear this gentleman from 5 feet away with no need for stethoscope    Scheduled Meds:allopurinol, 100 mg, Oral, Daily  aspirin, 81 mg, Oral, Daily  carvedilol, 12.5 mg, Oral, BID With Meals  senna-docusate sodium, 2 tablet, Oral, BID  sodium chloride, 10 mL, Intravenous, Q12H      Continuous Infusions:hold, 1 each  sodium chloride, 50 mL/hr, Last Rate: 50 mL/hr (23 1102)        Vital signs in last 24 hours:  Temp:  [97.6 °F (36.4 °C)-98.5 °F (36.9 °C)] 97.6 °F (36.4 °C)  Heart Rate:  [52-70] 68  Resp:  [17-20] 17  BP: (102-134)/(52-86) 134/63    Intake/Output:    Intake/Output Summary (Last 24 hours) at 2024 1401  Last data filed at 2024 1230  Gross per 24 hour   Intake 841 ml   Output 300 ml   Net 541 ml       Exam:  /63 (BP Location: Right arm, Patient Position: Lying)   Pulse 68   Temp 97.6 °F (36.4 °C) (Oral)   Resp 17   Ht 175.3 cm (69\")   Wt 98.8 kg (217 lb 14.4 oz)   SpO2 (!) 82%   BMI 32.18 kg/m²     General Appearance:    Alert, cooperative, elderly and frail but conversational and pleasant, nontoxic, AAOx3                          Head:    Normocephalic, without obvious abnormality, atraumatic                           Eyes:    Conjunctivae/corneas clear, EOM's intact, both eyes                         Throat:   Oral mucosa pink and moist                           Neck:   Supple, + JVD    "                      Lungs:    No breath sounds heard from right mid lung down to auscultation bilaterally, respirations labored with audible wheeze/referred upper airway breath sounds with use of accessory muscles                          Heart:    Regular rate and rhythm, S1 and S2 normal                  Abdomen:     Soft, nontender, bowel sounds active                 Extremities: Generalized weakness though moving all, no real pitting edema                        Pulses:   Pulses palpable in all extremities                            Skin:   Skin is warm and dry                  Neurologic:   CNII-XII intact     Data Review:  Labs in chart were reviewed.    Assessment:  Active Hospital Problems    Diagnosis  POA    **Acute respiratory failure with hypoxia [J96.01]  Unknown    Chest pain [R07.9]  Yes    Pleural effusion [J90]  Yes    CKD (chronic kidney disease) [N18.9]  Yes    HTN (hypertension) [I10]  Unknown    Shortness of breath [R06.02]  Yes    Lung mass [R91.8]  Unknown    Elevated troponin [R79.89]  Unknown    Atrial fibrillation [I48.91]  Unknown    Anemia [D64.9]  Unknown    Thrombocytopenia [D69.6]  Unknown      Resolved Hospital Problems   No resolved problems to display.       Plan:    Right upper lobe nodule with large right-sided pleural effusion to a near level of the white out concerns me for undiagnosed lung cancer until proven otherwise   -Pulmonary consulted and following   -Agree with ultrasound thoracentesis -cytology will only be 50% regarding pleural fluid and may require bronchoscopy versus TTS consult for tissue   -Eventual PET   -Procalcitonin 0.15      NSTEMI/acute systolic CHF with known EF 30 to 35% with pulmonary hypertension with a past history of CAD   -Cardiology consulted and considering cath -this point need to wait until pleural fluid is further removed as I am not sure of the reserve this gentleman has especially if likely going to require bronchoscopy and tissue biopsy and  any stent placement could negate further interventions   -Given concerns for CKD, renal consult placed -IVF currently at 50 cc an hour and previous use of Lasix/spironolactone currently on hold   -INR 3.6 -2.5 mg vitamin K today   -Coreg/ASA      ACD/TCP -10.1/97 respectively -unclear baseline and will trend CBC    DM2 -metformin on hold -add SSI    SCDs for prophylaxis    Isiah Camacho MD  1/1/2024  14:01 EST

## 2024-01-01 NOTE — PROGRESS NOTES
"                                              LOS: 3 days   Patient Care Team:  Lan De Dios Jr. as PCP - General (Family Medicine)    Chief Complaint:  F/up lung mass ? and pleural effusion    Subjective   Interval History      On 2 L oxygen.  Denies dyspnea at rest or cough.  However his daughter who was at bedside stated that patient used the bathroom today and he appeared to be huffing and puffing on his way back.    REVIEW OF SYSTEMS:   CARDIOVASCULAR: No chest pain, chest pressure or chest discomfort. No palpitations or edema.   GASTROINTESTINAL: No anorexia, nausea, vomiting or diarrhea. No abdominal pain.  CONSTITUTIONAL: No fever or chills.     Ventilator/Non-Invasive Ventilation Settings (From admission, onward)      None                  Physical Exam:     Vital Signs  Temp:  [97.2 °F (36.2 °C)-98.6 °F (37 °C)] 97.8 °F (36.6 °C)  Heart Rate:  [52-69] 52  Resp:  [17-20] 17  BP: (110-128)/(52-65) 110/52    Intake/Output Summary (Last 24 hours) at 1/1/2024 0737  Last data filed at 1/1/2024 0555  Gross per 24 hour   Intake 720 ml   Output 300 ml   Net 420 ml     Flowsheet Rows      Flowsheet Row First Filed Value   Admission Height 175.3 cm (69\") Documented at 12/29/2023 0314   Admission Weight 98.8 kg (217 lb 14.4 oz) Documented at 12/29/2023 0314            PPE used per hospital policy    General Appearance:   Alert, cooperative, in no acute distress   ENMT:  Mallampati score 3, moist mucous membrane   Eyes:  Pupils equal and reactive to light. EOMI   Neck:   Trachea midline. No thyromegaly.   Lungs:   Diminished air entry on the right with mild crackles.  No wheezing..  No use of accessory muscles    Heart:   Regular rhythm and normal rate, normal S1 and S2, no         murmur   Skin:   No rash or ecchymosis   Abdomen:    Obese. Soft. No tenderness. No HSM.   Neuro/psych:  Conscious, alert, oriented x3. Strength 5/5 in upper and lower  ext.  Appropriate mood and affect   Extremities:  No cyanosis, " clubbing or edema.  Warm extremities and well-perfused          Results Review:        Results from last 7 days   Lab Units 01/01/24  0415 12/31/23  0323 12/30/23  1607   SODIUM mmol/L 138 138 140   POTASSIUM mmol/L 4.7 4.3 4.8   CHLORIDE mmol/L 108* 105 106   CO2 mmol/L 22.0 23.7 25.0   BUN mg/dL 48* 46* 41*   CREATININE mg/dL 1.83* 1.98* 1.99*   GLUCOSE mg/dL 85 92 119*   CALCIUM mg/dL 8.0* 8.3* 8.7     Results from last 7 days   Lab Units 12/29/23  0728 12/29/23  0536   HSTROP T ng/L 508* 473*     Results from last 7 days   Lab Units 01/01/24  0415 12/31/23  0323 12/30/23  1422   WBC 10*3/mm3 6.00 5.90 6.46   HEMOGLOBIN g/dL 10.4* 10.1* 11.2*   HEMATOCRIT % 30.3* 31.3* 33.5*   PLATELETS 10*3/mm3 97* 97* 96*     Results from last 7 days   Lab Units 01/01/24  0415 12/31/23  1005 12/30/23  1423   INR  2.16* 2.40* 2.80*                               I reviewed the patient's new clinical results.        Medication Review:   allopurinol, 100 mg, Oral, Daily  aspirin, 81 mg, Oral, Daily  carvedilol, 12.5 mg, Oral, BID With Meals  senna-docusate sodium, 2 tablet, Oral, BID  sodium chloride, 10 mL, Intravenous, Q12H      Diagnostic imaging:  I personally and Independently reviewed and interpreted the following images:  CXR 12/31/2023: Moderate right pleural.  No mass.  CT chest 1/1/2024: Bilateral pleural effusion, larger on the right and trace on the left.  Bilateral lung masses.    Assessment     Bilateral pleural effusion: Trace on the left.  Large on the right.  Bilateral lung masses.  No clear etiology.  Could represent malignancy or benign round atelectasis.  No prior CT to compare.  Borderline mediastinal adenopathies specifically station 4s  Acute systolic CHF, EF 30-35%.  Pulmonary HTN, RVSP 45 on echo 12/29 with moderately dilated LA consistent with group 2.  Non-STEMI type I  Anticoagulation with Coumadin.  No clear indication  History of CAD  PIPER  Anemia and thrombocytopenia, unknown chronicity.          Plan        Noted plan by cardiology for heart catheterization when patient's INR allows  Ultrasound-guided thoracentesis.  Send fluid for cytology.  Depending on the result of the fluid, we will decide whether patient would require further evaluation such as bronchoscopy or CT-guided biopsy.  We may have to repeat his lung imaging after drainage of the pleural fluid.  PET scan can also be considered.  Check daily INR.  Depending on the result of the  Oxygen by NC and titrate to keep respiratory >90%  Continue holding Coumadin      Discussed with his daughter at bedside.  However the CT scan was not performed when I talked to her.  I will talk to them again tomorrow about the results of the CT.  My initial impression on his CXR that there was no lung masses but obviously this is very clear on the CT chest which again does not necessarily indicate malignancy.  Unfortunately there is no prior imaging on him to compare.    Julián Espinoza MD  01/01/24  07:37 EST          This note was dictated utilizing [employer] dictation

## 2024-01-01 NOTE — PROGRESS NOTES
01/01/24 1558   Vital Signs   Heart Rate 55   Heart Rate Source Monitor   Resp 18   Resp Rate Source Visual   /64   BP Location Right arm   BP Method Automatic   Patient Position Lying   Oxygen Therapy   SpO2 94 %   Pulse Oximetry Type Intermittent   Device (Oxygen Therapy) nasal cannula   Flow (L/min) 3   Patient Observation   Observations Patient returned to Xray triage post US Guided Thoracentesis Rt side. 1.4 liters of fluid removed RUQ bandaid to site clean,dry,intact. Patient denies pain at this time. No problems or concerns noted at this time.

## 2024-01-02 ENCOUNTER — APPOINTMENT (OUTPATIENT)
Dept: OTHER | Facility: HOSPITAL | Age: 89
End: 2024-01-02
Payer: MEDICARE

## 2024-01-02 ENCOUNTER — APPOINTMENT (OUTPATIENT)
Dept: ULTRASOUND IMAGING | Facility: HOSPITAL | Age: 89
End: 2024-01-02
Payer: MEDICARE

## 2024-01-02 ENCOUNTER — APPOINTMENT (OUTPATIENT)
Dept: CT IMAGING | Facility: HOSPITAL | Age: 89
End: 2024-01-02
Payer: MEDICARE

## 2024-01-02 LAB
ALBUMIN SERPL-MCNC: 2.4 G/DL (ref 3.5–5.2)
ANION GAP SERPL CALCULATED.3IONS-SCNC: 8 MMOL/L (ref 5–15)
BUN SERPL-MCNC: 49 MG/DL (ref 8–23)
BUN/CREAT SERPL: 30.1 (ref 7–25)
CALCIUM SPEC-SCNC: 7.8 MG/DL (ref 8.6–10.5)
CHLORIDE SERPL-SCNC: 108 MMOL/L (ref 98–107)
CO2 SERPL-SCNC: 22 MMOL/L (ref 22–29)
CREAT SERPL-MCNC: 1.63 MG/DL (ref 0.76–1.27)
DEPRECATED RDW RBC AUTO: 52.2 FL (ref 37–54)
EGFRCR SERPLBLD CKD-EPI 2021: 40 ML/MIN/1.73
ERYTHROCYTE [DISTWIDTH] IN BLOOD BY AUTOMATED COUNT: 14.6 % (ref 12.3–15.4)
GLUCOSE SERPL-MCNC: 78 MG/DL (ref 65–99)
HCT VFR BLD AUTO: 30 % (ref 37.5–51)
HGB BLD-MCNC: 10.4 G/DL (ref 13–17.7)
INR PPP: 1.7 (ref 0.9–1.1)
MCH RBC QN AUTO: 34 PG (ref 26.6–33)
MCHC RBC AUTO-ENTMCNC: 34.7 G/DL (ref 31.5–35.7)
MCV RBC AUTO: 98 FL (ref 79–97)
PHOSPHATE SERPL-MCNC: 3.3 MG/DL (ref 2.5–4.5)
PLATELET # BLD AUTO: 101 10*3/MM3 (ref 140–450)
PMV BLD AUTO: 11.8 FL (ref 6–12)
POTASSIUM SERPL-SCNC: 4.9 MMOL/L (ref 3.5–5.2)
PROTHROMBIN TIME: 20.3 SECONDS (ref 11.7–14.2)
RBC # BLD AUTO: 3.06 10*6/MM3 (ref 4.14–5.8)
SODIUM SERPL-SCNC: 138 MMOL/L (ref 136–145)
URATE SERPL-MCNC: 6.4 MG/DL (ref 3.4–7)
WBC NRBC COR # BLD AUTO: 5.96 10*3/MM3 (ref 3.4–10.8)

## 2024-01-02 PROCEDURE — 97110 THERAPEUTIC EXERCISES: CPT

## 2024-01-02 PROCEDURE — 85610 PROTHROMBIN TIME: CPT | Performed by: NURSE PRACTITIONER

## 2024-01-02 PROCEDURE — 25810000003 SODIUM CHLORIDE 0.9 % SOLUTION: Performed by: STUDENT IN AN ORGANIZED HEALTH CARE EDUCATION/TRAINING PROGRAM

## 2024-01-02 PROCEDURE — 76775 US EXAM ABDO BACK WALL LIM: CPT

## 2024-01-02 PROCEDURE — 85027 COMPLETE CBC AUTOMATED: CPT | Performed by: STUDENT IN AN ORGANIZED HEALTH CARE EDUCATION/TRAINING PROGRAM

## 2024-01-02 PROCEDURE — 80069 RENAL FUNCTION PANEL: CPT | Performed by: INTERNAL MEDICINE

## 2024-01-02 PROCEDURE — 71250 CT THORAX DX C-: CPT

## 2024-01-02 PROCEDURE — 84550 ASSAY OF BLOOD/URIC ACID: CPT | Performed by: INTERNAL MEDICINE

## 2024-01-02 RX ADMIN — Medication 10 ML: at 21:00

## 2024-01-02 RX ADMIN — CARVEDILOL 12.5 MG: 12.5 TABLET, FILM COATED ORAL at 09:15

## 2024-01-02 RX ADMIN — CARVEDILOL 12.5 MG: 12.5 TABLET, FILM COATED ORAL at 18:03

## 2024-01-02 RX ADMIN — SODIUM CHLORIDE 50 ML/HR: 9 INJECTION, SOLUTION INTRAVENOUS at 05:48

## 2024-01-02 RX ADMIN — ASPIRIN 81 MG: 81 TABLET, COATED ORAL at 09:15

## 2024-01-02 RX ADMIN — Medication 10 ML: at 09:17

## 2024-01-02 NOTE — THERAPY TREATMENT NOTE
Patient Name: Derian Hendricks  : 1934    MRN: [id number]                              Today's Date: 2024       Admit Date: 2023    Visit Dx:     ICD-10-CM ICD-9-CM   1. Follow-up exam  Z09 V67.9     Patient Active Problem List   Diagnosis    Chest pain    Pleural effusion    CKD (chronic kidney disease)    HTN (hypertension)    Shortness of breath    Acute respiratory failure with hypoxia    Lung mass    Elevated troponin    Atrial fibrillation    Anemia    Thrombocytopenia     Past Medical History:   Diagnosis Date    Atrial fibrillation     CHF (congestive heart failure)     Coronary artery disease     Diabetes mellitus     GERD (gastroesophageal reflux disease)     Gout     Hypertension      Past Surgical History:   Procedure Laterality Date    CHOLECYSTECTOMY      CORONARY ANGIOPLASTY        General Information       Row Name 24 1451          Physical Therapy Time and Intention    Document Type therapy note (daily note)  -PC     Mode of Treatment physical therapy  -PC       Row Name 24 1451          General Information    Existing Precautions/Restrictions oxygen therapy device and L/min  -PC       Row Name 24 1451          Cognition    Orientation Status (Cognition) oriented x 4  -PC               User Key  (r) = Recorded By, (t) = Taken By, (c) = Cosigned By      Initials Name Provider Type    PC Aissatou Ann PT Physical Therapist                   Mobility       Row Name 24 1452          Bed Mobility    Supine-Sit [place] (Bed Mobility) standby assist  -PC     Assistive Device (Bed Mobility) head of bed elevated  -PC       Row Name 24 1452          Sit-Stand Transfer    Sit-Stand [place] (Transfers) standby assist  -PC       Row Name 24 1452          Gait/Stairs (Locomotion)    [place] Level (Gait) standby assist  -PC     Distance in Feet (Gait) 100 ft on 2L o2 at first, raised to 4L due to drop in ow sats, then ret to 2L at rest with  ow sats >90%  -PC     Deviations/Abnormal Patterns (Gait) gait speed decreased;stride length decreased;base of support, wide;festinating/shuffling  -PC               User Key  (r) = Recorded By, (t) = Taken By, (c) = Cosigned By      Initials Name Provider Type    PC Aissatou Ann, PT Physical Therapist                   Obj/Interventions    No documentation.                  Goals/Plan    No documentation.                  Clinical Impression       Row Name 01/02/24 1455          Pain    Pretreatment Pain Rating 0/10 - no pain  -PC       Row Name 01/02/24 1455          Plan of Care Review    Plan of Care Reviewed With patient;daughter  -PC     Outcome Evaluation Pt able to walk today without assistive device with stand by assist, needed O2 while ambulating and O2 raised to 4L o2 during activity as pt had a drop in o2 to 84%. PT will cont to follow  -PC       Row Name 01/02/24 1456          Vital Signs    Pre SpO2 (%) 93  -PC     O2 Delivery Pre Treatment supplemental O2  -PC     Intra SpO2 (%) 84  -PC     O2 Delivery Intra Treatment supplemental O2  -PC     Post SpO2 (%) 92  -PC     O2 Delivery Post Treatment supplemental O2  -PC       Row Name 01/02/24 1458          Positioning and Restraints    Pre-Treatment Position in bed  -PC     Post Treatment Position chair  -PC     In Chair reclined;call light within reach;encouraged to call for assist;with family/caregiver  -PC               User Key  (r) = Recorded By, (t) = Taken By, (c) = Cosigned By      Initials Name Provider Type    PC Aissatou Ann, PT Physical Therapist                   Outcome Measures       Row Name 01/02/24 5689          How much help from another person do you currently need...    Turning from your back to your side while in flat bed without using bedrails? 3  -PC     Moving from lying on back to sitting on the side of a flat bed without bedrails? 3  -PC     Moving to and from a bed to a chair (including a wheelchair)? 3  -PC     Standing  up from a chair using your arms (e.g., wheelchair, bedside chair)? 3  -PC     Climbing 3-5 steps with a railing? 3  -PC     To walk in hospital room? 3  -PC     AM-PAC 6 Clicks Score (PT) 18  -PC     Highest Level of Mobility Goal 6 --> Walk 10 steps or more  -PC               User Key  (r) = Recorded By, (t) = Taken By, (c) = Cosigned By      Initials Name Provider Type    PC Aissatou Ann, PT Physical Therapist                                 Physical Therapy Education       Title: PT OT SLP Therapies (Done)       Topic: Physical Therapy (Done)       Point: Mobility training (Done)       Learning Progress Summary             Patient Acceptance, E,D, DU by PC at 1/2/2024 1457    Acceptance, E, VU by ER at 12/31/2023 1038                         Point: Home exercise program (Done)       Learning Progress Summary             Patient Acceptance, E, VU by ER at 12/31/2023 1038                         Point: Body mechanics (Done)       Learning Progress Summary             Patient Acceptance, E, VU by ER at 12/31/2023 1038                         Point: Precautions (Done)       Learning Progress Summary             Patient Acceptance, E, VU by ER at 12/31/2023 1038    Acceptance, E, VU by MG at 12/29/2023 1527                                         User Key       Initials Effective Dates Name Provider Type Discipline    PC 06/16/21 -  Aissatou Ann, PT Physical Therapist PT    MG 05/24/22 -  Ignacia Crisostomo, PT Physical Therapist PT    ER 10/15/23 -  Chelly Brooks, PT Physical Therapist PT                  PT Recommendation and Plan     Plan of Care Reviewed With: patient, daughter  Outcome Evaluation: Pt able to walk today without assistive device with stand by assist, needed O2 while ambulating and O2 raised to 4L o2 during activity as pt had a drop in o2 to 84%. PT will cont to follow     Time Calculation:         PT Charges       Row Name 01/02/24 5821             Time Calculation    Start Time 1435  -PC      Stop  Time 1449  -PC      Time Calculation (min) 14 min  -PC      PT Received On 01/02/24  -PC      PT - Next Appointment 01/03/24  -PC                User Key  (r) = Recorded By, (t) = Taken By, (c) = Cosigned By      Initials Name Provider Type    PC Aissatou Ann PT Physical Therapist                  Therapy Charges for Today       Code Description Service Date Service Provider Modifiers Qty    64192526456 HC PT THER PROC EA 15 MIN 1/2/2024 Aissatou Ann PT GP 1            PT G-Codes  AM-PAC 6 Clicks Score (PT): 18       Aissatou Ann, PT  1/2/2024

## 2024-01-02 NOTE — PROGRESS NOTES
Discussed with the daughter at bedside again.  We talked about possibly obtaining biopsies from either the mediastinal/hilar adenopathies or the lung masses but again taking into consideration that patient will unlikely be a candidate for treatment should this be malignant.  She is not sure whether she would want to put him through that for now and she would like to speak to the rest of the family again.  She is agreeable with draining the pleural fluid again for symptomatic relief.  Thoracentesis ordered and we will get cytology again.

## 2024-01-02 NOTE — CONSULTS
FIRST [facility]OLOGY CONSULT      Patient Identification:  NAME:  Derian Hendricks  Age:  89 y.o.   Sex:  male   :  1934   MRN:  [id number]     Chief complaint: Shortness of breath    History of present illness:      Pt is a 89 y.o. male with a history of CHF that was admitted on 23 with pleural effusion. Urology was consulted for evaluation and treatment of possible bladder mass found on JAYDA. Patient denies personal or family history of bladder cancer, renal cancer or prostate cancer. Patient does not have a smoking history. Pt has noticed unexplained weight-loss over the last several months. Pt denies any other urinary complaints, or gross hematuria. Family at bedside.     In hospital:  -AVSS, good UOP  -WBC - 5.96  -Creat - 1.63  -UA - Negative leukocytes, negative nitrites    -JAYDA -  Asymmetric echogenicity within the posterior aspect of the bladder on the right. The bladder is underdistended and not well evaluated; however, underlying neoplasm cannot be excluded. Hypoechoic left renal lesion with findings suggestive of a cyst;  however, remains incompletely characterized. Findings can be better evaluated with CT of the abdomen to exclude neoplasm if clinically indicated. Findings of chronic medical renal disease and renal atrophy.    Asked to see    Past medical history:  Past Medical History:   Diagnosis Date    Atrial fibrillation     CHF (congestive heart failure)     Coronary artery disease     Diabetes mellitus     GERD (gastroesophageal reflux disease)     Gout     Hypertension        Past surgical history:  Past Surgical History:   Procedure Laterality Date    CHOLECYSTECTOMY      CORONARY ANGIOPLASTY         Allergies:  Patient has no known allergies.    Home medications:  Medications Prior to Admission   Medication Sig Dispense Refill Last Dose    allopurinol (ZYLOPRIM) 100 MG tablet Take 1 tablet by mouth Daily.   2023    atenolol (TENORMIN) 25 MG tablet Take 0.5 tablets by mouth  2 (Two) Times a Day.   2023    furosemide (LASIX) 20 MG tablet 1 TAB ORAL TWICE A DAY   2023    metFORMIN (GLUCOPHAGE) 500 MG tablet Take 1 tablet by mouth Daily With Breakfast.   2023    montelukast (SINGULAIR) 10 MG tablet Take 1 tablet by mouth Every Night.   2023    spironolactone (ALDACTONE) 25 MG tablet Take 1 tablet by mouth Daily.   2023    warfarin (COUMADIN) 3 MG tablet Take 0.5 mg by mouth Daily.   2023        Hospital medications:  allopurinol, 100 mg, Oral, Daily  aspirin, 81 mg, Oral, Daily  carvedilol, 12.5 mg, Oral, BID With Meals  senna-docusate sodium, 2 tablet, Oral, BID  sodium chloride, 10 mL, Intravenous, Q12H      hold, 1 each        acetaminophen **OR** acetaminophen **OR** acetaminophen    senna-docusate sodium **AND** polyethylene glycol **AND** bisacodyl **AND** bisacodyl    hold    nitroglycerin    ondansetron    sodium chloride    sodium chloride    Family history:  History reviewed. No pertinent family history.    Social history:  Social History     Tobacco Use    Smoking status: Never     Passive exposure: Never    Smokeless tobacco: Never   Vaping Use    Vaping Use: Never used   Substance Use Topics    Alcohol use: Never    Drug use: Never       Review of systems:      12 point negative except as in HPI    Objective:  TMax 24 hours:   Temp (24hrs), Av.2 °F (36.8 °C), Min:97.8 °F (36.6 °C), Max:98.7 °F (37.1 °C)      Vitals Ranges:   Temp:  [97.8 °F (36.6 °C)-98.7 °F (37.1 °C)] 97.9 °F (36.6 °C)  Heart Rate:  [55-68] 56  Resp:  [16-18] 16  BP: (107-138)/(52-70) 116/56    Intake/Output Last 3 shifts:  I/O last 3 completed shifts:  In: 841 [P.O.:841]  Out: 1700 [Urine:300; Other:1400]     Physical Exam:    General Appearance:    Alert, cooperative, NAD   Abdomen:  :      Soft, NDNT, no masses, no guarding    Bladder non-distended, non-tender   Neuro/Psych:   Orientation intact, mood/affect pleasant       Results review:   I reviewed the patient's  new clinical results.    Data review:  Lab Results (last 24 hours)       Procedure Component Value Units Date/Time    Non-gynecologic Cytology [[id number]] Collected: 01/01/24 1542    Specimen: Pleural Fluid from Pleural Cavity Updated: 01/02/24 0911    Protime-INR [[id number]]  (Abnormal) Collected: 01/02/24 0325    Specimen: Blood Updated: 01/02/24 0446     Protime 20.3 Seconds      INR 1.70    Renal Function Panel [[id number]]  (Abnormal) Collected: 01/02/24 0325    Specimen: Blood Updated: 01/02/24 0446     Glucose 78 mg/dL      BUN 49 mg/dL      Creatinine 1.63 mg/dL      Sodium 138 mmol/L      Potassium 4.9 mmol/L      Comment: Slight hemolysis detected by analyzer. Result may be falsely elevated.        Chloride 108 mmol/L      CO2 22.0 mmol/L      Calcium 7.8 mg/dL      Albumin 2.4 g/dL      Phosphorus 3.3 mg/dL      Anion Gap 8.0 mmol/L      BUN/Creatinine Ratio 30.1     eGFR 40.0 mL/min/1.73     Narrative:      GFR Normal >60  Chronic Kidney Disease <60  Kidney Failure <15    The GFR formula is only valid for adults with stable renal function between ages 18 and 70.    Uric Acid [[id number]]  (Normal) Collected: 01/02/24 0325    Specimen: Blood Updated: 01/02/24 0446     Uric Acid 6.4 mg/dL     CBC (No Diff) [[id number]]  (Abnormal) Collected: 01/02/24 0325    Specimen: Blood Updated: 01/02/24 0436     WBC 5.96 10*3/mm3      RBC 3.06 10*6/mm3      Hemoglobin 10.4 g/dL      Hematocrit 30.0 %      MCV 98.0 fL      MCH 34.0 pg      MCHC 34.7 g/dL      RDW 14.6 %      RDW-SD 52.2 fl      MPV 11.8 fL      Platelets 101 10*3/mm3     Chloride, Urine, Random - Urine, Clean Catch [[id number]] Collected: 01/01/24 1925    Specimen: Urine, Clean Catch Updated: 01/01/24 2051     Chloride, Urine <20 mmol/L     Narrative:      Reference intervals for random urine have not been established.  Clinical usage is dependent upon physician's interpretation in combination with other laboratory tests.       Sodium, Urine, Random -  Urine, Clean Catch [[id number]] Collected: 01/01/24 1925    Specimen: Urine, Clean Catch Updated: 01/01/24 2044     Sodium, Urine 53 mmol/L     Narrative:      Reference intervals for random urine have not been established.  Clinical usage is dependent upon physician's interpretation in combination with other laboratory tests.       Protein, Urine, Random - Urine, Clean Catch [[id number]] Collected: 01/01/24 1925    Specimen: Urine, Clean Catch Updated: 01/01/24 1957     Total Protein, Urine 22.8 mg/dL     Narrative:      Reference intervals for random urine have not been established.  Clinical usage is dependent upon physician's interpretation in combination with other laboratory tests.       Creatinine Urine Random (kidney function) GFR component - Urine, Clean Catch [[id number]] Collected: 01/01/24 1925    Specimen: Urine, Clean Catch Updated: 01/01/24 1957     Creatinine, Urine 96.4 mg/dL     Narrative:      Reference intervals for random urine have not been established.  Clinical usage is dependent upon physician's interpretation in combination with other laboratory tests.       Urinalysis With Microscopic If Indicated (No Culture) - Urine, Clean Catch [[id number]]  (Normal) Collected: 01/01/24 1925    Specimen: Urine, Clean Catch Updated: 01/01/24 1938     Color, UA Yellow     Appearance, UA Clear     pH, UA 6.0     Specific Gravity, UA 1.020     Glucose, UA Negative     Ketones, UA Negative     Bilirubin, UA Negative     Blood, UA Negative     Protein, UA Negative     Leuk Esterase, UA Negative     Nitrite, UA Negative     Urobilinogen, UA 1.0 E.U./dL    Narrative:      Urine microscopic not indicated.    Body Fluid Cell Count With Differential - Pleural Fluid, Pleural Cavity [[id number]]  (Abnormal) Collected: 01/01/24 1542    Specimen: Pleural Fluid from Pleural Cavity Updated: 01/01/24 9025    Narrative:      The following orders were created for panel order Body Fluid Cell Count With Differential - Pleural  Fluid, Pleural Cavity.  Procedure                               Abnormality         Status                     ---------                               -----------         ------                     Body fluid cell count - ...[[id number]]  Abnormal            Final result               Body fluid differential ...[[id number]]                      Final result                 Please view results for these tests on the individual orders.    Body fluid differential - Pleural Fluid, Pleural Cavity [[id number]] Collected: 01/01/24 1542    Specimen: Pleural Fluid from Pleural Cavity Updated: 01/01/24 1755     Neutrophils, Fluid 5 %      Lymphocytes, Fluid 87 %      Mononuclear, Fluid 8 %     Albumin, Fluid - Pleural Fluid, Pleural Cavity [[id number]] Collected: 01/01/24 1542    Specimen: Pleural Fluid from Pleural Cavity Updated: 01/01/24 1737     Albumin, Fluid 1.30 g/dL     Narrative:      No Reference Ranges Established.    A Serous fluid albumin gradient (serum albumin-fluid) <1.1 g/dL suggests the fluid is an exudate.  Cirrhosis usually results in an ascites fluid albumin gradient >1.1 g/dL.    This test was developed, its performance characteristics determined and judged suitable for clinical purposes by [facility].  It has not been cleared or approved by the FDA.  The laboratory is regulated under CLIA as qualified to perfom high-complexity testing.      Protein, Body Fluid - Pleural Fluid, Pleural Cavity [[id number]] Collected: 01/01/24 1542    Specimen: Pleural Fluid from Pleural Cavity Updated: 01/01/24 1737     Protein, Total, Fluid 2.7 g/dL     Narrative:      No Reference Ranges Established.    A serous fluid total fluid (TP) greater than 50 percent of the serum TP suggests the fluid is an exudate.      1. Pleural TP/Serum TP >0.5  2. Pleural LD/Serum LD >0.6  3. Pleural LD >2/3 of the upper limit of normal for serum LDH    This test was developed, it performance characteristics determined  and judged suitable for clinical purposes by [facility].  It has not been cleared or approved by the FDA.  The laboratory is regulated under CLIA as qualified to perform high-complexity testing.     Lactate Dehydrogenase, Body Fluid - Pleural Fluid, Pleural Cavity [[id number]] Collected: 01/01/24 1542    Specimen: Pleural Fluid from Pleural Cavity Updated: 01/01/24 1737     Lactate Dehydrogenase (LD), Fluid 74 U/L     Narrative:      No Reference Ranges Established.    Serous fluid LDH greater than 60 percent of the serum LDH or serous fluid LDH two-thirds of the upper limit of normal for serum LDH suggests the fluid is an exudate.     1. Pleural TP/Serum TP >0.5  2. Pleural LD/Serum LD >0.6  3. Pleural LD >2/3 of the upper limit of normal for serum LDH    This test was developed, it performance characteristics determined and judged suitable for clinical purposes by [facility].  It has not been cleared or approved by the FDA.  The laboratory is regulated under CLIA as qualified to perform high-complexity testing.     Body fluid cell count - Pleural Fluid, Pleural Cavity [[id number]]  (Abnormal) Collected: 01/01/24 1542    Specimen: Pleural Fluid from Pleural Cavity Updated: 01/01/24 1718     Color, Fluid Yellow     Appearance, Fluid Cloudy     RBC, Fluid 6,571 /mm3      Nucleated Cells, Fluid 703 /mm3      Method: UF 1000i Automated Method    Narrative:      No reference range established. Physician to interpret results with clinical findings.  This test was developed, its performance characteristics determined and judged suitable for clinical purposes by [facility]. It has not been cleared or approved by the FDA. The laboratory is regulated under CLIA as qualified to perform high-complexity testing.    CK [[id number]]  (Normal) Collected: 01/01/24 0415    Specimen: Blood Updated: 01/01/24 1628     Creatine Kinase 45 U/L               Imaging:  Imaging Results (Last 24 Hours)       Procedure Component Value Units Date/Time    CT Outside Films [[id number]] Resulted: 01/02/24 1252     Updated: 01/02/24 1304    Narrative:      This procedure was auto-finalized with no dictation required.    CT Outside Films [[id number]] Resulted: 01/02/24 1258     Updated: 01/02/24 1304    Narrative:      This procedure was auto-finalized with no dictation required.    XR Outside Films [[id number]] Resulted: 01/02/24 1301     Updated: 01/02/24 1304    Narrative:      This procedure was auto-finalized with no dictation required.    CT Chest Without Contrast Diagnostic [[id number]] Collected: 01/02/24 1227     Updated: 01/02/24 1256    Narrative:      CT CHEST WITHOUT CONTRAST     HISTORY: Eval nodules post thoracentesis.     TECHNIQUE: Radiation dose reduction techniques were utilized, including  automated exposure control and exposure modulation based on body size.   3 mm images were obtained through the chest without the administration  of IV contrast.     COMPARISON: CT chest 1/1/2024        FINDINGS:     Right hilar adenopathy measuring up to 1.5 cm (series 3/image 48). Left  hilar adenopathy measuring up to 1.4 cm (series 2/image 38).  Subcentimeter mediastinal lymph nodes.     Left ventricular enlargement. Severe coronary artery calcifications.  Trace pericardial fluid. Nondilated main pulmonary artery and thoracic  aorta.     Decreased large right and unchanged small left pleural effusions.     Right lower lobe remains mostly collapsed. Left upper lobe 4.5 x 3.6 cm  consolidation (series 3/image 21) previously 3.9 x 3.3 cm with  remeasurement. Right upper lobe 6.5 x 3.6 cm consolidation (series  3/image 32) previously 6.7 x 3.8 cm with remeasurement. Unchanged  bilateral central groundglass opacities and mild left lower lobe  interlobular septal thickening.     Cirrhotic liver morphology with upper abdominal portosystemic  collaterals, small volume abdominal  ascites and splenomegaly (15 cm).  Bilateral gynecomastia.          Impression:      1. Decreased large right and unchanged small left pleural effusions.  Right lower lobe remains mostly collapsed.  2. Near identical areas of consolidation the bilateral upper lobes. Both  pneumonitis/pneumonia (favored given bilaterality) and malignancy remain  in the differential. Recommend close imaging follow-up following  completion of any treatment to ensure clearing.  3. Indeterminate bilateral hilar adenopathy. Attention on follow-up.  4. Unchanged mild pulmonary edema  5. Hepatic cirrhosis with sequela of portal hypertension.     This report was finalized on 1/2/2024 12:53 PM by Dr. Kristopher Redd M.D on Workstation: [id number]        Renal Bilateral [[id number]] Collected: 01/02/24 0912     Updated: 01/02/24 0933    Narrative:      RENAL ULTRASOUND     HISTORY: Acute renal failure     COMPARISON: CT chest 1/1/2024     TECHNIQUE: Grayscale, color Doppler images of the kidneys and bladder  were obtained.     FINDINGS:  Grayscale and color Doppler images of the kidneys and bladder were  obtained.     The right kidney measures 10.8 cm in length.     The left kidney measures 12.5 cm in length.     The kidneys demonstrate increased echogenicity and decreased cortical  thickness. There is no hydronephrosis. A hypoechoic lesion within the  superior aspect of the left kidney measures up to 5.4 cm. The visualized  portion of this lesion demonstrated cystic density on recent CT. No  definite internal color Doppler flow is demonstrated on provided images.  Increased through transmission is present.     There appears to be somewhat focal area of asymmetric echogenicity  within the posterior aspect of the bladder on the right. Incidental note  is made of a cirrhotic appearance of the liver. At least moderate amount  of ascites is present within the visualized abdomen     Visualized portions of the aorta and IVC are normal in caliber  and  appearance.       Impression:      1.  Asymmetric echogenicity within the posterior aspect of the bladder  on the right. The bladder is underdistended and not well evaluated;  however, underlying neoplasm cannot be excluded. Urologic consultation  is recommended.  2.  Hypoechoic left renal lesion with findings suggestive of a cyst;  however, remains incompletely characterized. Findings can be better  evaluated with CT of the abdomen to exclude neoplasm if clinically  indicated.  3.  Findings of chronic medical renal disease and renal atrophy.  4.  Other findings above.     This report was finalized on 1/2/2024 9:30 AM by Dr. Roberto Cast M.D  on Workstation: [id number]       US Thoracentesis [[id number]] Collected: 01/01/24 1607    Specimen: Body Fluid Updated: 01/01/24 1630    Narrative:      US THORACENTESIS-     INDICATIONS: Pleural effusion      FINDINGS:     Following a detailed discussion of the proposed procedure with the  patient, verbal and written informed consent was obtained.     The patient was placed in a seated upright position, final timeout was  performed verifying patient identity and procedure, using ultrasound  guidance a location was chosen over the right aspect of the chest,  revealing pleural effusion. The overlying skin was cleaned and  anesthetized, and a thoracentesis needle set was advanced into the  pleural space revealing clear yellow fluid. 1400 cc was removed and sent  to the laboratory for further evaluation. The catheter was removed  intact.     The patient remained asymptomatic throughout the procedure.             Impression:         Successful right thoracentesis.           This report was finalized on 1/1/2024 4:08 PM by Dr. Gerardo Mendiola M.D on Workstation: [id number]                  Assessment:     Bladder mass    Plan:     - No urgent urologic surgical intervention recommended at this time  - CT abd/pelvic without contrast ordered  - Cystoscopy while here if possible    - Urology will continue to follow    Ignacia Degroot, VIBHA  01/02/24  14:37 EST

## 2024-01-02 NOTE — PROGRESS NOTES
"    Patient Name: Derian Hendricks  :1934  89 y.o.      Patient Care Team:  Lan De Dios Jr. as PCP - General (Family Medicine)    Chief Complaint: follow up NSTEMI    Interval History: resting comfortably     Objective   Vital Signs  Temp:  [97.8 °F (36.6 °C)-98.7 °F (37.1 °C)] 97.9 °F (36.6 °C)  Heart Rate:  [56-67] 56  Resp:  [16-18] 16  BP: (107-127)/(52-67) 116/56    Intake/Output Summary (Last 24 hours) at 2024 1607  Last data filed at 2024 1213  Gross per 24 hour   Intake 720 ml   Output --   Net 720 ml     Flowsheet Rows      Flowsheet Row First Filed Value   Admission Height 175.3 cm (69\") Documented at 2023   Admission Weight 98.8 kg (217 lb 14.4 oz) Documented at 2023 031            Physical Exam:   General Appearance:    Alert, cooperative, in no acute distress   Lungs:     Clear to auscultation.  Normal respiratory effort and rate.      Heart:    Regular rhythm and normal rate, normal S1 and S2, no murmurs, gallops or rubs.     Chest Wall:    No abnormalities observed   Abdomen:     Soft, nontender, positive bowel sounds.     Extremities:   no cyanosis, clubbing or edema.  No marked joint deformities.  Adequate musculoskeletal strength.       Results Review:    Results from last 7 days   Lab Units 24   SODIUM mmol/L 138   POTASSIUM mmol/L 4.9   CHLORIDE mmol/L 108*   CO2 mmol/L 22.0   BUN mg/dL 49*   CREATININE mg/dL 1.63*   GLUCOSE mg/dL 78   CALCIUM mg/dL 7.8*     Results from last 7 days   Lab Units 24  0728 23  0536   CK TOTAL U/L 45  --   --    HSTROP T ng/L  --  508* 473*     Results from last 7 days   Lab Units 24   WBC 10*3/mm3 5.96   HEMOGLOBIN g/dL 10.4*   HEMATOCRIT % 30.0*   PLATELETS 10*3/mm3 101*     Results from last 7 days   Lab Units 245 24/23  1005   INR  1.70* 2.16* 2.40*     Results from last 7 days   Lab Units 23  0536   MAGNESIUM mg/dL 2.2                 "   Medication Review:   allopurinol, 100 mg, Oral, Daily  aspirin, 81 mg, Oral, Daily  carvedilol, 12.5 mg, Oral, BID With Meals  senna-docusate sodium, 2 tablet, Oral, BID  sodium chloride, 10 mL, Intravenous, Q12H         hold, 1 each  hold, 1 each        Assessment & Plan   NSTEMI, etiology unknown but type I suspected. No heparin/lovenox given supratherapuetic INR. On ASA and beta blocker.   LBBB, chronicity unknown  Possible lung mass , CT chest ordered to further evaluate. Unilateral pleural effusion , thoracentesis being considered.  CXR pending.  Cardiomyopathy. EF 31-35% with wall motion abnormalities. May not be new. Family says he had an MI (no intervention) 20 years ago and was told his heart was weak at that time. He was also started on warfarin then.   Chronic anticoagulation with warfarin. PAF? LV thrombus (remote )? Indication unclear.   History of CAD  Acute vs chronic kidney disease, baseline unknown. Started on IVF today. Will reassess tomorrow.     Discussed in detail with Dr. Camacho and dr. Espinoza.   The echo shows anterior akinesis. This is likely a remote event. Without angina, there is not an urgency for cardiac catheterization. In light of ongoing malignancy workup, will hold off on angiography/pci for now.   I see no reason for Coumadin therapy. Do not restart.     Leana Gaston MD  [facility]  01/02/24  16:07 EST     Pain Refusal Text: I offered to prescribe pain medication but the patient refused to take this medication.

## 2024-01-02 NOTE — PLAN OF CARE
Goal Outcome Evaluation:  Plan of Care Reviewed With: patient, daughter           Outcome Evaluation: Pt able to walk today without assistive device with stand by assist, needed O2 while ambulating and O2 raised to 4L o2 during activity as pt had a drop in o2 to 84%. PT will cont to follow

## 2024-01-02 NOTE — PROGRESS NOTES
"   LOS: 4 days     Chief Complaint/ Reason for encounter: PIPER/CKD, abnormal renal ultrasound    Subjective   01/02/24 : He is doing about the same today, denies complaints  Shortness of breath improved after 1.4 L thoracentesis  Now with lung nodules concerning for possible lung cancer and possible malignant pleural effusion  No edema.  He was started on IV fluids in preparation for heart cath but that is now on hold and his diet has been restarted      Medical history reviewed:  History of Present Illness    Subjective    History taken from: Patient and chart    Vital Signs  Temp:  [97.8 °F (36.6 °C)-98.7 °F (37.1 °C)] 97.9 °F (36.6 °C)  Heart Rate:  [55-68] 56  Resp:  [16-18] 16  BP: (107-138)/(52-70) 116/56       Wt Readings from Last 1 Encounters:   12/29/23 0314 98.8 kg (217 lb 14.4 oz)       Objective:  Vital signs: (most recent): Blood pressure 116/56, pulse 56, temperature 97.9 °F (36.6 °C), temperature source Oral, resp. rate 16, height 175.3 cm (69\"), weight 98.8 kg (217 lb 14.4 oz), SpO2 93%.                Objective:  General Appearance:  Comfortable, well-appearing, in no acute distress and not in pain.  Awake, alert, oriented  HEENT: Mucous membranes moist, no injury, oropharynx clear  Lungs:  Normal effort and normal respiratory rate.  Breath sounds clear to auscultation.  No  respiratory distress.  No rales, decreased breath sounds or rhonchi.    Heart: Normal rate.  Regular rhythm.  S1, S2 normal.  No murmur.   Abdomen: Abdomen is soft.  Bowel sounds are normal, no abdominal tenderness.  There is no rebound or guarding  Extremities: Trace edema of bilateral lower extremities  Neurological: No focal motor or sensory deficits, pupils reactive  Skin:  Warm and dry.  No rash or cyanosis.       Results Review:    Intake/Output:     Intake/Output Summary (Last 24 hours) at 1/2/2024 1218  Last data filed at 1/2/2024 1213  Gross per 24 hour   Intake 1080 ml   Output 1400 ml   Net -320 ml "         DATA:  Radiology and Labs:  The following labs independently reviewed by me. Additional labs ordered for tomorrow a.m.  Interval notes, chart personally reviewed by me.   Old records independently reviewed showing unknown CKD.  Awaiting records  The following radiologic studies independently viewed by me, findings renal ultrasound was abnormal.  Kidneys normal size but with increased echogenicity and decreased cortical thickness compatible with CKD.  No hydronephrosis.  There was a lesion in the left kidney  New problems include echogenicity involving the bladder, probable renal cyst  Discussed with patient himself at bedside    Risk/ complexity of medical care/ medical decision making moderate complexity, abnormal renal imaging, possible new lung cancer, status post thoracentesis, eventual need for heart catheterization with IV contrast      Labs:   Recent Results (from the past 24 hour(s))   Albumin, Fluid - Pleural Fluid, Pleural Cavity    Collection Time: 01/01/24  3:42 PM    Specimen: Pleural Cavity; Pleural Fluid   Result Value Ref Range    Albumin, Fluid 1.30 g/dL   Protein, Body Fluid - Pleural Fluid, Pleural Cavity    Collection Time: 01/01/24  3:42 PM    Specimen: Pleural Cavity; Pleural Fluid   Result Value Ref Range    Protein, Total, Fluid 2.7 g/dL   Lactate Dehydrogenase, Body Fluid - Pleural Fluid, Pleural Cavity    Collection Time: 01/01/24  3:42 PM    Specimen: Pleural Cavity; Pleural Fluid   Result Value Ref Range    Lactate Dehydrogenase (LD), Fluid 74 U/L   Body fluid cell count - Pleural Fluid, Pleural Cavity    Collection Time: 01/01/24  3:42 PM    Specimen: Pleural Cavity; Pleural Fluid   Result Value Ref Range    Color, Fluid Yellow     Appearance, Fluid Cloudy (A) Clear    RBC, Fluid 6,571 /mm3    Nucleated Cells, Fluid 703 /mm3    Method: UF 1000i Automated Method    Body fluid differential - Pleural Fluid, Pleural Cavity    Collection Time: 01/01/24  3:42 PM    Specimen: Pleural  Cavity; Pleural Fluid   Result Value Ref Range    Neutrophils, Fluid 5 %    Lymphocytes, Fluid 87 %    Mononuclear, Fluid 8 %   Urinalysis With Microscopic If Indicated (No Culture) - Urine, Clean Catch    Collection Time: 01/01/24  7:25 PM    Specimen: Urine, Clean Catch   Result Value Ref Range    Color, UA Yellow Yellow, Straw    Appearance, UA Clear Clear    pH, UA 6.0 5.0 - 8.0    Specific Gravity, UA 1.020 1.005 - 1.030    Glucose, UA Negative Negative    Ketones, UA Negative Negative    Bilirubin, UA Negative Negative    Blood, UA Negative Negative    Protein, UA Negative Negative    Leuk Esterase, UA Negative Negative    Nitrite, UA Negative Negative    Urobilinogen, UA 1.0 E.U./dL 0.2 - 1.0 E.U./dL   Sodium, Urine, Random - Urine, Clean Catch    Collection Time: 01/01/24  7:25 PM    Specimen: Urine, Clean Catch   Result Value Ref Range    Sodium, Urine 53 mmol/L   Protein, Urine, Random - Urine, Clean Catch    Collection Time: 01/01/24  7:25 PM    Specimen: Urine, Clean Catch   Result Value Ref Range    Total Protein, Urine 22.8 mg/dL   Creatinine Urine Random (kidney function) GFR component - Urine, Clean Catch    Collection Time: 01/01/24  7:25 PM    Specimen: Urine, Clean Catch   Result Value Ref Range    Creatinine, Urine 96.4 mg/dL   Chloride, Urine, Random - Urine, Clean Catch    Collection Time: 01/01/24  7:25 PM    Specimen: Urine, Clean Catch   Result Value Ref Range    Chloride, Urine <20 mmol/L   CBC (No Diff)    Collection Time: 01/02/24  3:25 AM    Specimen: Blood   Result Value Ref Range    WBC 5.96 3.40 - 10.80 10*3/mm3    RBC 3.06 (L) 4.14 - 5.80 10*6/mm3    Hemoglobin 10.4 (L) 13.0 - 17.7 g/dL    Hematocrit 30.0 (L) 37.5 - 51.0 %    MCV 98.0 (H) 79.0 - 97.0 fL    MCH 34.0 (H) 26.6 - 33.0 pg    MCHC 34.7 31.5 - 35.7 g/dL    RDW 14.6 12.3 - 15.4 %    RDW-SD 52.2 37.0 - 54.0 fl    MPV 11.8 6.0 - 12.0 fL    Platelets 101 (L) 140 - 450 10*3/mm3   Protime-INR    Collection Time: 01/02/24  3:25 AM     Specimen: Blood   Result Value Ref Range    Protime 20.3 (H) 11.7 - 14.2 Seconds    INR 1.70 (H) 0.90 - 1.10   Uric Acid    Collection Time: 01/02/24  3:25 AM    Specimen: Blood   Result Value Ref Range    Uric Acid 6.4 3.4 - 7.0 mg/dL   Renal Function Panel    Collection Time: 01/02/24  3:25 AM    Specimen: Blood   Result Value Ref Range    Glucose 78 65 - 99 mg/dL    BUN 49 (H) 8 - 23 mg/dL    Creatinine 1.63 (H) 0.76 - 1.27 mg/dL    Sodium 138 136 - 145 mmol/L    Potassium 4.9 3.5 - 5.2 mmol/L    Chloride 108 (H) 98 - 107 mmol/L    CO2 22.0 22.0 - 29.0 mmol/L    Calcium 7.8 (L) 8.6 - 10.5 mg/dL    Albumin 2.4 (L) 3.5 - 5.2 g/dL    Phosphorus 3.3 2.5 - 4.5 mg/dL    Anion Gap 8.0 5.0 - 15.0 mmol/L    BUN/Creatinine Ratio 30.1 (H) 7.0 - 25.0    eGFR 40.0 (L) >60.0 mL/min/1.73       Radiology:  Pertinent radiology studies were reviewed as described above      Medications have been reviewed separately in chart overview      ASSESSMENT:  CKD, unknown stage but if his creatinine on admission and today's creatinine are near baseline then this is likely stage IIIb    Acute respiratory failure with hypoxia, improved after thoracentesis    Chest pain  Bladder lesion    Pleural effusion, possibly malignant    CKD (chronic kidney disease)    HTN (hypertension)    Shortness of breath    Lung mass, highly concerning for primary malignancy    Elevated troponin    Atrial fibrillation    Anemia    Thrombocytopenia           DISCUSSION/PLAN:   Chart reviewed.  Cardiac catheterization is now on hold pending workup of the lung lesions  Dyspnea improved after thoracentesis  Will hold IV fluids for now.  Will restart closer to cardiac catheterization, if and when this is rescheduled  Urine studies unremarkable  Renal ultrasound suggestive of chronic kidney disease.  Lesion seen on renal ultrasound could certainly represent a primary bladder cancer.  Will consult urology to see for possible cystoscopy  The left renal lesion is likely  a cyst and I would rather not do a CT with another dose of IV contrast if a heart cath is upcoming  Await old records to establish baseline labs    Continue to monitor electrolytes and volume closely       Nate Briggs MD  [facility]   Office phone number: [phone]  Answering service phone number: [phone]    01/02/24  12:18 EST    Dictation performed using Dragon dictation software

## 2024-01-02 NOTE — PROGRESS NOTES
"    DAILY PROGRESS NOTE  [facility]    Patient Identification:  Name: Derian Hendricks  Age: 89 y.o.  Sex: male  :  1934  MRN: [id number]         Primary Care Physician: Lan De Dios Jr.    Subjective:  Interval History: Patient is quite hard of hearing and he does not even seem to feel much improvement status post removal of 1400 cc of pleural fluid which is a bit surprising.  He is not voicing any new chest pain today.  He is resting in bed comfortably at this time and is in no current respiratory distress.  Daughter at bedside and case was discussed and there seem to be great discrepancy so case discussed with pulmonology as well as cardiology.  No nausea no vomiting no fever    Objective: Elderly and frail.  Pronounced hardness of hearing.  Questionable cognitive deficiencies    Scheduled Meds:allopurinol, 100 mg, Oral, Daily  aspirin, 81 mg, Oral, Daily  carvedilol, 12.5 mg, Oral, BID With Meals  senna-docusate sodium, 2 tablet, Oral, BID  sodium chloride, 10 mL, Intravenous, Q12H      Continuous Infusions:hold, 1 each  sodium chloride, 50 mL/hr, Last Rate: 50 mL/hr (24 0548)        Vital signs in last 24 hours:  Temp:  [97.6 °F (36.4 °C)-98.7 °F (37.1 °C)] 98.1 °F (36.7 °C)  Heart Rate:  [55-68] 67  Resp:  [16-18] 16  BP: (107-138)/(52-70) 119/62    Intake/Output:    Intake/Output Summary (Last 24 hours) at 2024 1052  Last data filed at 2024 0906  Gross per 24 hour   Intake 840 ml   Output 1400 ml   Net -560 ml       Exam:  /62 (BP Location: Right arm, Patient Position: Lying)   Pulse 67   Temp 98.1 °F (36.7 °C) (Oral)   Resp 16   Ht 175.3 cm (69\")   Wt 98.8 kg (217 lb 14.4 oz)   SpO2 95%   BMI 32.18 kg/m²     General Appearance:    Alert, cooperative, elderly and frail, nontoxic                         Throat:   Oral mucosa pink and moist                           Neck:   Supple, symmetrical, trachea midline, no JVD                         Lungs:    " Improved air entry to right base to auscultation bilaterally, respirations less labored                 Chest Wall:    No tenderness or deformity                          Heart:    Regular rate and rhythm, S1 and S2 normal                  Abdomen:     Soft, nontender, bowel sounds active                  Extremities: Generalized weakness though moving all, no cyanosis or edema                        Pulses:   Pulses palpable in all extremities                            Skin:   Skin is warm and dry, chronic changes                  Neurologic:   CNII-XII intact except for hardness of hearing  Data Review:  Labs in chart were reviewed.    Assessment:  Active Hospital Problems    Diagnosis  POA    **Acute respiratory failure with hypoxia [J96.01]  Unknown    Chest pain [R07.9]  Yes    Pleural effusion [J90]  Yes    CKD (chronic kidney disease) [N18.9]  Yes    HTN (hypertension) [I10]  Unknown    Shortness of breath [R06.02]  Yes    Lung mass [R91.8]  Unknown    Elevated troponin [R79.89]  Unknown    Atrial fibrillation [I48.91]  Unknown    Anemia [D64.9]  Unknown    Thrombocytopenia [D69.6]  Unknown      Resolved Hospital Problems   No resolved problems to display.       Plan:    Extensive discussion today with pulmonology  as well as  regarding further workup and overall plan    This is concerning for lung cancer until proven otherwise given unilateral pleural effusion as well as new nodules noted on CT   -1400 cc drained from thoracentesis -cytology will give us only 50% yield/need tissue   -Repeat stat CT of the chest today to see if any new lesions are notified and once that study is reviewed, CT-guided biopsy of lung lesion will be ordered.  INR 1.7 (no vitamin K was actually given)   -Cardiology holding on any additional workup such as cath and will allow the patient to eat normal diet today.  NSTEMI with acute systolic CHF/EF 30 to 35% with pulmonary hypertension and past history of  CAD   -Nephrology following given CKD and upcoming plans for additional contrast so that buys them in some time.  Creatinine improved 1.8-1.6.  Renal ultrasound per nephrology -defer to them if urological consult warranted at this time   -Eventual PET scan needed -if biopsies positive, oncological consult will be placed      ACD/TCP -10.1/97-10.4/101    DM2 with metformin on hold/SSI for now    SCDs for prophylaxis    Hardness of hearing noticed with advanced age -probable underlying aspects of cognitive decline not appreciated in home setting.  If biopsy positive, will further evaluate brain with MRI      Addendum -CT of the chest unfortunately did not reveal as much as I would like to see after fluid removal as this patient still has a tremendous amount of volume on that right lung.  I believe it is obscuring possible other additional pathology.  We are not treating this as pneumonic at this time.  Case discussed with  who is considering bronchoscopy/EBUS to maybe get a sample on hilar lymph nodes.  Unfortunately nothing really available to get with CT-guided biopsy at this time.  I have also discussed the case with  and ultimately from a cardiology perspective, there is no rush on having to proceed with heart cath at this time as medical management is most likely going to be the overall plan.  Cardiology has no objection to conscious sedation if bronchoscopy is pursued per pulmonary but we do want to try to avoid any general anesthetic at this time given his advanced age.  We are also further considering repeat thoracentesis for further fluid removal.  Now we also have a finding noted on renal ultrasound for possible bladder lesion and I deferred urology consultation to nephrology and they are placing the consult.  Regardless long-term prognosis for this patient is poor and will discuss more with patient and family with the feeling that I had after discussing things further with the family more  so than the patient as he is a little more confused today is that they would like a diagnostic answer even if they ultimately are not can to qualify for treatment.  I appreciate the input and assistance from all involved    Isiah Camacho MD  1/2/2024  10:52 EST

## 2024-01-02 NOTE — PLAN OF CARE
Problem: Adult Inpatient Plan of Care  Goal: Plan of Care Review  Outcome: Ongoing, Progressing  Flowsheets (Taken 1/2/2024 0523)  Progress: improving  Plan of Care Reviewed With: patient  Outcome Evaluation: vss. HR alternating between SB and SB with bigeminy. ivfs. NPO for possible cath today. US renal pending. family at bedside. resting well throughout shift.  Goal: Patient-Specific Goal (Individualized)  Outcome: Ongoing, Progressing  Goal: Absence of Hospital-Acquired Illness or Injury  Outcome: Ongoing, Progressing  Intervention: Identify and Manage Fall Risk  Recent Flowsheet Documentation  Taken 1/2/2024 0410 by Ana Hardy, RN  Safety Promotion/Fall Prevention: safety round/check completed  Taken 1/2/2024 0213 by Ana Hardy, RN  Safety Promotion/Fall Prevention: safety round/check completed  Taken 1/2/2024 0026 by Ana Hardy, RN  Safety Promotion/Fall Prevention: safety round/check completed  Taken 1/1/2024 2216 by Ana Hardy, RN  Safety Promotion/Fall Prevention: safety round/check completed  Taken 1/1/2024 2015 by Ana Hardy, RN  Safety Promotion/Fall Prevention: safety round/check completed  Intervention: Prevent Skin Injury  Recent Flowsheet Documentation  Taken 1/2/2024 0026 by Ana Hardy, RN  Body Position:   right   side-lying   position changed independently  Intervention: Prevent and Manage VTE (Venous Thromboembolism) Risk  Recent Flowsheet Documentation  Taken 1/1/2024 2015 by Ana Hardy, RN  VTE Prevention/Management:   bilateral   sequential compression devices on  Goal: Optimal Comfort and Wellbeing  Outcome: Ongoing, Progressing  Intervention: Provide Person-Centered Care  Recent Flowsheet Documentation  Taken 1/1/2024 2015 by Ana Hardy, RN  Trust Relationship/Rapport:   care explained   emotional support provided   empathic listening provided   choices provided  Goal: Readiness for Transition of Care  Outcome: Ongoing, Progressing      Problem: Chest Pain  Goal: Resolution of Chest Pain Symptoms  Outcome: Ongoing, Progressing     Problem: Breathing Pattern Ineffective  Goal: Effective Breathing Pattern  Outcome: Ongoing, Progressing     Problem: Fall Injury Risk  Goal: Absence of Fall and Fall-Related Injury  Outcome: Ongoing, Progressing  Intervention: Identify and Manage Contributors  Recent Flowsheet Documentation  Taken 1/1/2024 2015 by Ana Hardy, RN  Medication Review/Management: medications reviewed  Intervention: Promote Injury-Free Environment  Recent Flowsheet Documentation  Taken 1/2/2024 0410 by Ana Hardy, RN  Safety Promotion/Fall Prevention: safety round/check completed  Taken 1/2/2024 0213 by Ana Hardy, RN  Safety Promotion/Fall Prevention: safety round/check completed  Taken 1/2/2024 0026 by Ana Hardy, RN  Safety Promotion/Fall Prevention: safety round/check completed  Taken 1/1/2024 2216 by Ana Hardy, RN  Safety Promotion/Fall Prevention: safety round/check completed  Taken 1/1/2024 2015 by Ana Hardy, RN  Safety Promotion/Fall Prevention: safety round/check completed     Problem: Skin Injury Risk Increased  Goal: Skin Health and Integrity  Outcome: Ongoing, Progressing   Goal Outcome Evaluation:  Plan of Care Reviewed With: patient        Progress: improving  Outcome Evaluation: vss. HR alternating between SB and SB with bigeminy. ivfs. NPO for possible cath today. US renal pending. family at bedside. resting well throughout shift.

## 2024-01-02 NOTE — PROGRESS NOTES
"                                              LOS: 4 days   Patient Care Team:  Lan De Dios Jr. as PCP - General (Family Medicine)    Chief Complaint:  F/up lung mass ? and pleural effusion    Subjective   Interval History      On 3 L oxygen.  Reported improved breathing.  No cough.    REVIEW OF SYSTEMS:   CARDIOVASCULAR: No chest pain, chest pressure or chest discomfort. No palpitations or edema.   GASTROINTESTINAL: No anorexia, nausea, vomiting or diarrhea. No abdominal pain.  CONSTITUTIONAL: No fever or chills.     Ventilator/Non-Invasive Ventilation Settings (From admission, onward)      None                  Physical Exam:     Vital Signs  Temp:  [97.6 °F (36.4 °C)-98.7 °F (37.1 °C)] 98.1 °F (36.7 °C)  Heart Rate:  [55-68] 67  Resp:  [16-18] 16  BP: (107-138)/(52-70) 119/62    Intake/Output Summary (Last 24 hours) at 1/2/2024 1030  Last data filed at 1/2/2024 0906  Gross per 24 hour   Intake 840 ml   Output 1400 ml   Net -560 ml     Flowsheet Rows      Flowsheet Row First Filed Value   Admission Height 175.3 cm (69\") Documented at 12/29/2023 0314   Admission Weight 98.8 kg (217 lb 14.4 oz) Documented at 12/29/2023 0314            PPE used per hospital policy    General Appearance:   Alert, cooperative, in no acute distress   ENMT:  Mallampati score 3, moist mucous membrane   Eyes:  Pupils equal and reactive to light. EOMI   Neck:   Trachea midline. No thyromegaly.   Lungs:   Improved air entry on the right but still diminished with bilateral crackles..  No wheezing.  No use of accessory muscles.    Heart:   Regular rhythm and normal rate, normal S1 and S2, no         murmur   Skin:   No rash or ecchymosis   Abdomen:    Obese. Soft. No tenderness. No HSM.   Neuro/psych:  Conscious, alert, oriented x3. Strength 5/5 in upper and lower  ext.  Appropriate mood and affect   Extremities:  No cyanosis, clubbing or edema.  Warm extremities and well-perfused          Results Review:        Results from last 7 days "   Lab Units 01/02/24 0325 01/01/24 0415 12/31/23 0323   SODIUM mmol/L 138 138 138   POTASSIUM mmol/L 4.9 4.7 4.3   CHLORIDE mmol/L 108* 108* 105   CO2 mmol/L 22.0 22.0 23.7   BUN mg/dL 49* 48* 46*   CREATININE mg/dL 1.63* 1.83* 1.98*   GLUCOSE mg/dL 78 85 92   CALCIUM mg/dL 7.8* 8.0* 8.3*     Results from last 7 days   Lab Units 01/01/24 0415 12/29/23  0728 12/29/23  0536   CK TOTAL U/L 45  --   --    HSTROP T ng/L  --  508* 473*     Results from last 7 days   Lab Units 01/02/24 0325 01/01/24 0415 12/31/23 0323   WBC 10*3/mm3 5.96 6.00 5.90   HEMOGLOBIN g/dL 10.4* 10.4* 10.1*   HEMATOCRIT % 30.0* 30.3* 31.3*   PLATELETS 10*3/mm3 101* 97* 97*     Results from last 7 days   Lab Units 01/02/24 0325 01/01/24 0415 12/31/23  1005   INR  1.70* 2.16* 2.40*                               I reviewed the patient's new clinical results.        Medication Review:   allopurinol, 100 mg, Oral, Daily  aspirin, 81 mg, Oral, Daily  carvedilol, 12.5 mg, Oral, BID With Meals  senna-docusate sodium, 2 tablet, Oral, BID  sodium chloride, 10 mL, Intravenous, Q12H      Diagnostic imaging:  I personally and Independently reviewed and interpreted the following images:  CXR 12/31/2023: Moderate right pleural.  No mass.  CT chest 1/1/2024: Bilateral pleural effusion, larger on the right and trace on the left.  Bilateral lung masses.  Ultrasound chest 1/1/2023: Large Rt pleural effusion. No loculation.     Assessment     Bilateral pleural effusion: Trace on the left.  Large on the right.  S/p right Thora 1/1/2024 with removal of 1.4 L -> transudate indicative of CHF  Bilateral lung masses.  No clear etiology.  Could represent malignancy or benign round atelectasis.  No prior CT to compare.  Borderline mediastinal adenopathies specifically station 4s  Acute systolic CHF, EF 30-35%.  Pulmonary HTN, RVSP 45 on echo 12/29 with moderately dilated LA consistent with group 2.  Non-STEMI type I  Anticoagulation with Coumadin.  No clear  indication  History of CAD  PIPER  Anemia and thrombocytopenia, unknown chronicity.          Plan       Cytology on pleural fluid is pending.  If not diagnostic then would probably require tissue biopsy.  He has 2 lung masses at least which again are of unclear etiology.  The left upper lobe is accessible by CT guidance but carries risk of pneumothorax.  The right lung mass is probably riskier to biopsy at least based on his recent CT but we will go ahead and repeat the CT again to reevaluate.  Again this could represent benign rounded atelectasis specially if the patient has had pleural effusion for a while causing compressive atelectasis.  Also technically, he would have to have bronchoscopy with TBNA of the mediastinal adenopathies which also carry risks of complications including ischemic events when he is sedated for the procedure.  Furthermore, if this turns out to be malignancy I am afraid that patient would not really be a candidate for any kind of treatment (with the exception of perhaps radiation) due to his underlying comorbidities including CAD and CHF and also due to the extent of malignancy (probably stage IV based on imaging alone: Bilateral large masses and mediastinal adenopathies).  I did talk to his daughter at bedside and we discussed whether patient/family would be agreeable with treatment for malignancy if this turns out to be cancer and she could not really answer now.  She is going to discuss with the rest of the family and get back with us.    Check daily INR.    Oxygen by NC and titrate to keep respiratory >90%  Continue holding Coumadin  Incentive spirometry    From pure pulmonary perspective, patient does not necessarily need to be hospitalized for the workup of lung mass/possible malignancy as this could take a while and could always be performed as outpatient.    Discussed with Dr. Camacho.    Julián Espinoza MD  01/02/24  10:30 EST          This note was dictated utilizing Dragon dictation

## 2024-01-03 ENCOUNTER — APPOINTMENT (OUTPATIENT)
Dept: ULTRASOUND IMAGING | Facility: HOSPITAL | Age: 89
End: 2024-01-03
Payer: MEDICARE

## 2024-01-03 ENCOUNTER — APPOINTMENT (OUTPATIENT)
Dept: CT IMAGING | Facility: HOSPITAL | Age: 89
End: 2024-01-03
Payer: MEDICARE

## 2024-01-03 LAB
ALBUMIN SERPL-MCNC: 2.4 G/DL (ref 3.5–5.2)
ALT SERPL W P-5'-P-CCNC: 13 U/L (ref 1–41)
ANION GAP SERPL CALCULATED.3IONS-SCNC: 8.2 MMOL/L (ref 5–15)
AST SERPL-CCNC: 17 U/L (ref 1–40)
BUN SERPL-MCNC: 54 MG/DL (ref 8–23)
BUN/CREAT SERPL: 33.3 (ref 7–25)
CALCIUM SPEC-SCNC: 8.2 MG/DL (ref 8.6–10.5)
CHLORIDE SERPL-SCNC: 109 MMOL/L (ref 98–107)
CHOLEST SERPL-MCNC: 79 MG/DL (ref 0–200)
CO2 SERPL-SCNC: 21.8 MMOL/L (ref 22–29)
CREAT SERPL-MCNC: 1.62 MG/DL (ref 0.76–1.27)
CYTO UR: NORMAL
DEPRECATED RDW RBC AUTO: 51.6 FL (ref 37–54)
EGFRCR SERPLBLD CKD-EPI 2021: 40.3 ML/MIN/1.73
ERYTHROCYTE [DISTWIDTH] IN BLOOD BY AUTOMATED COUNT: 14.5 % (ref 12.3–15.4)
GLUCOSE SERPL-MCNC: 107 MG/DL (ref 65–99)
HCT VFR BLD AUTO: 31.4 % (ref 37.5–51)
HDLC SERPL-MCNC: 31 MG/DL (ref 40–60)
HGB BLD-MCNC: 10.2 G/DL (ref 13–17.7)
INR PPP: 1.55 (ref 0.9–1.1)
LAB AP CASE REPORT: NORMAL
LDLC SERPL CALC-MCNC: 34 MG/DL (ref 0–100)
LDLC/HDLC SERPL: 1.19 {RATIO}
MCH RBC QN AUTO: 31.9 PG (ref 26.6–33)
MCHC RBC AUTO-ENTMCNC: 32.5 G/DL (ref 31.5–35.7)
MCV RBC AUTO: 98.1 FL (ref 79–97)
PATH REPORT.FINAL DX SPEC: NORMAL
PATH REPORT.GROSS SPEC: NORMAL
PHOSPHATE SERPL-MCNC: 2.9 MG/DL (ref 2.5–4.5)
PLATELET # BLD AUTO: 114 10*3/MM3 (ref 140–450)
PMV BLD AUTO: 11.8 FL (ref 6–12)
POTASSIUM SERPL-SCNC: 5 MMOL/L (ref 3.5–5.2)
PROTHROMBIN TIME: 18.8 SECONDS (ref 11.7–14.2)
RBC # BLD AUTO: 3.2 10*6/MM3 (ref 4.14–5.8)
SODIUM SERPL-SCNC: 139 MMOL/L (ref 136–145)
TRIGL SERPL-MCNC: 55 MG/DL (ref 0–150)
VLDLC SERPL-MCNC: 14 MG/DL (ref 5–40)
WBC NRBC COR # BLD AUTO: 6.49 10*3/MM3 (ref 3.4–10.8)

## 2024-01-03 PROCEDURE — 80069 RENAL FUNCTION PANEL: CPT | Performed by: INTERNAL MEDICINE

## 2024-01-03 PROCEDURE — 88112 CYTOPATH CELL ENHANCE TECH: CPT | Performed by: INTERNAL MEDICINE

## 2024-01-03 PROCEDURE — 0W993ZZ DRAINAGE OF RIGHT PLEURAL CAVITY, PERCUTANEOUS APPROACH: ICD-10-PCS | Performed by: RADIOLOGY

## 2024-01-03 PROCEDURE — 99232 SBSQ HOSP IP/OBS MODERATE 35: CPT | Performed by: NURSE PRACTITIONER

## 2024-01-03 PROCEDURE — 84460 ALANINE AMINO (ALT) (SGPT): CPT | Performed by: NURSE PRACTITIONER

## 2024-01-03 PROCEDURE — 25010000002 FUROSEMIDE PER 20 MG: Performed by: INTERNAL MEDICINE

## 2024-01-03 PROCEDURE — 74176 CT ABD & PELVIS W/O CONTRAST: CPT

## 2024-01-03 PROCEDURE — 97110 THERAPEUTIC EXERCISES: CPT

## 2024-01-03 PROCEDURE — 84450 TRANSFERASE (AST) (SGOT): CPT | Performed by: NURSE PRACTITIONER

## 2024-01-03 PROCEDURE — 88305 TISSUE EXAM BY PATHOLOGIST: CPT | Performed by: INTERNAL MEDICINE

## 2024-01-03 PROCEDURE — 76942 ECHO GUIDE FOR BIOPSY: CPT

## 2024-01-03 PROCEDURE — 85610 PROTHROMBIN TIME: CPT | Performed by: NURSE PRACTITIONER

## 2024-01-03 PROCEDURE — 85027 COMPLETE CBC AUTOMATED: CPT | Performed by: STUDENT IN AN ORGANIZED HEALTH CARE EDUCATION/TRAINING PROGRAM

## 2024-01-03 PROCEDURE — 80061 LIPID PANEL: CPT | Performed by: NURSE PRACTITIONER

## 2024-01-03 PROCEDURE — 25010000002 LIDOCAINE 1 % SOLUTION: Performed by: RADIOLOGY

## 2024-01-03 RX ORDER — FUROSEMIDE 10 MG/ML
40 INJECTION INTRAMUSCULAR; INTRAVENOUS EVERY 12 HOURS
Status: DISCONTINUED | OUTPATIENT
Start: 2024-01-03 | End: 2024-01-07 | Stop reason: HOSPADM

## 2024-01-03 RX ORDER — LIDOCAINE HYDROCHLORIDE 10 MG/ML
10 INJECTION, SOLUTION INFILTRATION; PERINEURAL ONCE
Status: COMPLETED | OUTPATIENT
Start: 2024-01-03 | End: 2024-01-03

## 2024-01-03 RX ADMIN — ALLOPURINOL 100 MG: 100 TABLET ORAL at 10:13

## 2024-01-03 RX ADMIN — FUROSEMIDE 40 MG: 10 INJECTION, SOLUTION INTRAMUSCULAR; INTRAVENOUS at 21:53

## 2024-01-03 RX ADMIN — CARVEDILOL 12.5 MG: 12.5 TABLET, FILM COATED ORAL at 10:13

## 2024-01-03 RX ADMIN — Medication 10 ML: at 21:53

## 2024-01-03 RX ADMIN — FUROSEMIDE 40 MG: 10 INJECTION, SOLUTION INTRAMUSCULAR; INTRAVENOUS at 10:14

## 2024-01-03 RX ADMIN — CARVEDILOL 12.5 MG: 12.5 TABLET, FILM COATED ORAL at 17:40

## 2024-01-03 RX ADMIN — Medication 10 ML: at 10:14

## 2024-01-03 RX ADMIN — ASPIRIN 81 MG: 81 TABLET, COATED ORAL at 10:13

## 2024-01-03 RX ADMIN — LIDOCAINE HYDROCHLORIDE 6 ML: 10 INJECTION, SOLUTION INFILTRATION; PERINEURAL at 15:40

## 2024-01-03 NOTE — PLAN OF CARE
Goal Outcome Evaluation:  Plan of Care Reviewed With: patient, daughter           Outcome Evaluation: Pt is near baseline level of mobility, able to walk in yu 100 ft with SBA on 4L 02, o2 on 3L in room but raised to 4L for activity, ret to 3L after activity and when o2 sats >90%

## 2024-01-03 NOTE — PROGRESS NOTES
"   LOS: 5 days     Chief Complaint/ Reason for encounter: PIPER/CKD, abnormal renal ultrasound    Subjective   01/02/24 : He is doing about the same today, denies complaints  Shortness of breath improved after 1.4 L thoracentesis  Now with lung nodules concerning for possible lung cancer and possible malignant pleural effusion  No edema.  He was started on IV fluids in preparation for heart cath but that is now on hold and his diet has been restarted      Medical history reviewed:  History of Present Illness    Subjective    History taken from: Patient and chart    Vital Signs  Temp:  [97.9 °F (36.6 °C)-98.7 °F (37.1 °C)] 98.3 °F (36.8 °C)  Heart Rate:  [56-67] 67  Resp:  [16] 16  BP: (102-127)/(48-88) 117/88       Wt Readings from Last 1 Encounters:   12/29/23 0314 98.8 kg (217 lb 14.4 oz)       Objective:  Vital signs: (most recent): Blood pressure 123/64, pulse 64, temperature 97.7 °F (36.5 °C), temperature source Oral, resp. rate 16, height 175.3 cm (69\"), weight 98.8 kg (217 lb 14.4 oz), SpO2 92%.                Objective:  General Appearance:  Comfortable, well-appearing, in no acute distress and not in pain.  Awake, alert, oriented  HEENT: Mucous membranes moist, no injury, oropharynx clear  Lungs:  Normal effort and normal respiratory rate.  Breath sounds clear to auscultation.  No  respiratory distress.  No rales, decreased breath sounds or rhonchi.    Heart: Normal rate.  Regular rhythm.  S1, S2 normal.  No murmur.   Abdomen: Abdomen is soft.  Bowel sounds are normal, no abdominal tenderness.  There is no rebound or guarding  Extremities: Trace edema of bilateral lower extremities  Neurological: No focal motor or sensory deficits, pupils reactive  Skin:  Warm and dry.  No rash or cyanosis.       Results Review:    Intake/Output:     Intake/Output Summary (Last 24 hours) at 1/3/2024 0922  Last data filed at 1/3/2024 0824  Gross per 24 hour   Intake 720 ml   Output --   Net 720 ml         DATA:  Radiology and " Labs:  The following labs independently reviewed by me. Additional labs ordered for tomorrow a.m.  Interval notes, chart personally reviewed by me.   Old records independently reviewed showing unknown CKD.  Awaiting records  The following radiologic studies independently viewed by me, findings renal ultrasound was abnormal.  Kidneys normal size but with increased echogenicity and decreased cortical thickness compatible with CKD.  No hydronephrosis.  There was a lesion in the left kidney  New problems include echogenicity involving the bladder, probable renal cyst  Discussed with patient himself at bedside    Risk/ complexity of medical care/ medical decision making moderate complexity, abnormal renal imaging, possible new lung cancer, status post thoracentesis, eventual need for heart catheterization with IV contrast      Labs:   Recent Results (from the past 24 hour(s))   CBC (No Diff)    Collection Time: 01/03/24  3:27 AM    Specimen: Blood   Result Value Ref Range    WBC 6.49 3.40 - 10.80 10*3/mm3    RBC 3.20 (L) 4.14 - 5.80 10*6/mm3    Hemoglobin 10.2 (L) 13.0 - 17.7 g/dL    Hematocrit 31.4 (L) 37.5 - 51.0 %    MCV 98.1 (H) 79.0 - 97.0 fL    MCH 31.9 26.6 - 33.0 pg    MCHC 32.5 31.5 - 35.7 g/dL    RDW 14.5 12.3 - 15.4 %    RDW-SD 51.6 37.0 - 54.0 fl    MPV 11.8 6.0 - 12.0 fL    Platelets 114 (L) 140 - 450 10*3/mm3   Protime-INR    Collection Time: 01/03/24  3:27 AM    Specimen: Blood   Result Value Ref Range    Protime 18.8 (H) 11.7 - 14.2 Seconds    INR 1.55 (H) 0.90 - 1.10   Renal Function Panel    Collection Time: 01/03/24  3:27 AM    Specimen: Blood   Result Value Ref Range    Glucose 107 (H) 65 - 99 mg/dL    BUN 54 (H) 8 - 23 mg/dL    Creatinine 1.62 (H) 0.76 - 1.27 mg/dL    Sodium 139 136 - 145 mmol/L    Potassium 5.0 3.5 - 5.2 mmol/L    Chloride 109 (H) 98 - 107 mmol/L    CO2 21.8 (L) 22.0 - 29.0 mmol/L    Calcium 8.2 (L) 8.6 - 10.5 mg/dL    Albumin 2.4 (L) 3.5 - 5.2 g/dL    Phosphorus 2.9 2.5 - 4.5 mg/dL     Anion Gap 8.2 5.0 - 15.0 mmol/L    BUN/Creatinine Ratio 33.3 (H) 7.0 - 25.0    eGFR 40.3 (L) >60.0 mL/min/1.73       Radiology:  Pertinent radiology studies were reviewed as described above      Medications have been reviewed separately in chart overview      ASSESSMENT:  CKD, unknown stage but if his creatinine on admission and today's creatinine are near baseline then this is likely stage IIIb    Acute respiratory failure with hypoxia, improved after thoracentesis    Chest pain  Bladder lesion    Pleural effusion, possibly malignant    CKD (chronic kidney disease)    HTN (hypertension)    Shortness of breath    Lung mass, highly concerning for primary malignancy    Elevated troponin    Atrial fibrillation    Anemia    Thrombocytopenia           DISCUSSION/PLAN:   Renal function stable.   Chart reviewed.  Cardiac catheterization is now on hold pending workup of the lung lesions  Dyspnea improved after thoracentesis, but noted plans for thoracentesis today in the right  Will hold IV fluids for now.  Will restart closer to cardiac catheterization, if and when this is rescheduled  Given volume overload, will start diuresis today.  Urine studies unremarkable  Renal ultrasound suggestive of chronic kidney disease.  Urology following: CT scan without contrast and possible outpatient cystoscopy depending on CT results which are pending.   The left renal lesion is likely a cyst and I would rather not do a CT with another dose of IV contrast if a heart cath is upcoming  Await old records to establish baseline labs    Continue to monitor electrolytes and volume closely       Janice Shay MD  [facility]   Office phone number: [phone]  Answering service phone number: [phone]    01/03/24  09:22 EST

## 2024-01-03 NOTE — PLAN OF CARE
Problem: Adult Inpatient Plan of Care  Goal: Plan of Care Review  Outcome: Ongoing, Progressing  Flowsheets (Taken 1/3/2024 0513)  Progress: no change  Plan of Care Reviewed With: patient  Outcome Evaluation: vss. no c/o pain. on 4L O2 for comfort. thoracentesis planned for today. CT pelvis pending. family at bedside participating in care. resting well throughout shift.  Goal: Patient-Specific Goal (Individualized)  Outcome: Ongoing, Progressing  Goal: Absence of Hospital-Acquired Illness or Injury  Outcome: Ongoing, Progressing  Intervention: Identify and Manage Fall Risk  Recent Flowsheet Documentation  Taken 1/3/2024 0412 by Ana Hardy RN  Safety Promotion/Fall Prevention: safety round/check completed  Taken 1/3/2024 0224 by Ana Hardy RN  Safety Promotion/Fall Prevention: safety round/check completed  Taken 1/3/2024 0019 by Ana Hardy RN  Safety Promotion/Fall Prevention: safety round/check completed  Taken 1/2/2024 2209 by Ana Hardy RN  Safety Promotion/Fall Prevention: safety round/check completed  Taken 1/2/2024 1955 by Ana Hardy RN  Safety Promotion/Fall Prevention: safety round/check completed  Intervention: Prevent Skin Injury  Recent Flowsheet Documentation  Taken 1/3/2024 0224 by Ana Hardy RN  Body Position:   right   side-lying  Taken 1/3/2024 0019 by Ana Hardy, RN  Body Position: dangle, side of bed  Taken 1/2/2024 2209 by Ana Hardy RN  Body Position:   side-lying   right   position changed independently  Taken 1/2/2024 1955 by Ana Hardy RN  Body Position: supine  Goal: Optimal Comfort and Wellbeing  Outcome: Ongoing, Progressing  Goal: Readiness for Transition of Care  Outcome: Ongoing, Progressing     Problem: Chest Pain  Goal: Resolution of Chest Pain Symptoms  Outcome: Ongoing, Progressing     Problem: Breathing Pattern Ineffective  Goal: Effective Breathing Pattern  Outcome: Ongoing, Progressing     Problem: Fall Injury  Risk  Goal: Absence of Fall and Fall-Related Injury  Outcome: Ongoing, Progressing  Intervention: Identify and Manage Contributors  Recent Flowsheet Documentation  Taken 1/2/2024 1955 by Ana Hardy, RN  Medication Review/Management: medications reviewed  Intervention: Promote Injury-Free Environment  Recent Flowsheet Documentation  Taken 1/3/2024 0412 by Ana Hardy, RN  Safety Promotion/Fall Prevention: safety round/check completed  Taken 1/3/2024 0224 by Ana Hardy, RN  Safety Promotion/Fall Prevention: safety round/check completed  Taken 1/3/2024 0019 by Ana Hardy, RN  Safety Promotion/Fall Prevention: safety round/check completed  Taken 1/2/2024 2209 by Ana Hardy, RN  Safety Promotion/Fall Prevention: safety round/check completed  Taken 1/2/2024 1955 by Ana Hardy, RN  Safety Promotion/Fall Prevention: safety round/check completed     Problem: Skin Injury Risk Increased  Goal: Skin Health and Integrity  Outcome: Ongoing, Progressing   Goal Outcome Evaluation:  Plan of Care Reviewed With: patient        Progress: no change  Outcome Evaluation: vss. no c/o pain. on 4L O2 for comfort. thoracentesis planned for today. CT pelvis pending. family at bedside participating in care. resting well throughout shift.

## 2024-01-03 NOTE — PLAN OF CARE
Goal Outcome Evaluation:  Pt alert and orinted and follows commands. Pt is a S/P Rt thoracenthesis today, drained 2.1L of fluid. No c/o pain vitals stable. Pt is NPO after MN for PET scan in am sched. At 0730. Pt and family aware. No c/o at this time.Plan of care in progress

## 2024-01-03 NOTE — PROGRESS NOTES
"    DAILY PROGRESS NOTE  [facility]    Patient Identification:  Name: Derian Hendricks  Age: 89 y.o.  Sex: male  :  1934  MRN: [id number]         Primary Care Physician: Lan De Dios Jr.    Subjective:  Interval History: Patient clinically seems better given increased urine output with diuretics reinstated.  Seems a bit more alert today.  Not complaining of chest pain fever nausea or vomiting    Objective: Daughter at bedside and case discussed at length.  She seems well versed in regards to overall plan and she claims that she has not received final decision by family in regards to further workup versus focusing on comfort    Scheduled Meds:allopurinol, 100 mg, Oral, Daily  aspirin, 81 mg, Oral, Daily  carvedilol, 12.5 mg, Oral, BID With Meals  furosemide, 40 mg, Intravenous, Q12H  senna-docusate sodium, 2 tablet, Oral, BID  sodium chloride, 10 mL, Intravenous, Q12H      Continuous Infusions:hold, 1 each  hold, 1 each        Vital signs in last 24 hours:  Temp:  [98.3 °F (36.8 °C)-98.7 °F (37.1 °C)] 98.3 °F (36.8 °C)  Heart Rate:  [59-67] 67  Resp:  [16] 16  BP: (102-127)/(48-88) 117/88    Intake/Output:    Intake/Output Summary (Last 24 hours) at 1/3/2024 1128  Last data filed at 1/3/2024 0824  Gross per 24 hour   Intake 720 ml   Output --   Net 720 ml       Exam:  /88 (BP Location: Right arm, Patient Position: Lying)   Pulse 67   Temp 98.3 °F (36.8 °C) (Oral)   Resp 16   Ht 175.3 cm (69\")   Wt 98.8 kg (217 lb 14.4 oz)   SpO2 94%   BMI 32.18 kg/m²     General Appearance:    Alert, cooperative, elderly and very frail but clinically little better                         Throat:   Oral mucosa pink and moist                           Neck:   No JVD                         Lungs:    decreased right lung midlung down to auscultation bilaterally, respirations unlabored today                 Chest Wall:    No tenderness or deformity                          Heart:    Regular rate " and rhythm, S1 and S2 normal                  Abdomen:     Soft, nontender, bowel sounds active                 Extremities: Moving all, extensive chronic stasis changes, trace edema                      Data Review:  Labs in chart were reviewed.    Assessment:  Active Hospital Problems    Diagnosis  POA    **Acute respiratory failure with hypoxia [J96.01]  Unknown    Chest pain [R07.9]  Yes    Pleural effusion [J90]  Yes    CKD (chronic kidney disease) [N18.9]  Yes    HTN (hypertension) [I10]  Unknown    Shortness of breath [R06.02]  Yes    Lung mass [R91.8]  Unknown    Elevated troponin [R79.89]  Unknown    Atrial fibrillation [I48.91]  Unknown    Anemia [D64.9]  Unknown    Thrombocytopenia [D69.6]  Unknown      Resolved Hospital Problems   No resolved problems to display.       Plan:    Lots of discussion yesterday with  and  and I thoroughly appreciate everyone's input and assistance      Given new findings involving the bladder with questionable cancer as well as the fact that we have been chasing some lung nodules skewed by volume overload and pleural effusion and CT of the chest did not help clarify those nodules performed yesterday, regardless of what we find at this point in time, I do not think this patient is going to qualify for any type of treatment nor do I think the patient or family would be interested    Today a different daughter is at bedside and case was discussed and she is starting to make comments that she wants to focus on quality of life and to be honest I completely agree with that    Cardiology now placing cardiac cath on hold -suggest no resumption of Coumadin   -EF 30 to 35% with pulmonary hypertension and past history of CAD   -Cardiology feels likely remote event and medical management likely would be the medical plan going forward regardless    Pulmonology will offer Bronchoscopy and biopsy if patient and family want but we are proceeding with thoracentesis first and  to be honest I do not think the patient and family will likely want any type of bronchoscopy and biopsy after they further talked things over    An additional new finding on this admission concerning for bladder cancer with urology consulted and I asked to please hold any cystoscopy at this time until goals of care can be further quantified    Another additional finding is the fact that CT scan shows concerning aspects for cirrhosis with patient's abnormal LFTs and this is all likely influenced probably by advancing volume overload and cardiac heart failure    Renal managing fluid balance now with IVF stopped since we are not rushing towards the cath and diuretics reinstated    I would love to get a PET scan if able to on an inpatient basis        At this point in time, I am leaning towards the idea of this patient going home possibly tomorrow and possibly with hospice to help assist.  Patient and daughter at bedside are willing to listen to palliative care so consult was placed    Isiah Camacho MD  1/3/2024  11:28 EST

## 2024-01-03 NOTE — PROGRESS NOTES
"                                              LOS: 5 days   Patient Care Team:  Lan De Dios Jr. as PCP - General (Family Medicine)    Chief Complaint:  F/up lung mass ? and pleural effusion    Subjective   Interval History      On 3 L oxygen.  Denies dyspnea at rest.  No cough.    REVIEW OF SYSTEMS:   CARDIOVASCULAR: No chest pain, chest pressure or chest discomfort. No palpitations or edema.   GASTROINTESTINAL: No anorexia, nausea, vomiting or diarrhea. No abdominal pain.  CONSTITUTIONAL: No fever or chills.     Ventilator/Non-Invasive Ventilation Settings (From admission, onward)      None                  Physical Exam:     Vital Signs  Temp:  [97.9 °F (36.6 °C)-98.7 °F (37.1 °C)] 98.3 °F (36.8 °C)  Heart Rate:  [56-67] 67  Resp:  [16] 16  BP: (102-127)/(48-88) 117/88    Intake/Output Summary (Last 24 hours) at 1/3/2024 0803  Last data filed at 1/2/2024 1710  Gross per 24 hour   Intake 720 ml   Output --   Net 720 ml     Flowsheet Rows      Flowsheet Row First Filed Value   Admission Height 175.3 cm (69\") Documented at 12/29/2023 0314   Admission Weight 98.8 kg (217 lb 14.4 oz) Documented at 12/29/2023 0314            PPE used per hospital policy    General Appearance:   Alert, cooperative, in no acute distress   ENMT:  Mallampati score 3, moist mucous membrane   Eyes:  Pupils equal and reactive to light. EOMI   Neck:   Trachea midline. No thyromegaly.   Lungs:   diminished air entry on the right with bilateral crackles.  No wheezing.    Heart:   Regular rhythm and normal rate, normal S1 and S2, no         murmur   Skin:   No rash or ecchymosis.  Thickening of the skin in the lower extremities with dark discoloration.   Abdomen:    Obese. Soft. No tenderness. No HSM.   Neuro/psych:  Conscious, alert, oriented x3. Strength 5/5 in upper and lower  ext.  Appropriate mood and affect   Extremities:  No cyanosis, clubbing but edema.  Warm extremities and well-perfused          Results Review:        Results from " last 7 days   Lab Units 01/03/24 0327 01/02/24 0325 01/01/24 0415   SODIUM mmol/L 139 138 138   POTASSIUM mmol/L 5.0 4.9 4.7   CHLORIDE mmol/L 109* 108* 108*   CO2 mmol/L 21.8* 22.0 22.0   BUN mg/dL 54* 49* 48*   CREATININE mg/dL 1.62* 1.63* 1.83*   GLUCOSE mg/dL 107* 78 85   CALCIUM mg/dL 8.2* 7.8* 8.0*     Results from last 7 days   Lab Units 01/01/24 0415 12/29/23  0728 12/29/23  0536   CK TOTAL U/L 45  --   --    HSTROP T ng/L  --  508* 473*     Results from last 7 days   Lab Units 01/03/24 0327 01/02/24 0325 01/01/24 0415   WBC 10*3/mm3 6.49 5.96 6.00   HEMOGLOBIN g/dL 10.2* 10.4* 10.4*   HEMATOCRIT % 31.4* 30.0* 30.3*   PLATELETS 10*3/mm3 114* 101* 97*     Results from last 7 days   Lab Units 01/03/24 0327 01/02/24 0325 01/01/24 0415   INR  1.55* 1.70* 2.16*                               I reviewed the patient's new clinical results.        Medication Review:   allopurinol, 100 mg, Oral, Daily  aspirin, 81 mg, Oral, Daily  carvedilol, 12.5 mg, Oral, BID With Meals  senna-docusate sodium, 2 tablet, Oral, BID  sodium chloride, 10 mL, Intravenous, Q12H      Diagnostic imaging:  I personally and Independently reviewed and interpreted the following images:  CT chest 1/2/2024: Bilateral pleural effusion more prominent on the right.  Lung consolidation/masses/round atelectasis bilaterally, located in the dependent region of the lungs on the right and in the left upper lobe.    Assessment     Bilateral pleural effusion: Trace on the left.  Large on the right.  S/p right Thora 1/1/2024 with removal of 1.4 L -> transudate indicative of CHF/volume overload +/- cirrhosis  Mild pulmonary edema  Bilateral lung masses.  No clear etiology.  Could represent malignancy or benign round atelectasis.  No prior CT to compare.  Cirrhosis on CT images with ascites  Borderline mediastinal adenopathies specifically station 4s  Acute systolic CHF, EF 30-35%.  Pulmonary HTN, RVSP 45 on echo 12/29 with moderately dilated LA  consistent with group 2.  Non-STEMI type I  Anticoagulation with Coumadin.  No clear indication  History of CAD  PIPER  Anemia and thrombocytopenia, unknown chronicity.          Plan       Repeat Thora on the right today.  There is mild left pleural effusion which is not amenable to drainage.  Again the pleural fluid studies are consistent with transudative which is related to either CHF or cirrhosis.  Will get cytology again on the pleural fluid today.  The cytology from 1/1 is pending.  Check PET scan to assist in evaluation of possible malignancy and potential sites of biopsy.  Recommend diuresis.  He is volume overloaded with crackles on exam, pulmonary edema, bilateral pleural effusion (large on the right), ascites and has CHF and cirrhosis.  Again patient is symptomatic from the fluid overload and CHF and not from the 5 cm questionable lung mass VS atelectasis    Continue holding Coumadin  Oxygen by NC and titrate to keep respiratory >90%  Incentive spirometry    Discussed with patient and his nurse.  There was no family at bedside this morning.        Julián Espinoza MD  01/03/24  08:03 EST          This note was dictated utilizing Dragon dictation

## 2024-01-03 NOTE — PROGRESS NOTES
Name: Derian Hendricks ADMIT: 2023   : 1934  PCP: Lan De Dios Jr.    MRN: [id number] LOS: 6 days   AGE/SEX: 89 y.o. male  ROOM: [facility]     Subjective   Subjective   Patient seen this morning, daughter present at bedside.  Status post right-sided thoracentesis with 2.1 L of fluid removed, patient reported breathing improved afterwards per daughter.  Slept better overnight.  Denies shortness of breath, chest pain, fevers or chills this morning.    Review of Systems   As above  Objective   Objective   Vital Signs  Temp:  [97.5 °F (36.4 °C)-98.4 °F (36.9 °C)] 97.5 °F (36.4 °C)  Heart Rate:  [55-71] 57  Resp:  [17-26] 17  BP: (104-125)/(44-63) 109/54  SpO2:  [91 %-98 %] 97 %  on  Flow (L/min):  [3-4] 3;   Device (Oxygen Therapy): nasal cannula  Body mass index is 32.18 kg/m².  Physical Exam    General: Alert, lying in bed, not in distress, ill-appearing  HEENT: Normocephalic, atraumatic  CV: Regular rate and rhythm, no murmurs rubs or gallops  Lungs: Decreased bibasilar, possible crackles, nonlabored breathing, no wheezing,  Abdomen: Soft, nontender, nondistended  Extremities: Bilateral lower extremity edema,, no cyanosis     Results Review     I reviewed the patient's new clinical results.  Results from last 7 days   Lab Units 24  0503 24  0415   WBC 10*3/mm3 6.40 6.49 5.96 6.00   HEMOGLOBIN g/dL 10.2* 10.2* 10.4* 10.4*   PLATELETS 10*3/mm3 104* 114* 101* 97*     Results from last 7 days   Lab Units 24  0503 24  0327 24  0325 24  0415   SODIUM mmol/L 139 139 138 138   POTASSIUM mmol/L 4.7 5.0 4.9 4.7   CHLORIDE mmol/L 110* 109* 108* 108*   CO2 mmol/L 20.0* 21.8* 22.0 22.0   BUN mg/dL 50* 54* 49* 48*   CREATININE mg/dL 1.89* 1.62* 1.63* 1.83*   GLUCOSE mg/dL 100* 107* 78 85   Estimated Creatinine Clearance: 30.7 mL/min (A) (by C-G formula based on SCr of 1.89 mg/dL (H)).  Results from last 7 days   Lab Units 24  2792  "01/03/24  0327 01/02/24  0325   ALBUMIN g/dL 2.4* 2.4* 2.4*   AST (SGOT) U/L  --  17  --    ALT (SGPT) U/L  --  13  --      Results from last 7 days   Lab Units 01/04/24  0503 01/03/24  0327 01/02/24  0325 01/01/24  0415 12/30/23  1607 12/29/23  0536   CALCIUM mg/dL 7.8* 8.2* 7.8* 8.0*   < > 8.9   ALBUMIN g/dL 2.4* 2.4* 2.4*  --   --   --    MAGNESIUM mg/dL  --   --   --   --   --  2.2   PHOSPHORUS mg/dL 3.9 2.9 3.3  --   --   --     < > = values in this interval not displayed.     Results from last 7 days   Lab Units 12/29/23  0728   PROCALCITONIN ng/mL 0.15   No results found for: \"COVID19\"  Glucose   Date/Time Value Ref Range Status   01/04/2024 0801 100 70 - 130 mg/dL Final           US Thoracentesis  Narrative: ULTRASOUND-GUIDED RIGHT THORACENTESIS     HISTORY: Pleural effusion     After signed informed consent was obtained the patient was prepped and  draped in the usual sterile fashion with 2% chlorhexidine. Lidocaine was  used for local anesthesia.     Ultrasound guidance was used to place a 5 Qatari catheter into the right  pleural effusion. 2.1 L of pleural fluid was removed. Sample was sent to  the lab.     Confirmatory images were obtained.     Patient tolerated the procedure well with no complications.     Impression: Ultrasound-guided right thoracentesis as described              This report was finalized on 1/3/2024 4:11 PM by Dr. Nate Toney M.D  on Workstation: [id number]     CT Abdomen Pelvis Without Contrast  Narrative: ABDOMEN AND PELVIS CT WITHOUT CONTRAST     HISTORY: Evaluate bladder mass, sepsis.     TECHNIQUE: Noncontrast abdomen and pelvis CT correlated with chest CT  01/02/2024 and outside prior abdomen and pelvis CT 12/28/2023.     FINDINGS: Gynecomastia with large volume right pleural effusion and  small volume left pleural effusion again observed. Large volume of  ascites which was partially visualized on the chest CT is demonstrated  more fully on today's exam.     There is " cirrhotic morphology of the liver which is quite small in  volume. The spleen is enlarged measuring about 13 x 7.5 cm axial and  almost 18 cm oblique coronal dimension. Parenchyma of the pancreas and  the adrenals has a normal noncontrast CT appearance. Bulky collateral  vascularity in the upper abdomen around the lesser curve of the stomach.     Renal parenchyma is slightly thinned. There is no hydronephrosis.  Well-circumscribed fluid density 7 x 4.8 cm lesion at the posteromedial  left upper pole represents a cyst.     Slight increased density of the urine within the urinary bladder at 35  Hounsfield units. This is consistent with excreted contrast material  from the outside prior chest CT from 12/28/2023. No bladder mass is  evident on today's exam.     No abdominal or pelvic lymphadenopathy. Diverticulosis throughout the  colon. No abnormal-appearing bowel.     Chronic ankylosis across the sacroiliac joints. Degenerative change at  the hips. No compression deformity in the visualized lumbar or lower  thoracic spine.     Radiation dose reduction techniques were utilized, including automated  exposure control and exposure modulation based on body size.     Impression: Large volume of ascites with pronounced cirrhotic morphology  of the liver and splenomegaly as well as gynecomastia and bilateral  pleural effusions similar as on recent chest CT. There is some excreted  contrast material partially opacifying the urinary bladder. No bladder  lesion is evident. There is no hydronephrosis.     This report was finalized on 1/3/2024 11:13 AM by Dr. Gregg Gonsales M.D on Workstation: [id number]       Scheduled Medications  allopurinol, 100 mg, Oral, Daily  aspirin, 81 mg, Oral, Daily  carvedilol, 12.5 mg, Oral, BID With Meals  furosemide, 40 mg, Intravenous, Q12H  senna-docusate sodium, 2 tablet, Oral, BID  sodium chloride, 10 mL, Intravenous, Q12H    Infusions  hold, 1 each  hold, 1 each    Diet  Diet: Cardiac Diets;  Healthy Heart (2-3 Na+); Texture: Regular Texture (IDDSI 7); Fluid Consistency: Thin (IDDSI 0)    I have personally reviewed     [x]  Laboratory   [x]  Microbiology   [x]  Radiology   [x]  EKG/Telemetry  [x]  Cardiology/Vascular   []  Pathology    []  Records       Assessment/Plan     Active Hospital Problems    Diagnosis  POA    **Acute respiratory failure with hypoxia [J96.01]  Unknown    Chest pain [R07.9]  Yes    Pleural effusion [J90]  Yes    CKD (chronic kidney disease) [N18.9]  Yes    HTN (hypertension) [I10]  Unknown    Shortness of breath [R06.02]  Yes    Lung mass [R91.8]  Unknown    Elevated troponin [R79.89]  Unknown    Atrial fibrillation [I48.91]  Unknown    Anemia [D64.9]  Unknown    Thrombocytopenia [D69.6]  Unknown      Resolved Hospital Problems   No resolved problems to display.       Mr. Hendricks is a 89 y.o. male with a history of CHF, CAD, diabetes, CKD, GERD, and HTN was transferred to [facility] from  [facility] for further evaluation and management of chest pain and shortness of breath..    Bilateral pleural effusion:  Large on the right.  S/p right sided thoracentesis status 01/1/2024 1.4 of fluid was removed, fluid studies consistent with transudate.  Cytology was negative for malignant cells.  Repeat thoracentesis performed on the right side on 01/04/2023 on 2.1 L of pleural fluid was removed, repeat cytology following.       Bilateral lung masses.  No clear etiology.  Could represent malignancy or benign round atelectasis.CT chest 01/02/2024-right and unchanged small left pleural effusions. Right lower lobe remains mostly collapsed. Near identical areas of consolidation the bilateral upper lobes. Both pneumonitis/pneumonia (favored given bilaterality) and malignancy remain in the differential.    Initial cytology was negative for left pleural effusion, status post right thoracentesis with repeat cytology pending.      Acute systolic heart failure/NSTEMI- suspect type 1  given per cardiology.  Echo 12/29/2023 showed EF of 31-35,  hypokinetic mid anterior, mid inferolateral and mid inferoseptal walls. The following left ventricular wall segments are akinetic, apical anterior, apical lateral, apical inferior, apical septal and apex. -on asa and Coreg. Not on  plavix now due to need for thoracentesis and biopsy of lung. On IV lasix.  Cardiology following.    Hypertension: BP acutely stable, monitor      PIPER on CKD-Stage 3? Cr stable around 1.89, Cr was on 12/29/2023 1.5 on admission.  no prior creatine available.  Nephrology following.  On IV Lasix.  Monitor daily BMP.      On warfarin at home- etiology unclear, currently on hold.  Dicussed with Daughter, reports he has been on it for years since the heart attack in 1989 and nobody took him off of it, but no history of blood clot, pulmonary embolism, irregular heartbeat.  Resume if no procedures planned.                  SCDs for DVT prophylaxis.  Full code.  Discussed with patient.  Anticipate discharge to be determined      Copied text in this note has been reviewed and is accurate as of 01/04/24.         Dictated utilizing Dragon dictation        Leyla Espinal MD  [facility]  01/04/24  12:03 EST

## 2024-01-03 NOTE — THERAPY TREATMENT NOTE
Patient Name: Derian Hendricks  : 1934    MRN: [id number]                              Today's Date: 1/3/2024       Admit Date: 2023    Visit Dx:     ICD-10-CM ICD-9-CM   1. Follow-up exam  Z09 V67.9     Patient Active Problem List   Diagnosis    Chest pain    Pleural effusion    CKD (chronic kidney disease)    HTN (hypertension)    Shortness of breath    Acute respiratory failure with hypoxia    Lung mass    Elevated troponin    Atrial fibrillation    Anemia    Thrombocytopenia     Past Medical History:   Diagnosis Date    Atrial fibrillation     CHF (congestive heart failure)     Coronary artery disease     Diabetes mellitus     GERD (gastroesophageal reflux disease)     Gout     Hypertension      Past Surgical History:   Procedure Laterality Date    CHOLECYSTECTOMY      CORONARY ANGIOPLASTY        General Information       Row Name 24 1204          Physical Therapy Time and Intention    Document Type therapy note (daily note)  -PC     Mode of Treatment physical therapy  -PC       Row Name 24 1204          General Information    Existing Precautions/Restrictions oxygen therapy device and L/min  -PC               User Key  (r) = Recorded By, (t) = Taken By, (c) = Cosigned By      Initials Name Provider Type    PC Aissatou Ann PT Physical Therapist                   Mobility       Row Name 24 1204          Bed Mobility    Comment, (Bed Mobility) sitting edge of bed  -PC       Row Name 24 1204          Sit-Stand Transfer    Sit-Stand [place] (Transfers) supervision  -PC       Row Name 24 1204          Gait/Stairs (Locomotion)    [place] Level (Gait) standby assist  -PC     Distance in Feet (Gait) 100 ft on 4L o2  -PC     Deviations/Abnormal Patterns (Gait) gait speed decreased;stride length decreased;base of support, wide;festinating/shuffling  -PC               User Key  (r) = Recorded By, (t) = Taken By, (c) = Cosigned By      Initials Name Provider Type     Aissatou Arenas, PT Physical Therapist                   Obj/Interventions       Row Name 01/03/24 1206          Motor Skills    Therapeutic Exercise --  seated AP, LAQ 10 reps  -PC               User Key  (r) = Recorded By, (t) = Taken By, (c) = Cosigned By      Initials Name Provider Type    Aissatou Arenas, PT Physical Therapist                   Goals/Plan    No documentation.                  Clinical Impression       Row Name 01/03/24 1206          Pain    Pretreatment Pain Rating 0/10 - no pain  -PC       Row Name 01/03/24 1206          Plan of Care Review    Plan of Care Reviewed With patient;daughter  -PC     Outcome Evaluation Pt is near baseline level of mobility, able to walk in yu 100 ft with SBA on 4L 02, o2 on 3L in room but raised to 4L for activity, ret to 3L after activity and when o2 sats >90%  -PC       Row Name 01/03/24 1206          Vital Signs    Pre SpO2 (%) 93  -PC     O2 Delivery Pre Treatment supplemental O2  -PC     Intra SpO2 (%) 84  -PC     O2 Delivery Intra Treatment supplemental O2  -PC     Post SpO2 (%) 93  -PC     O2 Delivery Post Treatment supplemental O2  -PC       Row Name 01/03/24 1206          Positioning and Restraints    Pre-Treatment Position in bed  -PC     Post Treatment Position bed  -PC     In Bed sitting EOB;call light within reach;encouraged to call for assist;with family/caregiver  -PC               User Key  (r) = Recorded By, (t) = Taken By, (c) = Cosigned By      Initials Name Provider Type    Aissatou Arenas, PT Physical Therapist                   Outcome Measures       Row Name 01/03/24 1208 01/03/24 0840       How much help from another person do you currently need...    Turning from your back to your side while in flat bed without using bedrails? 3  -PC 3  -JS    Moving from lying on back to sitting on the side of a flat bed without bedrails? 4  -PC 3  -JS    Moving to and from a bed to a chair (including a wheelchair)? 4  -PC 2  -JS    Standing  up from a chair using your arms (e.g., wheelchair, bedside chair)? 4  -PC 2  -JS    Climbing 3-5 steps with a railing? 3  -PC 2  -JS    To walk in hospital room? 3  -PC 2  -JS    AM-PAC 6 Clicks Score (PT) 21  -PC 14  -JS    Highest Level of Mobility Goal 6 --> Walk 10 steps or more  -PC 4 --> Transfer to chair/commode  -JS              User Key  (r) = Recorded By, (t) = Taken By, (c) = Cosigned By      Initials Name Provider Type    PC Aissatou Ann, PT Physical Therapist    Karlene Sawyer, RN Registered Nurse                                 Physical Therapy Education       Title: PT OT SLP Therapies (Done)       Topic: Physical Therapy (Done)       Point: Mobility training (Done)       Learning Progress Summary             Patient Acceptance, E,D, DU by PC at 1/3/2024 1208    Acceptance, E,D, DU by PC at 1/2/2024 1457    Acceptance, E, VU by ER at 12/31/2023 1038                         Point: Home exercise program (Done)       Learning Progress Summary             Patient Acceptance, E,D, DU by PC at 1/3/2024 1208    Acceptance, E, VU by ER at 12/31/2023 1038                         Point: Body mechanics (Done)       Learning Progress Summary             Patient Acceptance, E,D, DU by PC at 1/3/2024 1208    Acceptance, E, VU by ER at 12/31/2023 1038                         Point: Precautions (Done)       Learning Progress Summary             Patient Acceptance, E,D, DU by  at 1/3/2024 1208    Acceptance, E, VU by ER at 12/31/2023 1038    Acceptance, E, VU by MG at 12/29/2023 1527                                         User Key       Initials Effective Dates Name Provider Type Discipline     06/16/21 -  Aissatou Ann, PT Physical Therapist PT    MG 05/24/22 -  Ignacia Crisostomo, PT Physical Therapist PT    ER 10/15/23 -  Chelly Brooks, PT Physical Therapist PT                  PT Recommendation and Plan     Plan of Care Reviewed With: patient, daughter  Outcome Evaluation: Pt is near baseline level of  mobility, able to walk in yu 100 ft with SBA on 4L 02, o2 on 3L in room but raised to 4L for activity, ret to 3L after activity and when o2 sats >90%     Time Calculation:         PT Charges       Row Name 01/03/24 1209             Time Calculation    Start Time 1115  -PC      Stop Time 1126  -PC      Time Calculation (min) 11 min  -PC      PT Received On 01/03/24  -PC      PT - Next Appointment 01/04/24  -PC                User Key  (r) = Recorded By, (t) = Taken By, (c) = Cosigned By      Initials Name Provider Type    PC Aissatou Ann, PT Physical Therapist                  Therapy Charges for Today       Code Description Service Date Service Provider Modifiers Qty    67247845890 HC PT THER PROC EA 15 MIN 1/2/2024 Aissatou Ann, PT GP 1    63257567897 HC PT THER PROC EA 15 MIN 1/3/2024 Aissatou Ann, PT GP 1            PT G-Codes  AM-PAC 6 Clicks Score (PT): 21       Aissatou Ann PT  1/3/2024

## 2024-01-03 NOTE — PROGRESS NOTES
Hospital Follow Up    LOS:  LOS: 5 days   Patient Name: Derian Hendricks  Age/Sex: 89 y.o. male  : 1934  MRN: [id number]    Day of Service: 24   Length of Stay: 5  Encounter Provider: VIBHA Villar  Place of Service: [facility] CARDIOLOGY  Patient Care Team:  Lan De Dios Jr. as PCP - General (Family Medicine)    Subjective:     Chief Complaint: NSTEMI, CHF, CM, Lung mass    Interval History: short of breath, no chest pain. Sinus clarence with PVCs    Objective:     Objective:  Temp:  [97.9 °F (36.6 °C)-98.7 °F (37.1 °C)] 98.3 °F (36.8 °C)  Heart Rate:  [56-67] 67  Resp:  [16] 16  BP: (102-127)/(48-88) 117/88     Intake/Output Summary (Last 24 hours) at 1/3/2024 1021  Last data filed at 1/3/2024 0824  Gross per 24 hour   Intake 720 ml   Output --   Net 720 ml     Body mass index is 32.18 kg/m².      23  0314   Weight: 98.8 kg (217 lb 14.4 oz)     Weight change:     Physical Exam:   General Appearance:    Awake alert and oriented in no acute distress.   Color:  Skin:  Neuro:  HEENT:    Lungs:     Pink  Warm and dry  No focal, motor or sensory deficits  Neck supple, pupils equal, round and reactive. No JVD, No Bruit  Scattered rales to auscultation,tachypneic and labored    Heart:    Regular rate and rhythm, S1 and S2, no murmur, no gallop, no rub. 1-2+ edema, DP/PT pulses are 2+   Chest Wall:    No abnormalities observed   Abdomen:     Normal bowel sounds, no masses, no organomegaly, soft        non-tender, non-distended, no guarding, no ascites noted   Extremities:   Moves all extremities well, no cyanosis, no redness       Lab Review:   Results from last 7 days   Lab Units 24  0327 24  0325   SODIUM mmol/L 139 138   POTASSIUM mmol/L 5.0 4.9   CHLORIDE mmol/L 109* 108*   CO2 mmol/L 21.8* 22.0   BUN mg/dL 54* 49*   CREATININE mg/dL 1.62* 1.63*   GLUCOSE mg/dL 107* 78   CALCIUM mg/dL 8.2* 7.8*     Results from last 7 days   Lab Units  "01/01/24  0415 12/29/23  0728 12/29/23  0536   CK TOTAL U/L 45  --   --    HSTROP T ng/L  --  508* 473*     Results from last 7 days   Lab Units 01/03/24  0327 01/02/24  0325   WBC 10*3/mm3 6.49 5.96   HEMOGLOBIN g/dL 10.2* 10.4*   HEMATOCRIT % 31.4* 30.0*   PLATELETS 10*3/mm3 114* 101*     Results from last 7 days   Lab Units 01/03/24  0327 01/02/24  0325   INR  1.55* 1.70*     Results from last 7 days   Lab Units 12/29/23  0536   MAGNESIUM mg/dL 2.2           Invalid input(s): \"LDLCALC\"          I reviewed the patient's new clinical results.  I personally viewed and interpreted the patient's EKG  Current Medications:   Scheduled Meds:allopurinol, 100 mg, Oral, Daily  aspirin, 81 mg, Oral, Daily  carvedilol, 12.5 mg, Oral, BID With Meals  furosemide, 40 mg, Intravenous, Q12H  senna-docusate sodium, 2 tablet, Oral, BID  sodium chloride, 10 mL, Intravenous, Q12H      Continuous Infusions:hold, 1 each  hold, 1 each        Allergies:  No Known Allergies    Assessment:       NSTEMI- suspect type 1 given echocardiogram findings. On asa and Coreg. No plavix now due to need for thoracentesis and biopsy of lung. Consider adding statin. Check Lft's first given hepatic congestions. Plan for cath once lung testing completed  LBBB- chronic  Possible lung mass- Plan for cytology of fluid today and repeat thoracentesis. Yesterday 1.4L removed  On warfarin at home- etiology unclear. Will get notes from PCP   Cardiomyopathy- EF 30-35%. Likely ischemic- no ACEI/ARB due to renal function  Acute Systolic CHF- orthopnea today and required increase O2 requirements. Was started on IV lasix today. Renal following  PIPER on CKD- baseline unknown. Renal stopped fluids  Plan:           VIBHA Villar  01/03/24  10:21 EST  Electronically signed by VIBHA Villar, 01/03/24, 10:21 AM EST.     "

## 2024-01-04 ENCOUNTER — APPOINTMENT (OUTPATIENT)
Dept: PET IMAGING | Facility: HOSPITAL | Age: 89
End: 2024-01-04
Payer: MEDICARE

## 2024-01-04 LAB
ALBUMIN SERPL-MCNC: 2.4 G/DL (ref 3.5–5.2)
ANION GAP SERPL CALCULATED.3IONS-SCNC: 9 MMOL/L (ref 5–15)
BUN SERPL-MCNC: 50 MG/DL (ref 8–23)
BUN/CREAT SERPL: 26.5 (ref 7–25)
CALCIUM SPEC-SCNC: 7.8 MG/DL (ref 8.6–10.5)
CHLORIDE SERPL-SCNC: 110 MMOL/L (ref 98–107)
CO2 SERPL-SCNC: 20 MMOL/L (ref 22–29)
CREAT SERPL-MCNC: 1.89 MG/DL (ref 0.76–1.27)
DEPRECATED RDW RBC AUTO: 52 FL (ref 37–54)
EGFRCR SERPLBLD CKD-EPI 2021: 33.5 ML/MIN/1.73
ERYTHROCYTE [DISTWIDTH] IN BLOOD BY AUTOMATED COUNT: 14.3 % (ref 12.3–15.4)
FERRITIN SERPL-MCNC: 216 NG/ML (ref 30–400)
GLUCOSE BLDC GLUCOMTR-MCNC: 100 MG/DL (ref 70–130)
GLUCOSE SERPL-MCNC: 100 MG/DL (ref 65–99)
HCT VFR BLD AUTO: 29.8 % (ref 37.5–51)
HGB BLD-MCNC: 10.2 G/DL (ref 13–17.7)
IRON 24H UR-MRATE: 48 MCG/DL (ref 59–158)
IRON SATN MFR SERPL: 20 % (ref 20–50)
MCH RBC QN AUTO: 33.8 PG (ref 26.6–33)
MCHC RBC AUTO-ENTMCNC: 34.2 G/DL (ref 31.5–35.7)
MCV RBC AUTO: 98.7 FL (ref 79–97)
PHOSPHATE SERPL-MCNC: 3.9 MG/DL (ref 2.5–4.5)
PLATELET # BLD AUTO: 104 10*3/MM3 (ref 140–450)
PMV BLD AUTO: 11.4 FL (ref 6–12)
POTASSIUM SERPL-SCNC: 4.7 MMOL/L (ref 3.5–5.2)
PSA SERPL-MCNC: 0.59 NG/ML (ref 0–4)
RBC # BLD AUTO: 3.02 10*6/MM3 (ref 4.14–5.8)
SODIUM SERPL-SCNC: 139 MMOL/L (ref 136–145)
TIBC SERPL-MCNC: 240 MCG/DL (ref 298–536)
TRANSFERRIN SERPL-MCNC: 161 MG/DL (ref 200–360)
WBC NRBC COR # BLD AUTO: 6.4 10*3/MM3 (ref 3.4–10.8)

## 2024-01-04 PROCEDURE — 85027 COMPLETE CBC AUTOMATED: CPT | Performed by: STUDENT IN AN ORGANIZED HEALTH CARE EDUCATION/TRAINING PROGRAM

## 2024-01-04 PROCEDURE — 99497 ADVNCD CARE PLAN 30 MIN: CPT | Performed by: NURSE PRACTITIONER

## 2024-01-04 PROCEDURE — 25010000002 CEFTRIAXONE PER 250 MG: Performed by: INTERNAL MEDICINE

## 2024-01-04 PROCEDURE — 83540 ASSAY OF IRON: CPT | Performed by: STUDENT IN AN ORGANIZED HEALTH CARE EDUCATION/TRAINING PROGRAM

## 2024-01-04 PROCEDURE — 82728 ASSAY OF FERRITIN: CPT | Performed by: STUDENT IN AN ORGANIZED HEALTH CARE EDUCATION/TRAINING PROGRAM

## 2024-01-04 PROCEDURE — 25010000002 FUROSEMIDE PER 20 MG: Performed by: INTERNAL MEDICINE

## 2024-01-04 PROCEDURE — A9552 F18 FDG: HCPCS | Performed by: STUDENT IN AN ORGANIZED HEALTH CARE EDUCATION/TRAINING PROGRAM

## 2024-01-04 PROCEDURE — 84153 ASSAY OF PSA TOTAL: CPT | Performed by: INTERNAL MEDICINE

## 2024-01-04 PROCEDURE — 99221 1ST HOSP IP/OBS SF/LOW 40: CPT | Performed by: NURSE PRACTITIONER

## 2024-01-04 PROCEDURE — 84466 ASSAY OF TRANSFERRIN: CPT | Performed by: STUDENT IN AN ORGANIZED HEALTH CARE EDUCATION/TRAINING PROGRAM

## 2024-01-04 PROCEDURE — 78815 PET IMAGE W/CT SKULL-THIGH: CPT

## 2024-01-04 PROCEDURE — 82948 REAGENT STRIP/BLOOD GLUCOSE: CPT

## 2024-01-04 PROCEDURE — 80069 RENAL FUNCTION PANEL: CPT | Performed by: INTERNAL MEDICINE

## 2024-01-04 PROCEDURE — 99232 SBSQ HOSP IP/OBS MODERATE 35: CPT | Performed by: NURSE PRACTITIONER

## 2024-01-04 PROCEDURE — 0 FLUDEOXYGLUCOSE F18 SOLUTION: Performed by: STUDENT IN AN ORGANIZED HEALTH CARE EDUCATION/TRAINING PROGRAM

## 2024-01-04 RX ADMIN — CEFTRIAXONE SODIUM 1000 MG: 1 INJECTION, POWDER, FOR SOLUTION INTRAMUSCULAR; INTRAVENOUS at 21:20

## 2024-01-04 RX ADMIN — FUROSEMIDE 40 MG: 10 INJECTION, SOLUTION INTRAMUSCULAR; INTRAVENOUS at 21:20

## 2024-01-04 RX ADMIN — Medication 10 ML: at 21:20

## 2024-01-04 RX ADMIN — CARVEDILOL 12.5 MG: 12.5 TABLET, FILM COATED ORAL at 10:20

## 2024-01-04 RX ADMIN — FLUDEOXYGLUCOSE F 18 1 DOSE: 200 INJECTION, SOLUTION INTRAVENOUS at 08:00

## 2024-01-04 RX ADMIN — Medication 10 ML: at 10:22

## 2024-01-04 RX ADMIN — ASPIRIN 81 MG: 81 TABLET, COATED ORAL at 10:20

## 2024-01-04 RX ADMIN — FUROSEMIDE 40 MG: 10 INJECTION, SOLUTION INTRAMUSCULAR; INTRAVENOUS at 10:20

## 2024-01-04 RX ADMIN — CARVEDILOL 12.5 MG: 12.5 TABLET, FILM COATED ORAL at 16:47

## 2024-01-04 RX ADMIN — ALLOPURINOL 100 MG: 100 TABLET ORAL at 10:20

## 2024-01-04 NOTE — PROGRESS NOTES
Hospital Follow Up    LOS:  LOS: 6 days   Patient Name: Derian Hendricks  Age/Sex: 89 y.o. male  : 1934  MRN: [id number]    Day of Service: 24   Length of Stay: 6  Encounter Provider: VIBHA Villar  Place of Service: [facility] CARDIOLOGY  Patient Care Team:  Lan De Dios Jr. as PCP - General (Family Medicine)    Subjective:     Chief Complaint: NSTEMI,CM,CHF,lungmass    Interval History: breathing better today    Objective:     Objective:  Temp:  [97.5 °F (36.4 °C)-98.4 °F (36.9 °C)] 97.5 °F (36.4 °C)  Heart Rate:  [55-71] 57  Resp:  [17-26] 17  BP: (104-125)/(44-63) 109/54     Intake/Output Summary (Last 24 hours) at 2024 1251  Last data filed at 2024 1228  Gross per 24 hour   Intake 240 ml   Output 3400 ml   Net -3160 ml     Body mass index is 32.18 kg/m².      23  0314   Weight: 98.8 kg (217 lb 14.4 oz)     Weight change:     Physical Exam:   General Appearance:    Awake alert and oriented in no acute distress.   Color:  Skin:  Neuro:  HEENT:    Lungs:     Pink  Warm and dry  No focal, motor or sensory deficits  Neck supple, pupils equal, round and reactive. No JVD, No Bruit  Improved air entry decreased on the right to auscultation,respirations regular, even and                  unlabored    Heart:    Regular rate and rhythm, S1 and S2, no murmur, no gallop, no rub. No edema, DP/PT pulses are 2+   Chest Wall:    No abnormalities observed   Abdomen:     Normal bowel sounds, no masses, no organomegaly, soft        non-tender, non-distended, no guarding, no ascites noted   Extremities:   Moves all extremities well, no edema, no cyanosis, no redness       Lab Review:   Results from last 7 days   Lab Units 24  0503 24  0327   SODIUM mmol/L 139 139   POTASSIUM mmol/L 4.7 5.0   CHLORIDE mmol/L 110* 109*   CO2 mmol/L 20.0* 21.8*   BUN mg/dL 50* 54*   CREATININE mg/dL 1.89* 1.62*   GLUCOSE mg/dL 100* 107*   CALCIUM mg/dL 7.8* 8.2*    AST (SGOT) U/L  --  17   ALT (SGPT) U/L  --  13     Results from last 7 days   Lab Units 01/01/24  0415 12/29/23  0728 12/29/23  0536   CK TOTAL U/L 45  --   --    HSTROP T ng/L  --  508* 473*     Results from last 7 days   Lab Units 01/04/24  0503 01/03/24  0327   WBC 10*3/mm3 6.40 6.49   HEMOGLOBIN g/dL 10.2* 10.2*   HEMATOCRIT % 29.8* 31.4*   PLATELETS 10*3/mm3 104* 114*     Results from last 7 days   Lab Units 01/03/24  0327 01/02/24  0325   INR  1.55* 1.70*     Results from last 7 days   Lab Units 12/29/23  0536   MAGNESIUM mg/dL 2.2     Results from last 7 days   Lab Units 01/03/24  0327   CHOLESTEROL mg/dL 79   TRIGLYCERIDES mg/dL 55   HDL CHOL mg/dL 31*             I reviewed the patient's new clinical results.  I personally viewed and interpreted the patient's EKG  Current Medications:   Scheduled Meds:allopurinol, 100 mg, Oral, Daily  aspirin, 81 mg, Oral, Daily  carvedilol, 12.5 mg, Oral, BID With Meals  furosemide, 40 mg, Intravenous, Q12H  senna-docusate sodium, 2 tablet, Oral, BID  sodium chloride, 10 mL, Intravenous, Q12H      Continuous Infusions:hold, 1 each  hold, 1 each        Allergies:  No Known Allergies    Assessment:     NSTEMI- suspect type 1 given echocardiogram findings. On asa and Coreg. No plavix now due to need for thoracentesis and biopsy of lung. Consider adding statin. Check Lft's first given hepatic congestions. Plan for cath once lung testing completed.   2. LBBB- chronic  3. Possible lung mass- Plan for cytology of fluid today and repeat thoracentesis. 1/2/23 1.4L removed. Yesterday 2.1L removed. Cytology pending  4. On warfarin at home- etiology unclear. Will get notes from PCP   5. Cardiomyopathy- EF 30-35%. Likely ischemic- no ACEI/ARB due to renal function  6. Acute Systolic CHF- orthopnea today and required increase O2 requirements. Was started on IV lasix today. Renal following  7. PIPER on CKD- baseline unknown. Renal stopped fluids. Renal function a little worse today.  Will see what renal wants to go with diuretics.    Plan:           Cynthia Cooper, VIBHA  01/04/24  12:51 EST  Electronically signed by VIBHA Villar, 01/04/24, 12:51 PM EST.

## 2024-01-04 NOTE — CONSULTS
.            [facility]    Palliative Care Initial Consult   Attending Physician: Leyla Espinal MD  Referring Provider: Dr Camacho     Reason for Referral: assistance with clarification of goals of care and hospice referral or discussion  Family/Support: children (Zuly, Janet and Bessy)     Code Status and Medical Interventions:   Ordered at: 12/29/23 0340     Code Status (Patient has no pulse and is not breathing):    CPR (Attempt to Resuscitate)     Medical Interventions (Patient has pulse or is breathing):    Full Support     Goals of Care: To return to home setting, most likely with home health services     HPI:   89 y.o. male with history of congestive heart failure,  coronary artery disease, diabetes, GERD, and hypertension.  He resided at home prior to admission.    Patient presented to [facility] on 12/29/2023 related to progressive shortness of breath and chest pain. Workup in other facility revealed  large right pleural effusion, left lung mass, and elevated troponin. Consults were placed for pulmonology and cardiology. He went for thoracentesis on 1/1/2024 and 1.4 liters removed and cytology negative. He then went for thoracentesis on 1/3/2024 and 2.1 liters removed. There was discussion regarding bronchoscopy, however the decision was made to forego. Cardiology has seen and obtained ECHO that revealed  EF 30-35%. There has been discussion with heart cath but once pulmonary testing completed. He has been evaluated by urology as well for concerns for bladder mass and they declined cystoscopy. His hospital course complicated with acute kidney injury and hepatic congestion. He has been evaluated by nephrology as well. The palliative care team was consulted for support with goals of care conversation due to acute findings and age.   Palliative Care Spoke With: patient and family  Quality of life: fair   Functional Status: : Pt is near baseline level  of mobility, able to walk in yu 100 ft with SBA on 4L 02, o2 on 3L in room but raised to 4L for activity, ret to 3L after activity and when o2 sats >90% per PT notes on 1/3/2024  Due to the Palliative Care Topics Discussed: palliative care, goals of care, care options, resuscitation status, [facility], and discharge options we will establish an advance care plan.   Advance Care Planning   Advance Care Planning Discussion: see below          Review of Systems   Constitutional: Positive for malaise/fatigue.   Cardiovascular:  Positive for dyspnea on exertion and leg swelling. Negative for chest pain.   Respiratory:  Positive for shortness of breath.    Neurological:  Positive for weakness.   Psychiatric/Behavioral:  Negative for depression. The patient is not nervous/anxious.        1- Pain Assessment  Preferred Pain Scale: number (Numeric Rating Pain Scale)    Past Medical History:   Diagnosis Date    Atrial fibrillation     CHF (congestive heart failure)     Coronary artery disease     Diabetes mellitus     GERD (gastroesophageal reflux disease)     Gout     Hypertension      Past Surgical History:   Procedure Laterality Date    CHOLECYSTECTOMY      CORONARY ANGIOPLASTY       Social History     Socioeconomic History    Marital status:    Tobacco Use    Smoking status: Never     Passive exposure: Never    Smokeless tobacco: Never   Vaping Use    Vaping Use: Never used   Substance and Sexual Activity    Alcohol use: Never    Drug use: Never    Sexual activity: Defer       Current Facility-Administered Medications   Medication Dose Route Frequency Provider Last Rate Last Admin    acetaminophen (TYLENOL) tablet 650 mg  650 mg Oral Q4H PRN Doris Ramirez APRN        Or    acetaminophen (TYLENOL) 160 MG/5ML oral solution 650 mg  650 mg Oral Q4H PRN Doris Ramirez APRN        Or    acetaminophen (TYLENOL) suppository 650 mg  650 mg Rectal Q4H PRN Doris Ramirez APRN        allopurinol (ZYLOPRIM)  tablet 100 mg  100 mg Oral Daily María Ordaz MD   100 mg at 01/03/24 1013    aspirin EC tablet 81 mg  81 mg Oral Daily Doris Ramirez APRN   81 mg at 01/03/24 1013    sennosides-docusate (PERICOLACE) 8.6-50 MG per tablet 2 tablet  2 tablet Oral BID Doris Ramirez APRN        And    polyethylene glycol (MIRALAX) packet 17 g  17 g Oral Daily PRN Doris Ramirez APRN        And    bisacodyl (DULCOLAX) EC tablet 5 mg  5 mg Oral Daily PRN Doris Ramirez APRN        And    bisacodyl (DULCOLAX) suppository 10 mg  10 mg Rectal Daily PRN Doris Ramirez APRN        carvedilol (COREG) tablet 12.5 mg  12.5 mg Oral BID With Meals Leana Gaston MD   12.5 mg at 01/03/24 1740    furosemide (LASIX) injection 40 mg  40 mg Intravenous Q12H Janice Shay MD   40 mg at 01/03/24 2153    Hold medication  1 each Does not apply Continuous PRN Julián Espinoza MD        Hold medication  1 each Does not apply Continuous PRN Julián Espinoza MD        nitroglycerin (NITROSTAT) SL tablet 0.4 mg  0.4 mg Sublingual Q5 Min PRN Doris Ramirez APRN        ondansetron (ZOFRAN) injection 4 mg  4 mg Intravenous Q6H PRN Doris Ramirez APRN        sodium chloride 0.9 % flush 10 mL  10 mL Intravenous Q12H Doris Ramirez APRN   10 mL at 01/03/24 2153    sodium chloride 0.9 % flush 10 mL  10 mL Intravenous PRN Doris Ramirez APRN        sodium chloride 0.9 % infusion 40 mL  40 mL Intravenous PRN Doris Ramirez APRN         hold, 1 each  hold, 1 each        acetaminophen **OR** acetaminophen **OR** acetaminophen    senna-docusate sodium **AND** polyethylene glycol **AND** bisacodyl **AND** bisacodyl    hold    hold    nitroglycerin    ondansetron    sodium chloride    sodium chloride    No Known Allergies  Attest that current medications reviewed  including but not limited to prescriptions, over-the counter, herbals and vitamin/mineral/dietary (nutritional) supplements for name, route of  "administration, type, dose and frequency and are current using all immediate resources available at time of dictation.      Intake/Output Summary (Last 24 hours) at 1/4/2024 0750  Last data filed at 1/4/2024 0722  Gross per 24 hour   Intake 480 ml   Output 2600 ml   Net -2120 ml       Physical Exam:    Diagnostics: Reviewed  /56 (BP Location: Right arm, Patient Position: Lying)   Pulse 58   Temp 98.4 °F (36.9 °C)   Resp 17   Ht 175.3 cm (69\")   Wt 98.8 kg (217 lb 14.4 oz)   SpO2 97%   BMI 32.18 kg/m²     Constitutional:       Appearance: Not in distress. Chronically ill-appearing.      Interventions: Nasal cannula in place.      Comments: NC @ 2 liters  Elderly    HENT:      Head:      Comments: Very [place]  Pulmonary:      Effort: Pulmonary effort is normal.   Cardiovascular:      PMI at left midclavicular line.   Edema:     Peripheral edema present.  Neurological:      Mental Status: Alert and oriented to person, place, and time.   Psychiatric:         Mood and Affect: Mood and affect normal.         Speech: Speech normal.         Behavior: Behavior is cooperative.         Thought Content: Thought content normal.         Cognition and Memory: Cognition normal.         Judgment: Judgment normal.       Patient status: Disease state: Controlled with current treatments.  Functional status: Palliative Performance Scale Score: Performance 60% based on the following measures: Ambulation: Reduced, Activity and Evidence of Disease: Unable to do hobby or some work, significant evidence of disease, Self-Care: Occasional assist necessary,  Intake: Normal or reduced, LOC: Full or confusion   ECOG Status(2) Ambulatory and capable of self care, unable to carry out work activity, up and about > 50% or waking hours.  Nutritional status: Albumin  2.4 on 1/4/2024 Body mass index is 32.18 kg/m².      Family support: The patient receives support from his children..  Advance Directives: Advance Directive Status: Patient does " not have advance directive   POA/Healthcare surrogate-n/a.       Impression/Problem List:       Bilateral pleural effusions   Bilateral lung masses  Acute systolic heart failure  N-STEMI  Chronic kidney disease stage III      Recommendations/Plan:  Provide support with goals of care      2.  Palliative care encounter  I spoke with patient and his daughter, Janet and son in law, Ivan at bedside regarding goals of care. The patient has limited understanding of his medical conditions but daughter has fairly good understanding. We did reviewed acute conditions in detail. They have decided against bronchoscopy and cystoscopy due to risk and patient age. They are awaiting PET scan results that were performed earlier this morning. The patient resides at home alone, but has support of his three daughters (Marga, Janet, and Bessy). He is able to complete ADL's independently. He was receiving [facility] home health services prior to admission. His plan is to return to home setting. I did provide them with information regarding palliative care and [facility]/hospice. They are not interested in obtaining information while inpatient at this time. Apparently they have friend of the family that works for [facility] and may reach out to them privately. They aware if they change their mind they can request consult from medical team. The patient doesn't have living will and not interested in completing at this time. We reviewed CPR and ventilator use and is unsure what he would want done. I have encouraged him to have further conversation with his family and if he wishes to change to DNR/DNI to inform medical team. At this time they are just taking things daily. They are leaning towards more comfort approach but just need more time. They were very appreciative of conversation. I spoke with Dr Briggs, and JEANNETTE Phillips.       Thank you for this consult and allowing us to participate in patient's plan of care. Palliative Care Team will sign off.      Time spent:45 minutes spent reviewing medical and medication records, assessing and examining patient, discussing with family, answering questions, providing some guidance about a plan and documentation of care, and coordinating care with other healthcare members, with > 50% time spent face to face.   30  minutes spent on advance care planning.    Sandra Dutton, APRN  1/4/2024  07:50 EST

## 2024-01-04 NOTE — PROGRESS NOTES
"   LOS: 6 days     Chief Complaint/ Reason for encounter: PIPER/CKD, abnormal renal ultrasound    Subjective   01/02/24 : He is doing about the same today, denies complaints  Shortness of breath improved after 1.4 L thoracentesis  Now with lung nodules concerning for possible lung cancer and possible malignant pleural effusion  No edema.  He was started on IV fluids in preparation for heart cath but that is now on hold and his diet has been restarted    1/3 patient had a difficult night with sob. Still feeling sob. Not talking much.     1/4: He is feeling a little better today.  Creatinine up slightly  He reports less shortness of breath after the thoracentesis and with diuresis.  Only about 800 cc of urine recorded  Good appetite, no nausea or vomiting    Medical history reviewed:  History of Present Illness    Subjective    History taken from: Patient and chart    Vital Signs  Temp:  [97.5 °F (36.4 °C)-98.4 °F (36.9 °C)] 97.5 °F (36.4 °C)  Heart Rate:  [57-71] 57  Resp:  [17-18] 17  BP: (104-125)/(44-56) 109/54       Wt Readings from Last 1 Encounters:   12/29/23 0314 98.8 kg (217 lb 14.4 oz)       Objective:  Vital signs: (most recent): Blood pressure 109/54, pulse 57, temperature 97.5 °F (36.4 °C), temperature source Oral, resp. rate 17, height 175.3 cm (69\"), weight 98.8 kg (217 lb 14.4 oz), SpO2 97%.                Objective:  General Appearance:  he looks tired and uncomfortable.   HEENT: Mucous membranes moist, no injury, oropharynx clear  Lungs:+ crackles in bases.     Heart: Normal rate.  Regular rhythm.  S1, S2 normal.  No murmur.   Abdomen: Abdomen is soft.  Bowel sounds are normal, no abdominal tenderness.  There is no rebound or guarding  Extremities: Trace edema of bilateral lower extremities  Neurological: No focal motor or sensory deficits, pupils reactive  Skin:  Warm and dry.  No rash or cyanosis.       Results Review:    Intake/Output:     Intake/Output Summary (Last 24 hours) at 1/4/2024 " 1447  Last data filed at 1/4/2024 1228  Gross per 24 hour   Intake 240 ml   Output 3400 ml   Net -3160 ml         DATA:  Radiology and Labs:  The following labs independently reviewed by me. Additional labs ordered for tomorrow a.m.  Interval notes, chart personally reviewed by me.   Old records independently reviewed showing unknown CKD.  Awaiting records    New problems include echogenicity involving the bladder, probable renal cyst  Discussed with patient himself at bedside    Risk/ complexity of medical care/ medical decision making moderate complexity, abnormal renal imaging, possible new lung cancer, status post thoracentesis, eventual need for heart catheterization with IV contrast      Labs:   Recent Results (from the past 24 hour(s))   CBC (No Diff)    Collection Time: 01/04/24  5:03 AM    Specimen: Blood   Result Value Ref Range    WBC 6.40 3.40 - 10.80 10*3/mm3    RBC 3.02 (L) 4.14 - 5.80 10*6/mm3    Hemoglobin 10.2 (L) 13.0 - 17.7 g/dL    Hematocrit 29.8 (L) 37.5 - 51.0 %    MCV 98.7 (H) 79.0 - 97.0 fL    MCH 33.8 (H) 26.6 - 33.0 pg    MCHC 34.2 31.5 - 35.7 g/dL    RDW 14.3 12.3 - 15.4 %    RDW-SD 52.0 37.0 - 54.0 fl    MPV 11.4 6.0 - 12.0 fL    Platelets 104 (L) 140 - 450 10*3/mm3   Renal Function Panel    Collection Time: 01/04/24  5:03 AM    Specimen: Blood   Result Value Ref Range    Glucose 100 (H) 65 - 99 mg/dL    BUN 50 (H) 8 - 23 mg/dL    Creatinine 1.89 (H) 0.76 - 1.27 mg/dL    Sodium 139 136 - 145 mmol/L    Potassium 4.7 3.5 - 5.2 mmol/L    Chloride 110 (H) 98 - 107 mmol/L    CO2 20.0 (L) 22.0 - 29.0 mmol/L    Calcium 7.8 (L) 8.6 - 10.5 mg/dL    Albumin 2.4 (L) 3.5 - 5.2 g/dL    Phosphorus 3.9 2.5 - 4.5 mg/dL    Anion Gap 9.0 5.0 - 15.0 mmol/L    BUN/Creatinine Ratio 26.5 (H) 7.0 - 25.0    eGFR 33.5 (L) >60.0 mL/min/1.73   Iron Profile    Collection Time: 01/04/24  5:03 AM    Specimen: Blood   Result Value Ref Range    Iron 48 (L) 59 - 158 mcg/dL    Iron Saturation (TSAT) 20 20 - 50 %     Transferrin 161 (L) 200 - 360 mg/dL    TIBC 240 (L) 298 - 536 mcg/dL   Ferritin    Collection Time: 01/04/24  5:03 AM    Specimen: Blood   Result Value Ref Range    Ferritin 216.00 30.00 - 400.00 ng/mL   POC Glucose Once    Collection Time: 01/04/24  8:01 AM    Specimen: Blood   Result Value Ref Range    Glucose 100 70 - 130 mg/dL       Radiology:  Pertinent radiology studies were reviewed as described above      Medications have been reviewed separately in chart overview      ASSESSMENT:  CKD, unknown stage but if his creatinine on admission and today's creatinine are near baseline then this is likely stage IIIb  PIPER, worse today with diuresis    Acute respiratory failure with hypoxia, improved after thoracentesis    Chest pain  Bladder lesion    Pleural effusion, possibly malignant    CKD (chronic kidney disease)    HTN (hypertension)    Shortness of breath    Lung mass, highly concerning for primary malignancy    Elevated troponin    Atrial fibrillation    Anemia    Thrombocytopenia  Bladder mass, not seen on CT.  Family wants to hold off on cystoscopy  New findings of cirrhosis with ascites        DISCUSSION/PLAN:   His renal function is slightly worse today but his creatinine has wobbled between 1.6 and 1.9 throughout most of this hospital admission  Not a tremendous amount of urine output recorded so I do not think he is over diuresed at this point  Still somewhat dyspneic with volume overload on exam  I think it is reasonable to continue IV diuretics, mainly for comfort at this time    On a bigger scale, palliative care is meeting with patient and family today.  Multiple ongoing morbidities including lung masses, bladder mass, renal failure, heart failure and finding of cirrhosis on CT  Seems family is not interested in any type of invasive procedure but does want to continue with current, supportive medical therapy including diuretics    Will continue Lasix at current dose for now.  Explained to family that  he will likely need to go home on oral diuretics with dose to be determined  BP stable on carvedilol  Will follow-up a.m. labs  Overall prognosis seems poor at this point    Continue to monitor electrolytes and volume closely       Nate Briggs MD  [facility]   Office phone number: [phone]  Answering service phone number: [phone]    01/04/24  14:47 EST

## 2024-01-04 NOTE — PROGRESS NOTES
"                                              LOS: 6 days   Patient Care Team:  Lan De Dios Jr. as PCP - General (Family Medicine)    Chief Complaint:  F/up lung mass ? and pleural effusion    Subjective   Interval History      On 3 L oxygen.  Dyspnea improved.  Has mild cough with no sputum production.    REVIEW OF SYSTEMS:   CARDIOVASCULAR: No chest pain, chest pressure or chest discomfort. No palpitations or edema.   GASTROINTESTINAL: No anorexia, nausea, vomiting or diarrhea. No abdominal pain.  CONSTITUTIONAL: No fever or chills.     Ventilator/Non-Invasive Ventilation Settings (From admission, onward)      None                  Physical Exam:     Vital Signs  Temp:  [97.6 °F (36.4 °C)-98.4 °F (36.9 °C)] 97.6 °F (36.4 °C)  Heart Rate:  [55-71] 58  Resp:  [16-26] 17  BP: (104-125)/(44-64) 119/51    Intake/Output Summary (Last 24 hours) at 1/4/2024 0959  Last data filed at 1/4/2024 0800  Gross per 24 hour   Intake 240 ml   Output 3400 ml   Net -3160 ml     Flowsheet Rows      Flowsheet Row First Filed Value   Admission Height 175.3 cm (69\") Documented at 12/29/2023 0314   Admission Weight 98.8 kg (217 lb 14.4 oz) Documented at 12/29/2023 0314            PPE used per hospital policy    General Appearance:   Alert, cooperative, in no acute distress   ENMT:  Mallampati score 3, moist mucous membrane   Eyes:  Pupils equal and reactive to light. EOMI   Neck:   Trachea midline. No thyromegaly.   Lungs:   Diminished air entry on the right with crackles.  No wheezing.  Nonlabored breathing    Heart:   Regular rhythm and normal rate, normal S1 and S2, no         murmur   Skin:   No rash or ecchymosis.  Thickening of the skin in the lower extremities with dark discoloration.   Abdomen:    Obese. Soft. No tenderness. No HSM.   Neuro/psych:  Conscious, alert, oriented x3. Strength 5/5 in upper and lower  ext.  Appropriate mood and affect   Extremities:  No cyanosis, clubbing but edema.  Warm extremities and " well-perfused          Results Review:        Results from last 7 days   Lab Units 01/04/24  0503 01/03/24 0327 01/02/24  0325   SODIUM mmol/L 139 139 138   POTASSIUM mmol/L 4.7 5.0 4.9   CHLORIDE mmol/L 110* 109* 108*   CO2 mmol/L 20.0* 21.8* 22.0   BUN mg/dL 50* 54* 49*   CREATININE mg/dL 1.89* 1.62* 1.63*   GLUCOSE mg/dL 100* 107* 78   CALCIUM mg/dL 7.8* 8.2* 7.8*     Results from last 7 days   Lab Units 01/01/24  0415 12/29/23  0728 12/29/23  0536   CK TOTAL U/L 45  --   --    HSTROP T ng/L  --  508* 473*     Results from last 7 days   Lab Units 01/04/24  0503 01/03/24 0327 01/02/24 0325   WBC 10*3/mm3 6.40 6.49 5.96   HEMOGLOBIN g/dL 10.2* 10.2* 10.4*   HEMATOCRIT % 29.8* 31.4* 30.0*   PLATELETS 10*3/mm3 104* 114* 101*     Results from last 7 days   Lab Units 01/03/24  0327 01/02/24  0325 01/01/24 0415   INR  1.55* 1.70* 2.16*                               I reviewed the patient's new clinical results.        Medication Review:   allopurinol, 100 mg, Oral, Daily  aspirin, 81 mg, Oral, Daily  carvedilol, 12.5 mg, Oral, BID With Meals  furosemide, 40 mg, Intravenous, Q12H  senna-docusate sodium, 2 tablet, Oral, BID  sodium chloride, 10 mL, Intravenous, Q12H      Diagnostic imaging:  I personally and Independently reviewed and interpreted the following images:  CT chest 1/2/2024: Bilateral pleural effusion more prominent on the right.  Lung consolidation/masses/round atelectasis bilaterally, located in the dependent region of the lungs on the right and in the left upper lobe.    PET scan 1/4/2024: Findings summarized below.    Assessment     Bilateral pleural effusion: Trace on the left.  Large on the right.  S/p right Thora 1/1/2024 with removal of 1.4 L -> transudate indicative of CHF/volume overload +/- cirrhosis.  Cytology negative.  S/p Thora 1/3 with removal of 2.1 L, cytology pending  Mild pulmonary edema  Bilateral lung masses.  No clear etiology.  Could represent malignancy or benign round  atelectasis.  No prior CT to compare.  PET scan showed significantly PET avid masses in the upper lobes bilaterally.  The right lower lobe dependent consolidation like mass is not PET avid.  PET avid prostate nodule  Cirrhosis on CT images with ascites  Borderline mediastinal adenopathies specifically station 4s.  Slightly PET avid.  Acute systolic CHF, EF 30-35%.  Pulmonary HTN, RVSP 45 on echo 12/29 with moderately dilated LA consistent with group 2.  Non-STEMI type I  Anticoagulation with Coumadin.  No clear indication  History of CAD  PIPER  Anemia and thrombocytopenia, unknown chronicity.          Plan       Repeat Thora tomorrow.  There is still large pleural effusion.  I suspect that some of the effusion at least is related to hepatic hydrothorax..  The cytology from 1/1 is negative but cytology from 1/3 is pending.  Will perform cytology on the thoracentesis tomorrow and this will give us a good yield (total of 3 thoracentesis).  Discussed the PET result with the family.  At that time, official report was pending.  Patient has large PET avid masses which could still be inflammatory.  However malignancy remains on the differential.  I offered bronchoscopy and biopsy but they were not sure whether they want to perform that.  I did discuss with them that if patient has lung cancer then he will unlikely be a candidate for treatment.  Will go ahead and place the patient n.p.o. after midnight.  Potential bronchoscopy tomorrow.  EBUS can be performed for the mediastinal/hilar adenopathies but they appear to be small and minimally PET avid.  Add empiric antibiotic therapy with Rocephin to cover potential pneumonia although this is not clinically evident (lack of fever, leukocytosis and normal Pro-Raul).  Continue diuretic therapy.  Consider adding spironolactone for ascites and cirrhosis.  Check PSA    Continue holding Coumadin  Oxygen by NC and titrate to keep respiratory >90%  Incentive spirometry    Discussed with  the patient's family and his nurse.        Julián Espinoza MD  01/04/24  09:59 EST          This note was dictated utilizing Dragon dictation

## 2024-01-04 NOTE — PLAN OF CARE
Goal Outcome Evaluation:  Plan of Care Reviewed With: patient, family           Outcome Evaluation: Patient with no c/o pain. Patient b/p 100-110's/50's HR 50-60's remains SB with PVC's and bigeminy. Patient NPO for am PET scan. Will continue to monitor and await am scan.       Diuretic in Use: lasix   Response to Diuretics (Output greater than intake): yes   Daily Weight (up or down): last wt 217 lbs   O2 Requirements: 3 liters   Functional Status (Activity level, tolerance and respiratory symptoms): no c/o shortness of air   Discharge Plans: TBD- await discharge plans.

## 2024-01-05 LAB
ALBUMIN SERPL-MCNC: 2.3 G/DL (ref 3.5–5.2)
ANION GAP SERPL CALCULATED.3IONS-SCNC: 6.3 MMOL/L (ref 5–15)
BUN SERPL-MCNC: 53 MG/DL (ref 8–23)
BUN/CREAT SERPL: 30.8 (ref 7–25)
CALCIUM SPEC-SCNC: 8.1 MG/DL (ref 8.6–10.5)
CHLORIDE SERPL-SCNC: 108 MMOL/L (ref 98–107)
CO2 SERPL-SCNC: 23.7 MMOL/L (ref 22–29)
CREAT SERPL-MCNC: 1.72 MG/DL (ref 0.76–1.27)
CYTO UR: NORMAL
DEPRECATED RDW RBC AUTO: 51.4 FL (ref 37–54)
EGFRCR SERPLBLD CKD-EPI 2021: 37.5 ML/MIN/1.73
ERYTHROCYTE [DISTWIDTH] IN BLOOD BY AUTOMATED COUNT: 14.4 % (ref 12.3–15.4)
GLUCOSE SERPL-MCNC: 82 MG/DL (ref 65–99)
HCT VFR BLD AUTO: 31.6 % (ref 37.5–51)
HGB BLD-MCNC: 10.1 G/DL (ref 13–17.7)
LAB AP CASE REPORT: NORMAL
MAGNESIUM SERPL-MCNC: 2.3 MG/DL (ref 1.6–2.4)
MCH RBC QN AUTO: 31.2 PG (ref 26.6–33)
MCHC RBC AUTO-ENTMCNC: 32 G/DL (ref 31.5–35.7)
MCV RBC AUTO: 97.5 FL (ref 79–97)
PATH REPORT.FINAL DX SPEC: NORMAL
PATH REPORT.GROSS SPEC: NORMAL
PHOSPHATE SERPL-MCNC: 3.5 MG/DL (ref 2.5–4.5)
PLATELET # BLD AUTO: 114 10*3/MM3 (ref 140–450)
PMV BLD AUTO: 11.1 FL (ref 6–12)
POTASSIUM SERPL-SCNC: 4.7 MMOL/L (ref 3.5–5.2)
RBC # BLD AUTO: 3.24 10*6/MM3 (ref 4.14–5.8)
SODIUM SERPL-SCNC: 138 MMOL/L (ref 136–145)
WBC NRBC COR # BLD AUTO: 6.3 10*3/MM3 (ref 3.4–10.8)

## 2024-01-05 PROCEDURE — 25010000002 CEFTRIAXONE PER 250 MG: Performed by: INTERNAL MEDICINE

## 2024-01-05 PROCEDURE — 83735 ASSAY OF MAGNESIUM: CPT | Performed by: STUDENT IN AN ORGANIZED HEALTH CARE EDUCATION/TRAINING PROGRAM

## 2024-01-05 PROCEDURE — 85027 COMPLETE CBC AUTOMATED: CPT | Performed by: STUDENT IN AN ORGANIZED HEALTH CARE EDUCATION/TRAINING PROGRAM

## 2024-01-05 PROCEDURE — 80069 RENAL FUNCTION PANEL: CPT | Performed by: INTERNAL MEDICINE

## 2024-01-05 PROCEDURE — 25010000002 FUROSEMIDE PER 20 MG: Performed by: INTERNAL MEDICINE

## 2024-01-05 PROCEDURE — 97110 THERAPEUTIC EXERCISES: CPT

## 2024-01-05 PROCEDURE — 99232 SBSQ HOSP IP/OBS MODERATE 35: CPT | Performed by: INTERNAL MEDICINE

## 2024-01-05 RX ADMIN — CARVEDILOL 12.5 MG: 12.5 TABLET, FILM COATED ORAL at 16:44

## 2024-01-05 RX ADMIN — CARVEDILOL 12.5 MG: 12.5 TABLET, FILM COATED ORAL at 10:01

## 2024-01-05 RX ADMIN — CEFTRIAXONE SODIUM 1000 MG: 1 INJECTION, POWDER, FOR SOLUTION INTRAMUSCULAR; INTRAVENOUS at 21:14

## 2024-01-05 RX ADMIN — Medication 10 ML: at 21:15

## 2024-01-05 RX ADMIN — ALLOPURINOL 100 MG: 100 TABLET ORAL at 10:01

## 2024-01-05 RX ADMIN — FUROSEMIDE 40 MG: 10 INJECTION, SOLUTION INTRAMUSCULAR; INTRAVENOUS at 10:01

## 2024-01-05 RX ADMIN — FUROSEMIDE 40 MG: 10 INJECTION, SOLUTION INTRAMUSCULAR; INTRAVENOUS at 21:15

## 2024-01-05 RX ADMIN — ASPIRIN 81 MG: 81 TABLET, COATED ORAL at 10:01

## 2024-01-05 RX ADMIN — Medication 10 ML: at 10:02

## 2024-01-05 NOTE — PROGRESS NOTES
Name: Derian Hendricks ADMIT: 2023   : 1934  PCP: Lan De Dios Jr.    MRN: [id number] LOS: 7 days   AGE/SEX: 89 y.o. male  ROOM: [facility]     Subjective   Subjective   Laying in bed.  Family at bedside.  Family and patient does not want bronchoscopy at this point.  No new complaints.  No swallowing, chest pain.  No fevers no chills.  Does have cough.    Review of Systems   As above  Objective   Objective   Vital Signs  Temp:  [97.8 °F (36.6 °C)-98.6 °F (37 °C)] 97.8 °F (36.6 °C)  Heart Rate:  [57-63] 58  Resp:  [16-17] 16  BP: (109-119)/(53-60) 112/58  SpO2:  [94 %-96 %] 94 %  on  Flow (L/min):  [3-4] 4;   Device (Oxygen Therapy): nasal cannula  Body mass index is 31.97 kg/m².  Physical Exam    General: Alert, lying in bed, not in distress, ill-appearing  HEENT: Normocephalic, atraumatic  CV: Regular rate and rhythm, no murmurs rubs or gallops  Lungs: Bilateral crackles, no wheezing, on 4 L nasal cannula  Abdomen: Soft, nontender, nondistended  Extremities: Bilateral lower extremity edema,, no cyanosis     Results Review     I reviewed the patient's new clinical results.  Results from last 7 days   Lab Units 24  02524  0503 24  0325   WBC 10*3/mm3 6.30 6.40 6.49 5.96   HEMOGLOBIN g/dL 10.1* 10.2* 10.2* 10.4*   PLATELETS 10*3/mm3 114* 104* 114* 101*     Results from last 7 days   Lab Units 24  0257 24  0503 24  03224  0325   SODIUM mmol/L 138 139 139 138   POTASSIUM mmol/L 4.7 4.7 5.0 4.9   CHLORIDE mmol/L 108* 110* 109* 108*   CO2 mmol/L 23.7 20.0* 21.8* 22.0   BUN mg/dL 53* 50* 54* 49*   CREATININE mg/dL 1.72* 1.89* 1.62* 1.63*   GLUCOSE mg/dL 82 100* 107* 78   Estimated Creatinine Clearance: 33.6 mL/min (A) (by C-G formula based on SCr of 1.72 mg/dL (H)).  Results from last 7 days   Lab Units 24  0257 24  0503 24  0327 24  0325   ALBUMIN g/dL 2.3* 2.4* 2.4* 2.4*   AST (SGOT) U/L  --   --  17  --    ALT  "(SGPT) U/L  --   --  13  --      Results from last 7 days   Lab Units 01/05/24  0257 01/04/24  0503 01/03/24  0327 01/02/24  0325   CALCIUM mg/dL 8.1* 7.8* 8.2* 7.8*   ALBUMIN g/dL 2.3* 2.4* 2.4* 2.4*   MAGNESIUM mg/dL 2.3  --   --   --    PHOSPHORUS mg/dL 3.5 3.9 2.9 3.3         No results found for: \"COVID19\"  Glucose   Date/Time Value Ref Range Status   01/04/2024 0801 100 70 - 130 mg/dL Final           NM PET/CT Skull Base to Mid Thigh  Narrative: F-18 FDG PET SKULL BASE TO MID THIGH WITH PET CT FUSION.     HISTORY: Bilateral upper lobe masslike consolidations. Right pleural  fluid from 1/1/2024 negative for malignant cells.     TECHNIQUE: Radiation dose reduction techniques were utilized, including  automated exposure control and exposure modulation based on body size.   Blood glucose level at time of injection was 100 mg/dL. 6.5 mCi of F-18  FDG were injected and PET was performed from skull base to mid thigh. CT  was obtained for localization and attenuation correction. Time at  injection 8:00. PET start time 945 a.     Comparison: CT chest 1/2/2024 and 1/1/2024. CT abdomen pelvis 1/3/2024..     FINDINGS:     Head/neck: No suspicious uptake.     Chest: Hypermetabolic bilateral upper lobe areas of masslike  consolidation are unchanged in size from recent CTs. Areas are most  accurately measured on recent CTs. Reference max SUV on the right of 6.5  (series 4/image 135). Reference max SUV on the left of 8.3 (series  4/image 120).     Right lower lobe remains mostly collapsed. Right middle lobe atelectasis  with mild uptake. Mildly hypermetabolic groundglass opacities remain  predominantly throughout the left lower lobe.     Enlarged mediastinal and bihilar lymph nodes. Reference right hilar  lymph node with max SUV of 4.4 (series 4/image 167). Reference left  hilar lymph node with max SUV of 3.3 (series 4/image 161). Reference  approximately 1.1 cm low left paratracheal lymph node with max SUV of 3  (series " 4/image 141).     Small left pleural effusion with max SUV of 2.5. Large right pleural  effusion without associated hypermetabolism, both similar in size to  recent CT.     Bilateral gynecomastia     Abdomen/pelvis: Focal hypermetabolism in the left peripheral prostate,  max SUV of 10.2 (series 4/image 400). No hypermetabolic pelvic lymph  nodes.     Cirrhosis with sequela of portal hypertension with moderate volume  abdominal pelvic ascites.     Bones: No suspicious uptake.        Impression: 1. Hypermetabolic bilateral upper lobe areas of masslike consolidation,  unchanged in size from recent CTs. Differential remains  pneumonitis/pneumonia versus malignancy.  2. Indeterminant mildly hypermetabolic mediastinal and bihilar lymph  nodes. Attention on follow-up.  3. Mildly hypermetabolic left lower lobe predominant groundglass  opacities, likely infectious/inflammatory.  4. Small left pleural effusion has indeterminant mild intrinsic  metabolism. Correlation with cytology as possible.  5. Focal hypermetabolism in the left peripheral prostate may represent  prostatitis versus prostate adenocarcinoma. Correlation with PSA and  further evaluation with prostate MRI as clinically indicated.  6. Cirrhosis with sequela of portal hypertension including moderate  volume abdominal pelvic ascites.        This report was finalized on 1/4/2024 2:32 PM by Dr. Kristopher Redd M.D on Workstation: [id number]       Scheduled Medications  allopurinol, 100 mg, Oral, Daily  aspirin, 81 mg, Oral, Daily  carvedilol, 12.5 mg, Oral, BID With Meals  cefTRIAXone, 1,000 mg, Intravenous, Q24H  furosemide, 40 mg, Intravenous, Q12H  senna-docusate sodium, 2 tablet, Oral, BID  sodium chloride, 10 mL, Intravenous, Q12H    Infusions  hold, 1 each  hold, 1 each    Diet  Diet: Cardiac Diets; Healthy Heart (2-3 Na+); Texture: Regular Texture (IDDSI 7); Fluid Consistency: Thin (IDDSI 0)    I have personally reviewed     [x]  Laboratory   []  Microbiology    [x]  Radiology   []  EKG/Telemetry  []  Cardiology/Vascular   []  Pathology    []  Records       Assessment/Plan     Active Hospital Problems    Diagnosis  POA    **Acute respiratory failure with hypoxia [J96.01]  Unknown    Chest pain [R07.9]  Yes    Pleural effusion [J90]  Yes    CKD (chronic kidney disease) [N18.9]  Yes    HTN (hypertension) [I10]  Unknown    Shortness of breath [R06.02]  Yes    Lung mass [R91.8]  Unknown    Elevated troponin [R79.89]  Unknown    Atrial fibrillation [I48.91]  Unknown    Anemia [D64.9]  Unknown    Thrombocytopenia [D69.6]  Unknown      Resolved Hospital Problems   No resolved problems to display.       Mr. Hendricks is a 89 y.o. male with a history of CHF, CAD, diabetes, CKD, GERD, and HTN was transferred to [facility] from  [facility] for further evaluation and management of chest pain and shortness of breath..    Bilateral pleural effusion:  Large on the right.  S/p right sided thoracentesis status 01/1/2024 1.4 of fluid was removed, fluid studies consistent with transudate.  Cytology was negative for malignant cells.  Repeat thoracentesis performed on the right side on 01/03/2023 on 2.1 L of pleural fluid was removed, repeat cytology pending.  Pulmonology following, plan for repeat thoracentesis today.      Bilateral lung masses.  No clear etiology.  Could represent malignancy or benign round atelectasis.CT chest 01/02/2024-right and unchanged small left pleural effusions. Right lower lobe remains mostly collapsed. Near identical areas of consolidation the bilateral upper lobes. Both pneumonitis/pneumonia (favored given bilaterality) and malignancy remain in the differential.    Initial cytology was negative for left pleural effusion, status post right thoracentesis o1/03/2023 with repeat cytology pending.  PET scan 01/04/2024 for felt hypermetabolic bilateral upper lobe areas of masslike consolidation, unchanged in size from rec  -Family/patient does not want  bronchoscopy at this point.  -Will need repeat outpatient imaging to follow-up lung mass.  -Continue IV ceftriaxone for possible pneumonia      Bladder mass/abnormal prostate imaging on PET scan-PET scan showed focal hypermetabolism in the left peripheral prostate may represent prostatitis versus prostate adenocarcinoma.  Urinalysis was normal on admission.  PSA normal.  Urology evaluated.  Recommend outpatient cystoscopy.  Follow-up with urology in 3 to 4 weeks.        Acute systolic heart failure/NSTEMI- suspect type 1 per cardiology.  Echo 12/29/2023 showed EF of 31-35,  hypokinetic mid anterior, mid inferolateral and mid inferoseptal walls. The following left ventricular wall segments are akinetic, apical anterior, apical lateral, apical inferior, apical septal and apex. -on asa and Coreg.  No plans for heart cath.  Continue medical management with aspirin, statin, carvedilol.      Hypertension: BP acutely stable, monitor      PIPER on CKD-Stage 3? Cr was on 12/29/2023 1.5 on admission.  no prior creatine available prior to admission.  Nephrology following.  On IV Lasix.  Monitor daily BMP.  Creatinine improved today.      On warfarin at home- etiology unclear, currently on hold.  Dicussed with Daughter, reports he has been on it for years since the heart attack in 1989 and nobody took him off of it, but no history of blood clot, pulmonary embolism, irregular heartbeat.  Resume if no procedures planned.    Thrombocytopenia: Stable.  Monitor.      Anemia of chronic disease: Iron panel 01//04/2023 consistent with anemia of chronic disease.  Hemoglobin stable.  Monitor.        SCDs for DVT prophylaxis.  Full code.  Discussed with patient.  Discussed with family  Anticipate discharge to be determined      Copied text in this note has been reviewed and is accurate as of 01/05/24.         Dictated utilizing Dragon dictation        Leyla Espinal MD  [facility]  01/05/24  12:37 EST

## 2024-01-05 NOTE — THERAPY TREATMENT NOTE
Patient Name: Derian Hendricks  : 1934    MRN: [id number]                              Today's Date: 2024       Admit Date: 2023    Visit Dx:     ICD-10-CM ICD-9-CM   1. Follow-up exam  Z09 V67.9     Patient Active Problem List   Diagnosis    Chest pain    Pleural effusion    CKD (chronic kidney disease)    HTN (hypertension)    Shortness of breath    Acute respiratory failure with hypoxia    Lung mass    Elevated troponin    Atrial fibrillation    Anemia    Thrombocytopenia     Past Medical History:   Diagnosis Date    Atrial fibrillation     CHF (congestive heart failure)     Coronary artery disease     Diabetes mellitus     GERD (gastroesophageal reflux disease)     Gout     Hypertension      Past Surgical History:   Procedure Laterality Date    CHOLECYSTECTOMY      CORONARY ANGIOPLASTY        General Information       Row Name 24 1015          Physical Therapy Time and Intention    Document Type therapy note (daily note)  -AR     Mode of Treatment physical therapy  -AR       Row Name 24 1015          General Information    Patient Profile Reviewed yes  -AR     Existing Precautions/Restrictions oxygen therapy device and L/min;fall  -AR       Row Name 24 1015          Cognition    Orientation Status (Cognition) oriented x 3  -AR               User Key  (r) = Recorded By, (t) = Taken By, (c) = Cosigned By      Initials Name Provider Type    AR Missy Puente PT Physical Therapist                   Mobility       Row Name 24 1016          Bed Mobility    Bed Mobility supine-sit  -AR     Supine-Sit Ness (Bed Mobility) standby assist  -AR     Sit-Supine Ness (Bed Mobility) not tested  -AR       Row Name 24 1016          Sit-Stand Transfer    Sit-Stand Ness (Transfers) contact guard  -AR       Row Name 24 1016          Gait/Stairs (Locomotion)    Ness Level (Gait) contact guard  -AR     Distance in Feet (Gait) 200' single UE  support on IV pole for ~100', slow s huffling steps, w/ forward flexed posture  -AR               User Key  (r) = Recorded By, (t) = Taken By, (c) = Cosigned By      Initials Name Provider Type    AR Missy Puente, PT Physical Therapist                   Obj/Interventions       Row Name 01/05/24 1016          Motor Skills    Therapeutic Exercise --  none, MD having discussion w/ family/pt  -AR       Row Name 01/05/24 1016          Balance    Dynamic Standing Balance contact guard  -AR               User Key  (r) = Recorded By, (t) = Taken By, (c) = Cosigned By      Initials Name Provider Type    AR Missy Puente, PT Physical Therapist                   Goals/Plan    No documentation.                  Clinical Impression       Row Name 01/05/24 1017          Pain    Pretreatment Pain Rating 0/10 - no pain  -AR     Posttreatment Pain Rating 0/10 - no pain  -AR     Pain Intervention(s) Repositioned  -AR       Row Name 01/05/24 1017          Plan of Care Review    Plan of Care Reviewed With patient  -AR     Outcome Evaluation Improved activity tolerance during PT today.  Able to ambulate 200' single UE support on IV pole for ~100', slow shuffling steps, w/ forward flexed posture.  Sp02 to 90% while walking on 4L with some SOB, increaed to 6L during last 50'.  Rn and MD notified.  Recommend sitting in chair few hours/day and ambulating with staff in yu 2-3x/day.  -AR       Row Name 01/05/24 1017          Therapy Assessment/Plan (PT)    Rehab Potential (PT) good, to achieve stated therapy goals  -AR     Criteria for Skilled Interventions Met (PT) yes;meets criteria  -AR     Therapy Frequency (PT) 6 times/wk  -AR       Row Name 01/05/24 1017          Vital Signs    Pre SpO2 (%) 96  -AR     O2 Delivery Pre Treatment supplemental O2  -AR     Intra SpO2 (%) 90  -AR     O2 Delivery Intra Treatment supplemental O2  -AR     Post SpO2 (%) 94  -AR     O2 Delivery Post Treatment supplemental O2  -AR       Row Name 01/05/24  1017          Positioning and Restraints    Pre-Treatment Position in bed  -AR     Post Treatment Position chair  -AR     In Chair notified nsg;reclined;sitting;call light within reach;encouraged to call for assist;exit alarm on;with family/caregiver;with other staff  -AR               User Key  (r) = Recorded By, (t) = Taken By, (c) = Cosigned By      Initials Name Provider Type    AR Missy Puente, PT Physical Therapist                   Outcome Measures       Row Name 01/05/24 1018          How much help from another person do you currently need...    Turning from your back to your side while in flat bed without using bedrails? 4  -AR     Moving from lying on back to sitting on the side of a flat bed without bedrails? 4  -AR     Moving to and from a bed to a chair (including a wheelchair)? 3  -AR     Standing up from a chair using your arms (e.g., wheelchair, bedside chair)? 3  -AR     Climbing 3-5 steps with a railing? 3  -AR     To walk in hospital room? 3  -AR     AM-PAC 6 Clicks Score (PT) 20  -AR     Highest Level of Mobility Goal 6 --> Walk 10 steps or more  -AR       Row Name 01/05/24 1018          Functional Assessment    Outcome Measure Options AM-PAC 6 Clicks Basic Mobility (PT)  -AR               User Key  (r) = Recorded By, (t) = Taken By, (c) = Cosigned By      Initials Name Provider Type    Missy Reaves, ZAIDA Physical Therapist                                 Physical Therapy Education       Title: PT OT SLP Therapies (In Progress)       Topic: Physical Therapy (In Progress)       Point: Mobility training (In Progress)       Learning Progress Summary             Patient Acceptance, E, NR by AR at 1/5/2024 1019    Acceptance, E,D, DU by PC at 1/3/2024 1208    Acceptance, E,D, DU by PC at 1/2/2024 1457    Acceptance, E, VU by ER at 12/31/2023 1038   Family Acceptance, E, NR by AR at 1/5/2024 1019                         Point: Home exercise program (In Progress)       Learning Progress Summary              Patient Acceptance, E, NR by AR at 1/5/2024 1019    Acceptance, E,D, DU by PC at 1/3/2024 1208    Acceptance, E, VU by ER at 12/31/2023 1038   Family Acceptance, E, NR by AR at 1/5/2024 1019                         Point: Body mechanics (In Progress)       Learning Progress Summary             Patient Acceptance, E, NR by AR at 1/5/2024 1019    Acceptance, E,D, DU by PC at 1/3/2024 1208    Acceptance, E, VU by ER at 12/31/2023 1038   Family Acceptance, E, NR by AR at 1/5/2024 1019                         Point: Precautions (In Progress)       Learning Progress Summary             Patient Acceptance, E, NR by AR at 1/5/2024 1019    Acceptance, E,D, DU by PC at 1/3/2024 1208    Acceptance, E, VU by ER at 12/31/2023 1038    Acceptance, E, VU by MG at 12/29/2023 1527   Family Acceptance, E, NR by AR at 1/5/2024 1019                                         User Key       Initials Effective Dates Name Provider Type Discipline    PC 06/16/21 -  Aissatou Ann PT Physical Therapist PT    AR 06/16/21 -  Missy Puente, PT Physical Therapist PT    MG 05/24/22 -  Ignacia Crisostomo, PT Physical Therapist PT    ER 10/15/23 -  Chelly Brooks, PT Physical Therapist PT                  PT Recommendation and Plan     Plan of Care Reviewed With: patient  Outcome Evaluation: Improved activity tolerance during PT today.  Able to ambulate 200' single UE support on IV pole for ~100', slow shuffling steps, w/ forward flexed posture.  Sp02 to 90% while walking on 4L with some SOB, increaed to 6L during last 50'.  Rn and MD notified.  Recommend sitting in chair few hours/day and ambulating with staff in yu 2-3x/day.     Time Calculation:         PT Charges       Row Name 01/05/24 1015             Time Calculation    Start Time 0810  -AR      Stop Time 0834  -AR      Time Calculation (min) 24 min  -AR      PT Received On 01/05/24  -AR      PT - Next Appointment 01/07/24  -AR                User Key  (r) = Recorded By, (t) = Taken  By, (c) = Cosigned By      Initials Name Provider Type    AR Missy Puente, PT Physical Therapist                  Therapy Charges for Today       Code Description Service Date Service Provider Modifiers Qty    75688060150 HC PT THER PROC EA 15 MIN 1/5/2024 Missy Puente, PT GP 2            PT G-Codes  Outcome Measure Options: AM-PAC 6 Clicks Basic Mobility (PT)  AM-PAC 6 Clicks Score (PT): 20  PT Discharge Summary  Anticipated Discharge Disposition (PT): home with assist, home with home health    Missy Puente PT  1/5/2024

## 2024-01-05 NOTE — PROGRESS NOTES
"    Patient Name: Derian Hendricks  :1934  89 y.o.      Patient Care Team:  Lan De Dios Jr. as PCP - General (Family Medicine)    Chief Complaint: follow up NSTEMI    Interval History: walked today, desatted to 90%, had to be put on 6L NC     Objective   Vital Signs  Temp:  [97.5 °F (36.4 °C)-98.6 °F (37 °C)] 97.8 °F (36.6 °C)  Heart Rate:  [57-63] 58  Resp:  [16-17] 16  BP: (109-119)/(53-60) 112/58    Intake/Output Summary (Last 24 hours) at 2024 0937  Last data filed at 2024 0826  Gross per 24 hour   Intake 480 ml   Output 1000 ml   Net -520 ml     Flowsheet Rows      Flowsheet Row First Filed Value   Admission Height 175.3 cm (69\") Documented at 2023   Admission Weight 98.8 kg (217 lb 14.4 oz) Documented at 2023            Physical Exam:   General Appearance:    Alert, cooperative, in no acute distress   Lungs:     Clear to auscultation.  Normal respiratory effort and rate.      Heart:    Regular rhythm and normal rate, normal S1 and S2, no murmurs, gallops or rubs.     Chest Wall:    No abnormalities observed   Abdomen:     Soft, nontender, positive bowel sounds.     Extremities:   no cyanosis, clubbing or edema.  No marked joint deformities.  Adequate musculoskeletal strength.       Results Review:    Results from last 7 days   Lab Units 24  0257   SODIUM mmol/L 138   POTASSIUM mmol/L 4.7   CHLORIDE mmol/L 108*   CO2 mmol/L 23.7   BUN mg/dL 53*   CREATININE mg/dL 1.72*   GLUCOSE mg/dL 82   CALCIUM mg/dL 8.1*     Results from last 7 days   Lab Units 24  0415   CK TOTAL U/L 45     Results from last 7 days   Lab Units 24  0257   WBC 10*3/mm3 6.30   HEMOGLOBIN g/dL 10.1*   HEMATOCRIT % 31.6*   PLATELETS 10*3/mm3 114*     Results from last 7 days   Lab Units 24  0327 24  0325 24  0415   INR  1.55* 1.70* 2.16*     Results from last 7 days   Lab Units 24  0257   MAGNESIUM mg/dL 2.3     Results from last 7 days   Lab Units " 01/03/24  0327   CHOLESTEROL mg/dL 79   TRIGLYCERIDES mg/dL 55   HDL CHOL mg/dL 31*   LDL CHOL mg/dL 34               Medication Review:   allopurinol, 100 mg, Oral, Daily  aspirin, 81 mg, Oral, Daily  carvedilol, 12.5 mg, Oral, BID With Meals  cefTRIAXone, 1,000 mg, Intravenous, Q24H  furosemide, 40 mg, Intravenous, Q12H  senna-docusate sodium, 2 tablet, Oral, BID  sodium chloride, 10 mL, Intravenous, Q12H         hold, 1 each  hold, 1 each        Assessment & Plan   NSTEMI, etiology unknown but type I suspected.   LBBB, chronicity unknown  Lung mass, unilateral effusion,  prostate mass- malignancy workup deferred by family.   Cardiomyopathy. EF 31-35% with wall motion abnormalities which suggest a chronic process related to remote MI.   Chronic anticoagulation with warfarin, now stopped due to no clear indication  History of CAD  Acute vs chronic kidney disease, baseline unknown    Discussed with family and pt today. No plans for LHC at this time. No further angina.   Continue BB and Lasix per renal.   Will see as needed over the weekend and then again Monday if he is still inpatient. He will need to establish care with a cardiologist closer to home, it appears [facility] has a cardiology practice they are familiar with which I think would be more efficient for them to follow up with      Leana Gaston MD  [facility]  01/05/24  09:37 EST

## 2024-01-05 NOTE — CASE MANAGEMENT/SOCIAL WORK
Continued Stay Note  [facility]     Patient Name: Derian Hendricks  MRN: [id number]  Today's Date: 1/5/2024    Admit Date: 12/29/2023    Plan: Home w/ [facility] [place], [place]; [facility] [employer] with provide oxygen (they will need room air sat, oxygen order and d/c summary mentioning the need for the oxygen sent to them).  Venkat is contact at [employer] ([phone]).  Pt family plans to go by supply company and  few portalbe tanks for Pt transport home.   Discharge Plan       Row Name 01/05/24 1321       Plan    Plan Home w/ [facility] [place], [place] (call [phone] if d/c over weekend); [facility] with provide oxygen (they will need room air sat, oxygen order and d/c summary mentioning the need for the oxygen sent to them). Venkat is contact at [employer] ([phone]). Pt family plans to go by supply company and  few portalbe tanks for Pt transport home.     Plan Comments CCP spoke with Pt, daughter/Janet Tai, son-in-law/Ivan Tai & aj/Dvaian at bedside. Pt gave CCP permission to speak in front of family. Pt plans to d/c home with 24/7 assistance of family. Pt is current with [facility] for nocturnal oxygen ([phone]). CCP confirmed this with Venkat[facility]. Venkat stated if a family member stops by, they will loan them several tanks to use to transport Pt home at d/c. Fax number for [facility] is ([phone]). Per Nicole/[facility] liaison, if Pt d/c over the weekend, RN WILL NEED TO CALL [phone] to advise). CCP following...........Eleni CRESPO/JEANNETTE CM                    Discharge Codes    No documentation.                 Expected Discharge Date and Time       Expected Discharge Date Expected Discharge Time    Jan 6, 2024               Eleni Dominguez RN

## 2024-01-05 NOTE — PLAN OF CARE
Goal Outcome Evaluation:  Plan of Care Reviewed With: patient           Outcome Evaluation: Improved activity tolerance during PT today.  Able to ambulate 200' single UE support on IV pole for ~100', slow shuffling steps, w/ forward flexed posture.  Sp02 to 90% while walking on 4L with some SOB, increaed to 6L during last 50'.  Rn and MD notified.  Recommend sitting in chair few hours/day and ambulating with staff in yu 2-3x/day.      Anticipated Discharge Disposition (PT): home with assist, home with home health

## 2024-01-05 NOTE — PLAN OF CARE
Goal Outcome Evaluation:  Plan of Care Reviewed With: patient           Outcome Evaluation: Patient with no c/o pain this pm. Patient b/p 100-110's/50-60's HR 50-60's remains SR/SB with PVC's. Patient family at bedside. Will continue to monitor and await discharge plans.       Diuretic in Use: lasix 40mg IV q12   Response to Diuretics (Output greater than intake): yes   Daily Weight (up or down): down slightly   O2 Requirements: 3 liters per nasal cannula.   Functional Status (Activity level, tolerance and respiratory symptoms):   Discharge Plans: TBD- await discharge plans

## 2024-01-05 NOTE — PROGRESS NOTES
[facility] INPATIENT PROGRESS NOTE         [facility] CVI    2024      PATIENT IDENTIFICATION:  Name: Derian Hendricks ADMIT: 2023   : 1934  PCP: Lan De Dios Jr.    MRN: [id number] LOS: 7 days   AGE/SEX: 89 y.o. male  ROOM: [facility][note 3]                     LOS 7    Reason for visit: PNA and effusion      SUBJECTIVE:      Sitting up in bedside chair.  No new complaints.  Discussed with multiple family members at bedside.  Plan for thoracentesis today.  Family has refused the bronchoscopy. I am seeing the patient for the first time today.  All patient problems are new to me.      Objective   OBJECTIVE:    Vital Sign Min/Max for last 24 hours  Temp  Min: 97.8 °F (36.6 °C)  Max: 98.6 °F (37 °C)   BP  Min: 109/53  Max: 119/60   Pulse  Min: 57  Max: 63   Resp  Min: 16  Max: 17   SpO2  Min: 94 %  Max: 96 %   No data recorded   Weight  Min: 98.2 kg (216 lb 8 oz)  Max: 98.2 kg (216 lb 8 oz)    Vitals:    24 2352 24 0402 24 0527 24 0826   BP: 109/55 109/53  112/58   BP Location: Right arm Right arm  Right arm   Patient Position: Lying Lying  Lying   Pulse: 63 60  58   Resp: 17 16  16   Temp: 98.6 °F (37 °C) 97.8 °F (36.6 °C)  97.8 °F (36.6 °C)   TempSrc: Oral Oral  Oral   SpO2: 95%   94%   Weight:   98.2 kg (216 lb 8 oz)    Height:                23  0314 24  0527   Weight: 98.8 kg (217 lb 14.4 oz) 98.2 kg (216 lb 8 oz)       Body mass index is 31.97 kg/m².                          Body mass index is 31.97 kg/m².    Intake/Output Summary (Last 24 hours) at 2024 1053  Last data filed at 2024 0826  Gross per 24 hour   Intake 480 ml   Output 1000 ml   Net -520 ml         Exam:  GEN:  No distress, appears stated age  EYES:   PERRL, anicteric sclerae  ENT:    External ears/nose normal, OP clear  NECK:  No adenopathy, midline trachea  LUNGS: Normal chest on inspection, palpation and auscultation  CV:  Normal S1S2, without murmur  ABD:  Nontender,  nondistended, no hepatosplenomegaly, +BS  EXT:  No edema.  No cyanosis or clubbing.  No mottling and normal cap refill.    Assessment     Scheduled meds:  allopurinol, 100 mg, Oral, Daily  aspirin, 81 mg, Oral, Daily  carvedilol, 12.5 mg, Oral, BID With Meals  cefTRIAXone, 1,000 mg, Intravenous, Q24H  furosemide, 40 mg, Intravenous, Q12H  senna-docusate sodium, 2 tablet, Oral, BID  sodium chloride, 10 mL, Intravenous, Q12H      IV meds:                      hold, 1 each  hold, 1 each      Data Review:  Results from last 7 days   Lab Units 01/05/24 0257 01/04/24  0503 01/03/24  0327 01/02/24  0325 01/01/24  0415   SODIUM mmol/L 138 139 139 138 138   POTASSIUM mmol/L 4.7 4.7 5.0 4.9 4.7   CHLORIDE mmol/L 108* 110* 109* 108* 108*   CO2 mmol/L 23.7 20.0* 21.8* 22.0 22.0   BUN mg/dL 53* 50* 54* 49* 48*   CREATININE mg/dL 1.72* 1.89* 1.62* 1.63* 1.83*   GLUCOSE mg/dL 82 100* 107* 78 85   CALCIUM mg/dL 8.1* 7.8* 8.2* 7.8* 8.0*         Estimated Creatinine Clearance: 33.6 mL/min (A) (by C-G formula based on SCr of 1.72 mg/dL (H)).  Results from last 7 days   Lab Units 01/05/24  0257 01/04/24  0503 01/03/24  0327 01/02/24  0325 01/01/24  0415   WBC 10*3/mm3 6.30 6.40 6.49 5.96 6.00   HEMOGLOBIN g/dL 10.1* 10.2* 10.2* 10.4* 10.4*   PLATELETS 10*3/mm3 114* 104* 114* 101* 97*     Results from last 7 days   Lab Units 01/03/24  0327 01/02/24  0325 01/01/24  0415 12/31/23  1005 12/30/23  1423   INR  1.55* 1.70* 2.16* 2.40* 2.80*     Results from last 7 days   Lab Units 01/03/24  0327   ALT (SGPT) U/L 13   AST (SGOT) U/L 17                 Glucose   Date/Time Value Ref Range Status   01/04/2024 0801 100 70 - 130 mg/dL Final         Imaging reviewed  PET reviewed 1/4/24    Microbiology reviewed            Active Hospital Problems    Diagnosis  POA    **Acute respiratory failure with hypoxia [J96.01]  Unknown    Chest pain [R07.9]  Yes    Pleural effusion [J90]  Yes    CKD (chronic kidney disease) [N18.9]  Yes    HTN (hypertension)  [I10]  Unknown    Shortness of breath [R06.02]  Yes    Lung mass [R91.8]  Unknown    Elevated troponin [R79.89]  Unknown    Atrial fibrillation [I48.91]  Unknown    Anemia [D64.9]  Unknown    Thrombocytopenia [D69.6]  Unknown      Resolved Hospital Problems   No resolved problems to display.         ASSESSMENT:  Bilateral pleural effusion: Trace on the left.  Large on the right.  S/p right Thora 1/1/2024 with removal of 1.4 L -> transudate indicative of CHF/volume overload +/- cirrhosis.  Cytology negative.  S/p Thora 1/3 with removal of 2.1 L, cytology pending  Mild pulmonary edema  Bilateral lung masses.  No clear etiology.  Could represent malignancy or benign round atelectasis.  No prior CT to compare.  PET scan showed significantly PET avid masses in the upper lobes bilaterally.  The right lower lobe dependent consolidation like mass is not PET avid.  PET avid prostate nodule  Cirrhosis on CT images with ascites  Borderline mediastinal adenopathies specifically station 4s.  Slightly PET avid.  Acute systolic CHF, EF 30-35%.  Pulmonary HTN, RVSP 45 on echo 12/29 with moderately dilated LA consistent with group 2.  Non-STEMI type I  Anticoagulation with Coumadin.  No clear indication  History of CAD  PIPER  Anemia and thrombocytopenia, unknown chronicity.      PLAN:  Repeat thoracentesis today.  Family and pt refused bronchoscopy.  Plan repeat imaging as out patient for f/u lung mass.  Family wishes to be conservative at his age.  Continue antibiotics. And encourage pulmonary toilet.       Venkat Morrison MD  Pulmonary and Critical Care Medicine  [facility]  1/5/2024    10:53 EST

## 2024-01-05 NOTE — PROGRESS NOTES
"   LOS: 7 days     Chief Complaint/ Reason for encounter: PIPER/CKD, abnormal renal ultrasound    Subjective   01/02/24 : He is doing about the same today, denies complaints  Shortness of breath improved after 1.4 L thoracentesis  Now with lung nodules concerning for possible lung cancer and possible malignant pleural effusion  No edema.  He was started on IV fluids in preparation for heart cath but that is now on hold and his diet has been restarted    1/3 patient had a difficult night with sob. Still feeling sob. Not talking much.     1/4: He is feeling a little better today.  Creatinine up slightly  He reports less shortness of breath after the thoracentesis and with diuresis.  Only about 800 cc of urine recorded  Good appetite, no nausea or vomiting    1/5: No new complaints or events today.  He is a bit less short of breath  Still with some edema  No fevers or chills, no cough  Good appetite without nausea    Medical history reviewed:  History of Present Illness    Subjective    History taken from: Patient and chart    Vital Signs  Temp:  [97.5 °F (36.4 °C)-98.6 °F (37 °C)] 97.8 °F (36.6 °C)  Heart Rate:  [57-63] 58  Resp:  [16-17] 16  BP: (109-119)/(53-60) 112/58       Wt Readings from Last 1 Encounters:   01/05/24 0527 98.2 kg (216 lb 8 oz)   12/29/23 0314 98.8 kg (217 lb 14.4 oz)       Objective:  Vital signs: (most recent): Blood pressure 112/58, pulse 58, temperature 97.8 °F (36.6 °C), temperature source Oral, resp. rate 16, height 175.3 cm (69\"), weight 98.2 kg (216 lb 8 oz), SpO2 94%.                Objective:  General Appearance: Chronically ill-appearing, in no acute distress  HEENT: Mucous membranes moist, no injury, oropharynx clear  Lungs:+ crackles in bases.  Diminished breath sounds  Heart: Normal rate.  Regular rhythm.  S1, S2 normal.  No murmur.   Abdomen: Abdomen is soft.  Bowel sounds are normal, no abdominal tenderness.  There is no rebound or guarding  Extremities: Trace edema of bilateral " lower extremities  Neurological: No focal motor or sensory deficits, pupils reactive  Skin:  Warm and dry.  No rash or cyanosis.       Results Review:    Intake/Output:     Intake/Output Summary (Last 24 hours) at 1/5/2024 0856  Last data filed at 1/5/2024 0826  Gross per 24 hour   Intake 480 ml   Output 1000 ml   Net -520 ml         DATA:  Radiology and Labs:  The following labs independently reviewed by me. Additional labs ordered for tomorrow a.m.  Interval notes, chart personally reviewed by me.   Old records independently reviewed showing unknown CKD.  Awaiting records    Discussed with patient himself at bedside    Risk/ complexity of medical care/ medical decision making moderate complexity, abnormal renal imaging, possible new lung cancer, need for repeat thoracentesis      Labs:   Recent Results (from the past 24 hour(s))   CBC (No Diff)    Collection Time: 01/05/24  2:57 AM    Specimen: Blood   Result Value Ref Range    WBC 6.30 3.40 - 10.80 10*3/mm3    RBC 3.24 (L) 4.14 - 5.80 10*6/mm3    Hemoglobin 10.1 (L) 13.0 - 17.7 g/dL    Hematocrit 31.6 (L) 37.5 - 51.0 %    MCV 97.5 (H) 79.0 - 97.0 fL    MCH 31.2 26.6 - 33.0 pg    MCHC 32.0 31.5 - 35.7 g/dL    RDW 14.4 12.3 - 15.4 %    RDW-SD 51.4 37.0 - 54.0 fl    MPV 11.1 6.0 - 12.0 fL    Platelets 114 (L) 140 - 450 10*3/mm3   Renal Function Panel    Collection Time: 01/05/24  2:57 AM    Specimen: Blood   Result Value Ref Range    Glucose 82 65 - 99 mg/dL    BUN 53 (H) 8 - 23 mg/dL    Creatinine 1.72 (H) 0.76 - 1.27 mg/dL    Sodium 138 136 - 145 mmol/L    Potassium 4.7 3.5 - 5.2 mmol/L    Chloride 108 (H) 98 - 107 mmol/L    CO2 23.7 22.0 - 29.0 mmol/L    Calcium 8.1 (L) 8.6 - 10.5 mg/dL    Albumin 2.3 (L) 3.5 - 5.2 g/dL    Phosphorus 3.5 2.5 - 4.5 mg/dL    Anion Gap 6.3 5.0 - 15.0 mmol/L    BUN/Creatinine Ratio 30.8 (H) 7.0 - 25.0    eGFR 37.5 (L) >60.0 mL/min/1.73   Magnesium    Collection Time: 01/05/24  2:57 AM    Specimen: Blood   Result Value Ref Range     Magnesium 2.3 1.6 - 2.4 mg/dL       Radiology:  Pertinent radiology studies were reviewed as described above      Medications have been reviewed separately in chart overview      ASSESSMENT:  CKD stage IIIb, fairly stable and near baseline, tolerating diuresis well  PIPER, stabilizing    Acute respiratory failure with hypoxia, improved after thoracentesis/diuresis    Chest pain  Bladder lesion    Pleural effusion, possibly malignant    HTN (hypertension), controlled    Shortness of breath    Lung mass, highly concerning for primary malignancy    Elevated troponin    Atrial fibrillation    Anemia    Thrombocytopenia  Bladder mass, not seen on CT.  Family wants to hold off on cystoscopy  New findings of cirrhosis with ascites        DISCUSSION/PLAN:   Renal function seems to be stabilizing, near baseline  Still volume overloaded on exam  He seems to be tolerating the IV diuresis well so far  Will continue IV Lasix with plans to transition to oral Lasix, 40 twice daily at discharge  Repeat thoracentesis planned  BP at goal, on Coreg  Family leaning more toward palliative care and they do not want any additional invasive procedures done at this time including biopsies/bronch/cystoscopy    I think it is reasonable to continue IV diuretics, mainly for comfort at this time    Will follow-up a.m. labs  Overall prognosis seems poor at this point  Discussed with family at bedside  Continue to monitor electrolytes and volume closely       Nate Briggs MD  [facility]   Office phone number: [phone]  Answering service phone number: [phone]    01/05/24  08:56 EST

## 2024-01-05 NOTE — PROGRESS NOTES
[facility] PROGRESS NOTE      Name: Derian Hendricks  Age: 89 y.o.  Sex: male  :  1934  MRN: [id number]    Date: 2024             Interval History: LOS  7     Chief complaint:  No new complaints    Patient appears well. Sitting in chair. Family at bedside. No urologic complaints. CT was reviewed.      - AVSS, good UOP  - WBC - 6.30  - Cr - 1.72 (consistent with baseline)  - PSA - 0.586    - CT - Renal parenchyma is slightly thinned. There is no hydronephrosis. Well-circumscribed fluid density 7 x 4.8 cm lesion at the posteromedial  left upper pole represents a cyst.Slight increased density of the urine within the urinary bladder at 35Hounsfield units. This is consistent with excreted contrast material  from the outside prior chest CT from 2023. No bladder mass is evident.     NM PET/CT - Focal hypermetabolism in the left peripheral prostate may represent  prostatitis versus prostate adenocarcinoma. Correlation with PSA and  further evaluation with prostate MRI as clinically indicated.    Physical Exam:    General Appearance:    Alert, cooperative, NAD, sitting in chair   Abdomen:  :      Soft, NDNT, no masses, no guarding    Bladder nondistended and non-tender   Neuro/Psych:   Orientation intact, mood/affect pleasant       Assessment:    Bladder mass  CKD    Plan:    - CT reviewed and discussed with patient and family  - Discussed outpatient cystoscopy with patient and they seemed on board with plan  - Will have office call to schedule cystoscopy in 3-4 weeks  - If family able to get in elsewhere closer to home, explained to them that they could absolutely call our office to cancel  - Urology to sign-off  - Call for questions, concerns or changes in patient's condition       Ignacia Degroot, APRN  2024  11:52 EST

## 2024-01-05 NOTE — DISCHARGE PLACEMENT REQUEST
"Derian Bermudez (89 y.o. Male)       Date of Birth   06/29/1934    Social Security Number       Address   [address] [place] [address]    Home Phone   [phone]    MRN   [id number]       Amish   None    Marital Status                               Admission Date   12/29/23    Admission Type   Urgent    Admitting Provider       Attending Provider   Leyla Espinal MD    Department, Room/Bed   Psychiatric, 3107/1       Discharge Date       Discharge Disposition       Discharge Destination                                 Attending Provider: Leyla Espinal MD    Allergies: No Known Allergies    Isolation: None   Infection: None   Code Status: CPR    Ht: 175.3 cm (69\")   Wt: 98.2 kg (216 lb 8 oz)    Admission Cmt: None   Principal Problem: Acute respiratory failure with hypoxia [J96.01]                   Active Insurance as of 12/29/2023       Primary Coverage       Payor Plan Insurance Group Employer/Plan Group    MEDICARE MEDICARE A & B        Payor Plan Address Payor Plan Phone Number Payor Plan Fax Number Effective Dates    [address] [phone]  6/1/1999 - None Entered    [place] [address]         Subscriber Name Subscriber Birth Date Member ID       DERIAN BERMUDEZ 6/29/1934 [id number]                     Emergency Contacts        (Rel.) Home Phone Work Phone Mobile Phone    ALBERTO MCKOY (Daughter) [phone] -- --    ZaireJanet (Daughter) [phone] -- [phone]                "

## 2024-01-06 LAB
ALBUMIN SERPL-MCNC: 2.4 G/DL (ref 3.5–5.2)
ANION GAP SERPL CALCULATED.3IONS-SCNC: 10.7 MMOL/L (ref 5–15)
BUN SERPL-MCNC: 54 MG/DL (ref 8–23)
BUN/CREAT SERPL: 32.9 (ref 7–25)
CALCIUM SPEC-SCNC: 7.8 MG/DL (ref 8.6–10.5)
CHLORIDE SERPL-SCNC: 108 MMOL/L (ref 98–107)
CO2 SERPL-SCNC: 23.3 MMOL/L (ref 22–29)
CREAT SERPL-MCNC: 1.64 MG/DL (ref 0.76–1.27)
DEPRECATED RDW RBC AUTO: 53.5 FL (ref 37–54)
EGFRCR SERPLBLD CKD-EPI 2021: 39.7 ML/MIN/1.73
ERYTHROCYTE [DISTWIDTH] IN BLOOD BY AUTOMATED COUNT: 14.6 % (ref 12.3–15.4)
GLUCOSE SERPL-MCNC: 88 MG/DL (ref 65–99)
HCT VFR BLD AUTO: 31.6 % (ref 37.5–51)
HGB BLD-MCNC: 10.7 G/DL (ref 13–17.7)
INR PPP: 1.34 (ref 0.9–1.1)
MCH RBC QN AUTO: 33.4 PG (ref 26.6–33)
MCHC RBC AUTO-ENTMCNC: 33.9 G/DL (ref 31.5–35.7)
MCV RBC AUTO: 98.8 FL (ref 79–97)
PHOSPHATE SERPL-MCNC: 4 MG/DL (ref 2.5–4.5)
PLATELET # BLD AUTO: 114 10*3/MM3 (ref 140–450)
PMV BLD AUTO: 11.3 FL (ref 6–12)
POTASSIUM SERPL-SCNC: 4.5 MMOL/L (ref 3.5–5.2)
PROTHROMBIN TIME: 16.7 SECONDS (ref 11.7–14.2)
RBC # BLD AUTO: 3.2 10*6/MM3 (ref 4.14–5.8)
SODIUM SERPL-SCNC: 142 MMOL/L (ref 136–145)
WBC NRBC COR # BLD AUTO: 6.26 10*3/MM3 (ref 3.4–10.8)

## 2024-01-06 PROCEDURE — 25010000002 FUROSEMIDE PER 20 MG: Performed by: INTERNAL MEDICINE

## 2024-01-06 PROCEDURE — 25010000002 CEFTRIAXONE PER 250 MG: Performed by: INTERNAL MEDICINE

## 2024-01-06 PROCEDURE — 85027 COMPLETE CBC AUTOMATED: CPT | Performed by: STUDENT IN AN ORGANIZED HEALTH CARE EDUCATION/TRAINING PROGRAM

## 2024-01-06 PROCEDURE — 85610 PROTHROMBIN TIME: CPT | Performed by: STUDENT IN AN ORGANIZED HEALTH CARE EDUCATION/TRAINING PROGRAM

## 2024-01-06 PROCEDURE — 80069 RENAL FUNCTION PANEL: CPT | Performed by: INTERNAL MEDICINE

## 2024-01-06 RX ADMIN — CARVEDILOL 12.5 MG: 12.5 TABLET, FILM COATED ORAL at 08:21

## 2024-01-06 RX ADMIN — WARFARIN 4.5 MG: 2.5 TABLET ORAL at 22:09

## 2024-01-06 RX ADMIN — Medication 10 ML: at 08:21

## 2024-01-06 RX ADMIN — CEFTRIAXONE SODIUM 1000 MG: 1 INJECTION, POWDER, FOR SOLUTION INTRAMUSCULAR; INTRAVENOUS at 20:58

## 2024-01-06 RX ADMIN — FUROSEMIDE 40 MG: 10 INJECTION, SOLUTION INTRAMUSCULAR; INTRAVENOUS at 20:58

## 2024-01-06 RX ADMIN — Medication 10 ML: at 21:02

## 2024-01-06 RX ADMIN — ASPIRIN 81 MG: 81 TABLET, COATED ORAL at 08:20

## 2024-01-06 RX ADMIN — FUROSEMIDE 40 MG: 10 INJECTION, SOLUTION INTRAMUSCULAR; INTRAVENOUS at 08:21

## 2024-01-06 RX ADMIN — CARVEDILOL 12.5 MG: 12.5 TABLET, FILM COATED ORAL at 17:10

## 2024-01-06 RX ADMIN — ALLOPURINOL 100 MG: 100 TABLET ORAL at 08:21

## 2024-01-06 NOTE — PROGRESS NOTES
Name: Derian Hendricks ADMIT: 2023   : 1934  PCP: Lan De Dios Jr.    MRN: [id number] LOS: 8 days   AGE/SEX: 89 y.o. male  ROOM: [facility]/     Subjective   Subjective   Laying in bed.  Daughter present in the room.  No acute events overnight.  Patient denies complaints.  Review of Systems   As above  Objective   Objective   Vital Signs  Temp:  [97.5 °F (36.4 °C)-98.5 °F (36.9 °C)] 97.5 °F (36.4 °C)  Heart Rate:  [55-73] 58  Resp:  [16] 16  BP: (111-116)/(49-87) 116/68  SpO2:  [93 %-98 %] 93 %  on  Flow (L/min):  [3] 3;   Device (Oxygen Therapy): nasal cannula  Body mass index is 32.07 kg/m².  Physical Exam    General: Alert, lying in bed, not in distress, chronically ill  HEENT: Normocephalic, atraumatic  CV: Regular rate and rhythm, no murmurs rubs or gallops  Lungs: Decreased, nonlabored breathing, on 2 L nasal cannula  Abdomen: Soft, nontender, nondistended  Extremities: Trace bilateral lower extremity edema,, no cyanosis     Results Review     I reviewed the patient's new clinical results.  Results from last 7 days   Lab Units 24  0352 24  0257 24  0503 24  0327   WBC 10*3/mm3 6.26 6.30 6.40 6.49   HEMOGLOBIN g/dL 10.7* 10.1* 10.2* 10.2*   PLATELETS 10*3/mm3 114* 114* 104* 114*     Results from last 7 days   Lab Units 24  0352 24  0257 24  0503 24  0327   SODIUM mmol/L 142 138 139 139   POTASSIUM mmol/L 4.5 4.7 4.7 5.0   CHLORIDE mmol/L 108* 108* 110* 109*   CO2 mmol/L 23.3 23.7 20.0* 21.8*   BUN mg/dL 54* 53* 50* 54*   CREATININE mg/dL 1.64* 1.72* 1.89* 1.62*   GLUCOSE mg/dL 88 82 100* 107*   Estimated Creatinine Clearance: 35.3 mL/min (A) (by C-G formula based on SCr of 1.64 mg/dL (H)).  Results from last 7 days   Lab Units 24  0352 24  0257 24  0503 24  0327   ALBUMIN g/dL 2.4* 2.3* 2.4* 2.4*   AST (SGOT) U/L  --   --   --  17   ALT (SGPT) U/L  --   --   --  13     Results from last 7 days   Lab Units 24  0352  "01/05/24  0257 01/04/24  0503 01/03/24  0327   CALCIUM mg/dL 7.8* 8.1* 7.8* 8.2*   ALBUMIN g/dL 2.4* 2.3* 2.4* 2.4*   MAGNESIUM mg/dL  --  2.3  --   --    PHOSPHORUS mg/dL 4.0 3.5 3.9 2.9         No results found for: \"COVID19\"  Glucose   Date/Time Value Ref Range Status   01/04/2024 0801 100 70 - 130 mg/dL Final           NM PET/CT Skull Base to Mid Thigh  Narrative: F-18 FDG PET SKULL BASE TO MID THIGH WITH PET CT FUSION.     HISTORY: Bilateral upper lobe masslike consolidations. Right pleural  fluid from 1/1/2024 negative for malignant cells.     TECHNIQUE: Radiation dose reduction techniques were utilized, including  automated exposure control and exposure modulation based on body size.   Blood glucose level at time of injection was 100 mg/dL. 6.5 mCi of F-18  FDG were injected and PET was performed from skull base to mid thigh. CT  was obtained for localization and attenuation correction. Time at  injection 8:00. PET start time 945 a.     Comparison: CT chest 1/2/2024 and 1/1/2024. CT abdomen pelvis 1/3/2024..     FINDINGS:     Head/neck: No suspicious uptake.     Chest: Hypermetabolic bilateral upper lobe areas of masslike  consolidation are unchanged in size from recent CTs. Areas are most  accurately measured on recent CTs. Reference max SUV on the right of 6.5  (series 4/image 135). Reference max SUV on the left of 8.3 (series  4/image 120).     Right lower lobe remains mostly collapsed. Right middle lobe atelectasis  with mild uptake. Mildly hypermetabolic groundglass opacities remain  predominantly throughout the left lower lobe.     Enlarged mediastinal and bihilar lymph nodes. Reference right hilar  lymph node with max SUV of 4.4 (series 4/image 167). Reference left  hilar lymph node with max SUV of 3.3 (series 4/image 161). Reference  approximately 1.1 cm low left paratracheal lymph node with max SUV of 3  (series 4/image 141).     Small left pleural effusion with max SUV of 2.5. Large right " pleural  effusion without associated hypermetabolism, both similar in size to  recent CT.     Bilateral gynecomastia     Abdomen/pelvis: Focal hypermetabolism in the left peripheral prostate,  max SUV of 10.2 (series 4/image 400). No hypermetabolic pelvic lymph  nodes.     Cirrhosis with sequela of portal hypertension with moderate volume  abdominal pelvic ascites.     Bones: No suspicious uptake.        Impression: 1. Hypermetabolic bilateral upper lobe areas of masslike consolidation,  unchanged in size from recent CTs. Differential remains  pneumonitis/pneumonia versus malignancy.  2. Indeterminant mildly hypermetabolic mediastinal and bihilar lymph  nodes. Attention on follow-up.  3. Mildly hypermetabolic left lower lobe predominant groundglass  opacities, likely infectious/inflammatory.  4. Small left pleural effusion has indeterminant mild intrinsic  metabolism. Correlation with cytology as possible.  5. Focal hypermetabolism in the left peripheral prostate may represent  prostatitis versus prostate adenocarcinoma. Correlation with PSA and  further evaluation with prostate MRI as clinically indicated.  6. Cirrhosis with sequela of portal hypertension including moderate  volume abdominal pelvic ascites.        This report was finalized on 1/4/2024 2:32 PM by Dr. Kristopher Redd M.D on Workstation: [id number]       Scheduled Medications  allopurinol, 100 mg, Oral, Daily  aspirin, 81 mg, Oral, Daily  carvedilol, 12.5 mg, Oral, BID With Meals  cefTRIAXone, 1,000 mg, Intravenous, Q24H  furosemide, 40 mg, Intravenous, Q12H  senna-docusate sodium, 2 tablet, Oral, BID  sodium chloride, 10 mL, Intravenous, Q12H    Infusions  hold, 1 each  hold, 1 each    Diet  Diet: Cardiac Diets; Healthy Heart (2-3 Na+); Texture: Regular Texture (IDDSI 7); Fluid Consistency: Thin (IDDSI 0)    I have personally reviewed     [x]  Laboratory   []  Microbiology   [x]  Radiology   []  EKG/Telemetry  []  Cardiology/Vascular   []  Pathology     []  Records       Assessment/Plan     Active Hospital Problems    Diagnosis  POA    **Acute respiratory failure with hypoxia [J96.01]  Unknown    Chest pain [R07.9]  Yes    Pleural effusion [J90]  Yes    CKD (chronic kidney disease) [N18.9]  Yes    HTN (hypertension) [I10]  Unknown    Shortness of breath [R06.02]  Yes    Lung mass [R91.8]  Unknown    Elevated troponin [R79.89]  Unknown    Atrial fibrillation [I48.91]  Unknown    Anemia [D64.9]  Unknown    Thrombocytopenia [D69.6]  Unknown      Resolved Hospital Problems   No resolved problems to display.       Mr. Hendricks is a 89 y.o. male with a history of CHF, CAD, diabetes, CKD, GERD, and HTN was transferred to [facility] from  [facility] for further evaluation and management of chest pain and shortness of breath..    Bilateral pleural effusion:  Large on the right.  S/p right sided thoracentesis status 01/1/2024 1.4 of fluid was removed, fluid studies consistent with transudate.  Cytology was negative for malignant cells.  Repeat thoracentesis performed on the right side on 01/03/2023 on 2.1 L of pleural fluid was removed, repeat cytology   was negative for malignancy   On IV diuretics      Bilateral lung masses.    .  Could represent malignancy or benign round atelectasis.CT chest 01/02/2024-right and unchanged small left pleural effusions. Right lower lobe remains mostly collapsed. Near identical areas of consolidation the bilateral upper lobes. Both pneumonitis/pneumonia (favored given bilaterality) and malignancy remain in the differential.    Initial cytology was negative for left pleural effusion, status post right thoracentesis o1/03/2023 with repeat cytology pending.  PET scan 01/04/2024 for felt hypermetabolic bilateral upper lobe areas of masslike consolidation, unchanged in size from rec  -Family/patient does not want bronchoscopy at this point.  Plan for conservative management  -Will need repeat outpatient imaging to follow-up  lung mass.  F  -Continue IV ceftriaxone for possible pneumonia      Bladder mass/abnormal prostate imaging on PET scan-PET scan showed focal hypermetabolism in the left peripheral prostate may represent prostatitis versus prostate adenocarcinoma.  Urinalysis was normal on admission.  PSA normal.  Urology evaluated.  Recommend outpatient cystoscopy.  Follow-up with urology in 3 to 4 weeks.        Acute systolic heart failure/NSTEMI- suspect type 1 per cardiology.  Echo 12/29/2023 showed EF of 31-35,  hypokinetic mid anterior, mid inferolateral and mid inferoseptal walls. The following left ventricular wall segments are akinetic, apical anterior, apical lateral, apical inferior, apical septal and apex. -on asa and Coreg.  No plans for heart cath.  Continue medical management with aspirin, statin, carvedilol.  On IV Lasix.      Hypertension: Blood pressure stable.  Monitor.    PIPER on CKD-Stage 3? Cr was on 12/29/2023 1.5 on admission.  no prior creatine available prior to admission.  Nephrology following.  On IV Lasix.  Monitor daily BMP.  Creatinine improved today.      On warfarin at home- etiology unclear, currently on hold.  Dicussed with Daughter, reports he has been on it for years since the heart attack in 1989 and nobody took him off of it, but no history of blood clot, pulmonary embolism, irregular heartbeat.  Resume if no procedures planned.    Thrombocytopenia: Stable.  Monitor.      Anemia of chronic disease: Iron panel 01//04/2023 consistent with anemia of chronic disease.  Hemoglobin stable.  Monitor.        SCDs for DVT prophylaxis.  Full code.  Discussed with patient.  Discussed with family  Anticipate discharge home with home health once cleared by consultants, likely in 1-2 days, may need home oxygen.    Copied text in this note has been reviewed and is accurate as of 01/06/24.         Dictated utilizing Dragon dictation        Leyla Espinal MD  [facility]  01/06/24  12:02  EST

## 2024-01-06 NOTE — PROGRESS NOTES
"   LOS: 8 days     Chief Complaint/ Reason for encounter: PIPER/CKD, abnormal renal ultrasound    Subjective   01/02/24 : He is doing about the same today, denies complaints  Shortness of breath improved after 1.4 L thoracentesis  Now with lung nodules concerning for possible lung cancer and possible malignant pleural effusion  No edema.  He was started on IV fluids in preparation for heart cath but that is now on hold and his diet has been restarted    1/3 patient had a difficult night with sob. Still feeling sob. Not talking much.     1/4: He is feeling a little better today.  Creatinine up slightly  He reports less shortness of breath after the thoracentesis and with diuresis.  Only about 800 cc of urine recorded  Good appetite, no nausea or vomiting    1/5: No new complaints or events today.  He is a bit less short of breath  Still with some edema  No fevers or chills, no cough  Good appetite without nausea    1/6 patient reports he is feeling better. Sob has improved. Per patient and daughter, his edema has improved as well.     Medical history reviewed:  History of Present Illness    Subjective    History taken from: Patient and chart    Vital Signs  Temp:  [97.7 °F (36.5 °C)-98.5 °F (36.9 °C)] 98.5 °F (36.9 °C)  Heart Rate:  [55-73] 68  Resp:  [16] 16  BP: (111-116)/(49-87) 111/87       Wt Readings from Last 1 Encounters:   01/06/24 0639 98.5 kg (217 lb 3.2 oz)   01/05/24 0527 98.2 kg (216 lb 8 oz)   12/29/23 0314 98.8 kg (217 lb 14.4 oz)       Objective:  Vital signs: (most recent): Blood pressure 116/68, pulse 58, temperature 97.5 °F (36.4 °C), temperature source Oral, resp. rate 16, height 175.3 cm (69\"), weight 98.5 kg (217 lb 3.2 oz), SpO2 93%.                Objective:  General Appearance: Chronically ill-appearing, in no acute distress. Lookd better  HEENT: Mucous membranes moist, no injury, oropharynx clear  Lungs: Diminished breath sounds but clear  Heart: Normal rate.  Regular rhythm.  S1, S2 normal.  " No murmur.   Abdomen: Abdomen is soft.  Bowel sounds are normal, no abdominal tenderness.  There is no rebound or guarding  Extremities: Trace edema of bilateral lower extremities  Neurological: No focal motor or sensory deficits, pupils reactive  Skin:  Warm and dry.  No rash or cyanosis.       Results Review:    Intake/Output:     Intake/Output Summary (Last 24 hours) at 1/6/2024 0723  Last data filed at 1/6/2024 0639  Gross per 24 hour   Intake 120 ml   Output 550 ml   Net -430 ml         DATA:  Radiology and Labs:  The following labs independently reviewed by me. Additional labs ordered for tomorrow a.m.  Interval notes, chart personally reviewed by me.   Old records independently reviewed showing unknown CKD.  Awaiting records    Discussed with patient himself at bedside    Risk/ complexity of medical care/ medical decision making moderate complexity, abnormal renal imaging, possible new lung cancer, need for repeat thoracentesis      Labs:   Recent Results (from the past 24 hour(s))   CBC (No Diff)    Collection Time: 01/06/24  3:52 AM    Specimen: Blood   Result Value Ref Range    WBC 6.26 3.40 - 10.80 10*3/mm3    RBC 3.20 (L) 4.14 - 5.80 10*6/mm3    Hemoglobin 10.7 (L) 13.0 - 17.7 g/dL    Hematocrit 31.6 (L) 37.5 - 51.0 %    MCV 98.8 (H) 79.0 - 97.0 fL    MCH 33.4 (H) 26.6 - 33.0 pg    MCHC 33.9 31.5 - 35.7 g/dL    RDW 14.6 12.3 - 15.4 %    RDW-SD 53.5 37.0 - 54.0 fl    MPV 11.3 6.0 - 12.0 fL    Platelets 114 (L) 140 - 450 10*3/mm3   Renal Function Panel    Collection Time: 01/06/24  3:52 AM    Specimen: Blood   Result Value Ref Range    Glucose 88 65 - 99 mg/dL    BUN 54 (H) 8 - 23 mg/dL    Creatinine 1.64 (H) 0.76 - 1.27 mg/dL    Sodium 142 136 - 145 mmol/L    Potassium 4.5 3.5 - 5.2 mmol/L    Chloride 108 (H) 98 - 107 mmol/L    CO2 23.3 22.0 - 29.0 mmol/L    Calcium 7.8 (L) 8.6 - 10.5 mg/dL    Albumin 2.4 (L) 3.5 - 5.2 g/dL    Phosphorus 4.0 2.5 - 4.5 mg/dL    Anion Gap 10.7 5.0 - 15.0 mmol/L     BUN/Creatinine Ratio 32.9 (H) 7.0 - 25.0    eGFR 39.7 (L) >60.0 mL/min/1.73       Radiology:  Pertinent radiology studies were reviewed as described above      Medications have been reviewed separately in chart overview      ASSESSMENT:  CKD stage IIIb, fairly stable and near baseline, tolerating diuresis well  PIPER, stabilizing    Acute respiratory failure with hypoxia, improved after thoracentesis/diuresis    Chest pain  Bladder lesion    Pleural effusion, possibly malignant    HTN (hypertension), controlled    Shortness of breath    Lung mass, highly concerning for primary malignancy    Elevated troponin    Atrial fibrillation    Anemia    Thrombocytopenia  Bladder mass, not seen on CT.  Family wants to hold off on cystoscopy  New findings of cirrhosis with ascites        DISCUSSION/PLAN:   Renal function stabilizing, near baseline  Still volume overloaded on exam, in part due to low albumin  He seems to be tolerating the IV diuresis well so far  Will continue IV Lasix with plans to transition to oral Lasix, 40 twice daily at discharge  BP at goal, on Coreg  Family does not want any additional invasive procedures done at this time including biopsies/bronch/cystoscopy    I think it is reasonable to continue IV diuretics, mainly for comfort at this time    Will follow-up a.m. labs  Overall prognosis seems poor at this point  Discussed with family at bedside  Continue to monitor electrolytes and volume closely       Janice Shay MD  [facility]   Office phone number: [phone]  Answering service phone number: [phone]    01/06/24  07:23 EST

## 2024-01-06 NOTE — PROGRESS NOTES
[facility] INPATIENT PROGRESS NOTE         [facility]    2024      PATIENT IDENTIFICATION:  Name: Derian Hendricks ADMIT: 2023   : 1934  PCP: Lan De Dios Jr.    MRN: [id number] LOS: 8 days   AGE/SEX: 89 y.o. male  ROOM: [facility]                     LOS 8    Reason for visit: PNA and effusion      SUBJECTIVE:      Resting comfortably.  No new complaints.  Discussed with family at bedside.  On 2 L supplemental oxygen.  Diminished at bases but otherwise clear.      Objective   OBJECTIVE:    Vital Sign Min/Max for last 24 hours  Temp  Min: 97.5 °F (36.4 °C)  Max: 98.5 °F (36.9 °C)   BP  Min: 111/87  Max: 116/68   Pulse  Min: 55  Max: 73   Resp  Min: 16  Max: 16   SpO2  Min: 93 %  Max: 98 %   No data recorded   Weight  Min: 98.5 kg (217 lb 3.2 oz)  Max: 98.5 kg (217 lb 3.2 oz)    Vitals:    24 0422 24 0639 24 0734 24 0820   BP: 111/87  116/68    BP Location: Right arm  Right arm    Patient Position: Lying  Sitting    Pulse:   66 58   Resp: 16  16    Temp: 98.5 °F (36.9 °C)  97.5 °F (36.4 °C)    TempSrc: Oral  Oral    SpO2:   93%    Weight:  98.5 kg (217 lb 3.2 oz)     Height:                23  0314 24  0527 24  0639   Weight: 98.8 kg (217 lb 14.4 oz) 98.2 kg (216 lb 8 oz) 98.5 kg (217 lb 3.2 oz)       Body mass index is 32.07 kg/m².                          Body mass index is 32.07 kg/m².    Intake/Output Summary (Last 24 hours) at 2024 0953  Last data filed at 2024 0734  Gross per 24 hour   Intake 480 ml   Output 550 ml   Net -70 ml         Exam:  GEN:  No distress, appears stated age  NECK:  No adenopathy, midline trachea  LUNGS: Normal chest on inspection, palpation and diminished at bases on auscultation  CV:  Normal S1S2, without murmur      Assessment     Scheduled meds:  allopurinol, 100 mg, Oral, Daily  aspirin, 81 mg, Oral, Daily  carvedilol, 12.5 mg, Oral, BID With Meals  cefTRIAXone, 1,000 mg, Intravenous,  Q24H  furosemide, 40 mg, Intravenous, Q12H  senna-docusate sodium, 2 tablet, Oral, BID  sodium chloride, 10 mL, Intravenous, Q12H      IV meds:                      hold, 1 each  hold, 1 each      Data Review:  Results from last 7 days   Lab Units 01/06/24  0352 01/05/24  0257 01/04/24  0503 01/03/24  0327 01/02/24  0325   SODIUM mmol/L 142 138 139 139 138   POTASSIUM mmol/L 4.5 4.7 4.7 5.0 4.9   CHLORIDE mmol/L 108* 108* 110* 109* 108*   CO2 mmol/L 23.3 23.7 20.0* 21.8* 22.0   BUN mg/dL 54* 53* 50* 54* 49*   CREATININE mg/dL 1.64* 1.72* 1.89* 1.62* 1.63*   GLUCOSE mg/dL 88 82 100* 107* 78   CALCIUM mg/dL 7.8* 8.1* 7.8* 8.2* 7.8*         Estimated Creatinine Clearance: 35.3 mL/min (A) (by C-G formula based on SCr of 1.64 mg/dL (H)).  Results from last 7 days   Lab Units 01/06/24  0352 01/05/24  0257 01/04/24  0503 01/03/24  0327 01/02/24  0325   WBC 10*3/mm3 6.26 6.30 6.40 6.49 5.96   HEMOGLOBIN g/dL 10.7* 10.1* 10.2* 10.2* 10.4*   PLATELETS 10*3/mm3 114* 114* 104* 114* 101*     Results from last 7 days   Lab Units 01/03/24  0327 01/02/24  0325 01/01/24  0415 12/31/23  1005 12/30/23  1423   INR  1.55* 1.70* 2.16* 2.40* 2.80*     Results from last 7 days   Lab Units 01/03/24  0327   ALT (SGPT) U/L 13   AST (SGOT) U/L 17                 Glucose   Date/Time Value Ref Range Status   01/04/2024 0801 100 70 - 130 mg/dL Final         Imaging reviewed  PET reviewed 1/4/24    Microbiology reviewed            Active Hospital Problems    Diagnosis  POA    **Acute respiratory failure with hypoxia [J96.01]  Unknown    Chest pain [R07.9]  Yes    Pleural effusion [J90]  Yes    CKD (chronic kidney disease) [N18.9]  Yes    HTN (hypertension) [I10]  Unknown    Shortness of breath [R06.02]  Yes    Lung mass [R91.8]  Unknown    Elevated troponin [R79.89]  Unknown    Atrial fibrillation [I48.91]  Unknown    Anemia [D64.9]  Unknown    Thrombocytopenia [D69.6]  Unknown      Resolved Hospital Problems   No resolved problems to display.          ASSESSMENT:  Bilateral pleural effusion: Trace on the left.  Large on the right.  S/p right Thora 1/1/2024 with removal of 1.4 L -> transudate indicative of CHF/volume overload +/- cirrhosis.  Cytology negative.  S/p Thora 1/3 with removal of 2.1 L, cytology pending  Mild pulmonary edema  Bilateral lung masses.  No clear etiology.  Could represent malignancy or benign round atelectasis.  No prior CT to compare.  PET scan showed significantly PET avid masses in the upper lobes bilaterally.  The right lower lobe dependent consolidation like mass is not PET avid.  PET avid prostate nodule  Cirrhosis on CT images with ascites  Borderline mediastinal adenopathies specifically station 4s.  Slightly PET avid.  Acute systolic CHF, EF 30-35%.  Pulmonary HTN, RVSP 45 on echo 12/29 with moderately dilated LA consistent with group 2.  Non-STEMI type I  Anticoagulation with Coumadin.  No clear indication  History of CAD  PIPER  Anemia and thrombocytopenia, unknown chronicity.      PLAN:  Status post thoracentesis.  Family and pt refused bronchoscopy.  Plan repeat imaging as out patient for f/u lung mass.  Family wishes to be conservative at his age.  Continue antibiotics. And encourage pulmonary toilet.   Following peripherally for pulmonary issues.  Defer all other medical management to hospitalist service.      Venkat Morrison MD  Pulmonary and Critical Care Medicine  [facility], [facility]  1/6/2024    09:53 EST

## 2024-01-06 NOTE — NURSING NOTE
MET WITH PT'S DAUGHTER. SE JUST WANTED SOME INFORMATION ON HOSPICE FOR WHEN THEY ARE READY. DAUGHTER WILL CONTACT HOSPICE ONCE PT IS HOME AND READY FOR SERVICES. UPDATED NURSE CINDY.     PT IS OUT OF [facility] SERVICE AREA.           THANK YOU  RHINA THOMPSON RN, [facility]  [phone]

## 2024-01-07 ENCOUNTER — READMISSION MANAGEMENT (OUTPATIENT)
Dept: CALL CENTER | Facility: HOSPITAL | Age: 89
End: 2024-01-07
Payer: MEDICARE

## 2024-01-07 VITALS
OXYGEN SATURATION: 96 % | SYSTOLIC BLOOD PRESSURE: 125 MMHG | HEART RATE: 62 BPM | BODY MASS INDEX: 32.05 KG/M2 | HEIGHT: 69 IN | WEIGHT: 216.4 LBS | RESPIRATION RATE: 16 BRPM | TEMPERATURE: 97.8 F | DIASTOLIC BLOOD PRESSURE: 55 MMHG

## 2024-01-07 LAB
ALBUMIN SERPL-MCNC: 2.4 G/DL (ref 3.5–5.2)
ANION GAP SERPL CALCULATED.3IONS-SCNC: 8.5 MMOL/L (ref 5–15)
BUN SERPL-MCNC: 53 MG/DL (ref 8–23)
BUN/CREAT SERPL: 33.1 (ref 7–25)
CALCIUM SPEC-SCNC: 8.3 MG/DL (ref 8.6–10.5)
CHLORIDE SERPL-SCNC: 105 MMOL/L (ref 98–107)
CO2 SERPL-SCNC: 24.5 MMOL/L (ref 22–29)
CREAT SERPL-MCNC: 1.6 MG/DL (ref 0.76–1.27)
DEPRECATED RDW RBC AUTO: 52 FL (ref 37–54)
EGFRCR SERPLBLD CKD-EPI 2021: 40.9 ML/MIN/1.73
ERYTHROCYTE [DISTWIDTH] IN BLOOD BY AUTOMATED COUNT: 14.6 % (ref 12.3–15.4)
GLUCOSE SERPL-MCNC: 90 MG/DL (ref 65–99)
HCT VFR BLD AUTO: 33.7 % (ref 37.5–51)
HGB BLD-MCNC: 11.1 G/DL (ref 13–17.7)
INR PPP: 1.3 (ref 0.9–1.1)
MCH RBC QN AUTO: 32.2 PG (ref 26.6–33)
MCHC RBC AUTO-ENTMCNC: 32.9 G/DL (ref 31.5–35.7)
MCV RBC AUTO: 97.7 FL (ref 79–97)
PHOSPHATE SERPL-MCNC: 4.4 MG/DL (ref 2.5–4.5)
PLATELET # BLD AUTO: 134 10*3/MM3 (ref 140–450)
PMV BLD AUTO: 11.2 FL (ref 6–12)
POTASSIUM SERPL-SCNC: 4.3 MMOL/L (ref 3.5–5.2)
PROTHROMBIN TIME: 16.4 SECONDS (ref 11.7–14.2)
RBC # BLD AUTO: 3.45 10*6/MM3 (ref 4.14–5.8)
SODIUM SERPL-SCNC: 138 MMOL/L (ref 136–145)
WBC NRBC COR # BLD AUTO: 7.01 10*3/MM3 (ref 3.4–10.8)

## 2024-01-07 PROCEDURE — 25010000002 FUROSEMIDE PER 20 MG: Performed by: INTERNAL MEDICINE

## 2024-01-07 PROCEDURE — 85610 PROTHROMBIN TIME: CPT | Performed by: STUDENT IN AN ORGANIZED HEALTH CARE EDUCATION/TRAINING PROGRAM

## 2024-01-07 PROCEDURE — 80069 RENAL FUNCTION PANEL: CPT | Performed by: INTERNAL MEDICINE

## 2024-01-07 PROCEDURE — 85027 COMPLETE CBC AUTOMATED: CPT | Performed by: STUDENT IN AN ORGANIZED HEALTH CARE EDUCATION/TRAINING PROGRAM

## 2024-01-07 PROCEDURE — 94618 PULMONARY STRESS TESTING: CPT

## 2024-01-07 RX ORDER — ASPIRIN 81 MG/1
81 TABLET ORAL DAILY
Qty: 30 TABLET | Refills: 0 | Status: SHIPPED | OUTPATIENT
Start: 2024-01-08 | End: 2024-02-07

## 2024-01-07 RX ORDER — FUROSEMIDE 40 MG/1
40 TABLET ORAL 2 TIMES DAILY
Qty: 60 TABLET | Refills: 0 | Status: SHIPPED | OUTPATIENT
Start: 2024-01-07 | End: 2024-02-06

## 2024-01-07 RX ORDER — CEFDINIR 300 MG/1
300 CAPSULE ORAL 2 TIMES DAILY
Qty: 4 CAPSULE | Refills: 0 | Status: SHIPPED | OUTPATIENT
Start: 2024-01-07 | End: 2024-01-09

## 2024-01-07 RX ORDER — CARVEDILOL 12.5 MG/1
12.5 TABLET ORAL 2 TIMES DAILY WITH MEALS
Qty: 60 TABLET | Refills: 0 | Status: SHIPPED | OUTPATIENT
Start: 2024-01-07 | End: 2024-02-06

## 2024-01-07 RX ADMIN — FUROSEMIDE 40 MG: 10 INJECTION, SOLUTION INTRAMUSCULAR; INTRAVENOUS at 09:53

## 2024-01-07 RX ADMIN — Medication 10 ML: at 10:16

## 2024-01-07 RX ADMIN — ALLOPURINOL 100 MG: 100 TABLET ORAL at 09:52

## 2024-01-07 RX ADMIN — CARVEDILOL 12.5 MG: 12.5 TABLET, FILM COATED ORAL at 09:52

## 2024-01-07 RX ADMIN — ASPIRIN 81 MG: 81 TABLET, COATED ORAL at 09:52

## 2024-01-07 NOTE — THERAPY EVALUATION
Exercise Oximetry    Patient Name:Derian Hendricks   MRN: [id number]   Date: 01/07/24             ROOM AIR BASELINE   SpO2%      90   Heart Rate    Blood Pressure      EXERCISE ON ROOM AIR SpO2% EXERCISE ON O2 @  2 LPM SpO2%   1 MINUTE     88 1 MINUTE    2 MINUTES     87 2 MINUTES    3 MINUTES     85 3 MINUTES    4 MINUTES  4 MINUTES      90   5 MINUTES  5 MINUTES      91   6 MINUTES  6 MINUTES       92              Distance Walked   Distance Walked   Dyspnea (Evans Scale)   Dyspnea (Evans Scale)   Fatigue (Evans Scale)   Fatigue (Evans Scale)   SpO2% Post Exercise  SpO2% Post Exercise   HR Post Exercise   HR Post Exercise   Time to Recovery   Time to Recovery     Comments:     Patient walked well in halls with no issues. Needed 2L to complete walk due to de sat    Thank you   Ignacia KENNY

## 2024-01-07 NOTE — DISCHARGE SUMMARY
Patient Name: Derian Hendricks  : 1934  MRN: [id number]    Date of Admission: 2023  Date of Discharge:  2024  Primary Care Physician: Lan De Dios Jr.      Chief Complaint:   No chief complaint on file.      Discharge Diagnoses     Active Hospital Problems    Diagnosis  POA   • **Acute respiratory failure with hypoxia [J96.01]  Unknown   • Chest pain [R07.9]  Yes   • Pleural effusion [J90]  Yes   • CKD (chronic kidney disease) [N18.9]  Yes   • HTN (hypertension) [I10]  Unknown   • Shortness of breath [R06.02]  Yes   • Lung mass [R91.8]  Unknown   • Elevated troponin [R79.89]  Unknown   • Atrial fibrillation [I48.91]  Unknown   • Anemia [D64.9]  Unknown   • Thrombocytopenia [D69.6]  Unknown      Resolved Hospital Problems   No resolved problems to display.        Hospital Course     Mr. Hendricks is a 89 y.o. male with a history of CHF, CAD, diabetes, CKD, GERD, and HTN was transferred to [facility] from  [facility] for further evaluation and management of chest pain and shortness of breath..     Acute hypoxic respiratory failure/bilateral pleural effusion:  Large on the right.  S/p right sided thoracentesis status 2024 1.4 of fluid was removed, fluid studies consistent with transudate.  Cytology was negative for malignant cells.  Repeat thoracentesis performed on the right side on 2023 on 2.1 L of pleural fluid was removed, repeat cytology   was also negative for malignancy.  Was treated with IV diuretics during hospitalization, transitioned to oral Lasix 40 mg twice daily per nephrology recommendations at discharge.  Patient was able to be weaned down to room air at rest.  However had walking oximetry performed, and did require oxygen with exertion.  Placed order to continue 2 L nasal cannula oxygen with exertion at patient also on nightly oxygen 1 L at baseline.        Bilateral lung masses.    Could represent malignancy or benign round atelectasis.CT chest  01/02/2024-right and unchanged small left pleural effusions. Right lower lobe remains mostly collapsed. Near identical areas of consolidation the bilateral upper lobes.Both pneumonitis/pneumonia (favored given bilaterality) and malignancy remain in the differential. Initial cytology was negative for left pleural effusion, status post right thoracentesis o1/03/2023 with repeat cytology pending.PET scan 01/04/2024 for felt hypermetabolic bilateral upper lobe areas of masslike consolidation.  Pulmonology evaluated recommended bronchoscopy, however family/patient did not want bronchoscopy at this point.  Plan for conservative management Will need repeat outpatient imaging to follow-up lung mass.  Follow-up with PCP, repeat CT of the chest in 2 months.          Bladder mass/abnormal prostate imaging on PET scan-PET scan showed focal hypermetabolism in the left peripheral prostate may represent prostatitis versus prostate adenocarcinoma.  Urinalysis was normal on admission.  PSA normal.  Urology evaluated.  Recommend outpatient cystoscopy.  Follow-up with urology in 3 to 4 weeks.           Acute systolic heart failure/NSTEMI-cardiology evaluated.  Suspected type 1 per cardiology.  Echo 12/29/2023 showed EF of 31-35,  hypokinetic mid anterior, mid inferolateral and mid inferoseptal walls. The following left ventricular wall segments are akinetic, apical anterior, apical lateral, apical inferior, apical septal and apex.  Was initiated on aspirin, and Coreg per cardiology, continued at discharge.  Discharged on p.o. Lasix 40 mg twice daily.             PIPER on CKD-Stage 3? Cr was on 12/29/2023 1.5 on admission.  no prior creatine available prior to admission.  Nephrology evaluated.  Was treated with IV Lasix, creatinine peaked at 1.99, but then improved and was 1.60 prior to discharge.  Continue p.o. Lasix 40 m twice daily.  Repeat BMP     On warfarin at home- etiology unclear, currently on hold.  Dicussed with Daughter,  reports he has been on it for years since the heart attack in 1989 and nobody took him off of it.  Cardiology recommended to discontinue warfarin as no clear indication.  Warfarin discontinued at discharge.  Initiated on aspirin for NSTEMI      At the time of discharge patient was told to take all medications as prescribed, keep all follow-up appointments, and call their doctor or return to the hospital with any worsening or concerning symptoms.                  Day of Discharge     Subjective:  Patient seen this morning.  Sitting out of the side of the bed, eating breakfast.  Family present at bedside.  Patient denies any specific symptoms.  No shortness of breath, chest pain, fevers, chills.  Family and patient want to be discharged home today.      Physical Exam:  Temp:  [98 °F (36.7 °C)-98.9 °F (37.2 °C)] 98 °F (36.7 °C)  Heart Rate:  [61-68] 64  Resp:  [16] 16  BP: (106-122)/(47-66) 110/66  Body mass index is 31.96 kg/m².  Physical Exam    General: Alert sitting up at the side of the bed, not in distress,  HEENT: Normocephalic, atraumatic  CV: Regular rate and rhythm, no murmurs rubs or gallops  Lungs: Right lower lung field mild crackles, otherwise CTA, no wheezing, nonlabored breathing.  Abdomen: Soft, nontender, nondistended  Extremities: No significant peripheral edema , no cyanosis     Consultants     Consult Orders (all) (From admission, onward)       Start     Ordered    01/04/24 1612  Inpatient Hospice / [facility] Consult  Once        Specialty:  Hospice and Palliative Medicine  Provider:  (Not yet assigned)    01/04/24 1612    01/04/24 1216  Inpatient Case Management  Consult  Once        Provider:  (Not yet assigned)    01/04/24 1215    01/03/24 1132  Inpatient Palliative Care Team Consult  Once        Provider:  (Not yet assigned)    01/03/24 1131    01/02/24 1224  Inpatient Urology Consult  Once        Specialty:  Urology  Provider:  Miko Toney MD    01/02/24 1224     01/01/24 1322  Inpatient Nephrology Consult  Once        Specialty:  Nephrology  Provider:  Nate Briggs MD    01/01/24 1321    12/29/23 0702  Inpatient Cardiology Consult  IN AM        Specialty:  Cardiology  Provider:  Gregg Zacarias Jr., MD    12/29/23 0340    12/29/23 0702  Inpatient Pulmonology Consult  IN AM        Specialty:  Pulmonary Disease  Provider:  Eleazar Clark MD    12/29/23 0340                  Procedures     * Surgery not found *      Imaging Results (All)       Procedure Component Value Units Date/Time    NM PET/CT Skull Base to Mid Thigh [[id number]] Collected: 01/04/24 1424     Updated: 01/04/24 1436    Narrative:      F-18 FDG PET SKULL BASE TO MID THIGH WITH PET CT FUSION.     HISTORY: Bilateral upper lobe masslike consolidations. Right pleural  fluid from 1/1/2024 negative for malignant cells.     TECHNIQUE: Radiation dose reduction techniques were utilized, including  automated exposure control and exposure modulation based on body size.   Blood glucose level at time of injection was 100 mg/dL. 6.5 mCi of F-18  FDG were injected and PET was performed from skull base to mid thigh. CT  was obtained for localization and attenuation correction. Time at  injection 8:00. PET start time 945 a.     Comparison: CT chest 1/2/2024 and 1/1/2024. CT abdomen pelvis 1/3/2024..     FINDINGS:     Head/neck: No suspicious uptake.     Chest: Hypermetabolic bilateral upper lobe areas of masslike  consolidation are unchanged in size from recent CTs. Areas are most  accurately measured on recent CTs. Reference max SUV on the right of 6.5  (series 4/image 135). Reference max SUV on the left of 8.3 (series  4/image 120).     Right lower lobe remains mostly collapsed. Right middle lobe atelectasis  with mild uptake. Mildly hypermetabolic groundglass opacities remain  predominantly throughout the left lower lobe.     Enlarged mediastinal and bihilar lymph nodes. Reference right hilar  lymph node with  max SUV of 4.4 (series 4/image 167). Reference left  hilar lymph node with max SUV of 3.3 (series 4/image 161). Reference  approximately 1.1 cm low left paratracheal lymph node with max SUV of 3  (series 4/image 141).     Small left pleural effusion with max SUV of 2.5. Large right pleural  effusion without associated hypermetabolism, both similar in size to  recent CT.     Bilateral gynecomastia     Abdomen/pelvis: Focal hypermetabolism in the left peripheral prostate,  max SUV of 10.2 (series 4/image 400). No hypermetabolic pelvic lymph  nodes.     Cirrhosis with sequela of portal hypertension with moderate volume  abdominal pelvic ascites.     Bones: No suspicious uptake.          Impression:      1. Hypermetabolic bilateral upper lobe areas of masslike consolidation,  unchanged in size from recent CTs. Differential remains  pneumonitis/pneumonia versus malignancy.  2. Indeterminant mildly hypermetabolic mediastinal and bihilar lymph  nodes. Attention on follow-up.  3. Mildly hypermetabolic left lower lobe predominant groundglass  opacities, likely infectious/inflammatory.  4. Small left pleural effusion has indeterminant mild intrinsic  metabolism. Correlation with cytology as possible.  5. Focal hypermetabolism in the left peripheral prostate may represent  prostatitis versus prostate adenocarcinoma. Correlation with PSA and  further evaluation with prostate MRI as clinically indicated.  6. Cirrhosis with sequela of portal hypertension including moderate  volume abdominal pelvic ascites.        This report was finalized on 1/4/2024 2:32 PM by Dr. Kristopher Redd M.D on Workstation: [id number]       US Thoracentesis [[id number]] Collected: 01/03/24 1610    Specimen: Body Fluid Updated: 01/03/24 1614    Narrative:      ULTRASOUND-GUIDED RIGHT THORACENTESIS     HISTORY: Pleural effusion     After signed informed consent was obtained the patient was prepped and  draped in the usual sterile fashion with 2%  chlorhexidine. Lidocaine was  used for local anesthesia.     Ultrasound guidance was used to place a 5 Welsh catheter into the right  pleural effusion. 2.1 L of pleural fluid was removed. Sample was sent to  the lab.     Confirmatory images were obtained.     Patient tolerated the procedure well with no complications.       Impression:      Ultrasound-guided right thoracentesis as described              This report was finalized on 1/3/2024 4:11 PM by Dr. Nate Toney M.D  on Workstation: [id number]       CT Abdomen Pelvis Without Contrast [[id number]] Collected: 01/03/24 1058     Updated: 01/03/24 1116    Narrative:      ABDOMEN AND PELVIS CT WITHOUT CONTRAST     HISTORY: Evaluate bladder mass, sepsis.     TECHNIQUE: Noncontrast abdomen and pelvis CT correlated with chest CT  01/02/2024 and outside prior abdomen and pelvis CT 12/28/2023.     FINDINGS: Gynecomastia with large volume right pleural effusion and  small volume left pleural effusion again observed. Large volume of  ascites which was partially visualized on the chest CT is demonstrated  more fully on today's exam.     There is cirrhotic morphology of the liver which is quite small in  volume. The spleen is enlarged measuring about 13 x 7.5 cm axial and  almost 18 cm oblique coronal dimension. Parenchyma of the pancreas and  the adrenals has a normal noncontrast CT appearance. Bulky collateral  vascularity in the upper abdomen around the lesser curve of the stomach.     Renal parenchyma is slightly thinned. There is no hydronephrosis.  Well-circumscribed fluid density 7 x 4.8 cm lesion at the posteromedial  left upper pole represents a cyst.     Slight increased density of the urine within the urinary bladder at 35  Hounsfield units. This is consistent with excreted contrast material  from the outside prior chest CT from 12/28/2023. No bladder mass is  evident on today's exam.     No abdominal or pelvic lymphadenopathy. Diverticulosis throughout  the  colon. No abnormal-appearing bowel.     Chronic ankylosis across the sacroiliac joints. Degenerative change at  the hips. No compression deformity in the visualized lumbar or lower  thoracic spine.     Radiation dose reduction techniques were utilized, including automated  exposure control and exposure modulation based on body size.       Impression:      Large volume of ascites with pronounced cirrhotic morphology  of the liver and splenomegaly as well as gynecomastia and bilateral  pleural effusions similar as on recent chest CT. There is some excreted  contrast material partially opacifying the urinary bladder. No bladder  lesion is evident. There is no hydronephrosis.     This report was finalized on 1/3/2024 11:13 AM by Dr. Gregg Gonsales M.D on Workstation: [id number]       CT Outside Films [[id number]] Resulted: 01/02/24 1252     Updated: 01/02/24 1304    Narrative:      This procedure was auto-finalized with no dictation required.    CT Outside Films [[id number]] Resulted: 01/02/24 1258     Updated: 01/02/24 1304    Narrative:      This procedure was auto-finalized with no dictation required.    XR Outside Films [[id number]] Resulted: 01/02/24 1301     Updated: 01/02/24 1304    Narrative:      This procedure was auto-finalized with no dictation required.    CT Chest Without Contrast Diagnostic [[id number]] Collected: 01/02/24 1227     Updated: 01/02/24 1256    Narrative:      CT CHEST WITHOUT CONTRAST     HISTORY: Eval nodules post thoracentesis.     TECHNIQUE: Radiation dose reduction techniques were utilized, including  automated exposure control and exposure modulation based on body size.   3 mm images were obtained through the chest without the administration  of IV contrast.     COMPARISON: CT chest 1/1/2024        FINDINGS:     Right hilar adenopathy measuring up to 1.5 cm (series 3/image 48). Left  hilar adenopathy measuring up to 1.4 cm (series 2/image 38).  Subcentimeter mediastinal lymph  nodes.     Left ventricular enlargement. Severe coronary artery calcifications.  Trace pericardial fluid. Nondilated main pulmonary artery and thoracic  aorta.     Decreased large right and unchanged small left pleural effusions.     Right lower lobe remains mostly collapsed. Left upper lobe 4.5 x 3.6 cm  consolidation (series 3/image 21) previously 3.9 x 3.3 cm with  remeasurement. Right upper lobe 6.5 x 3.6 cm consolidation (series  3/image 32) previously 6.7 x 3.8 cm with remeasurement. Unchanged  bilateral central groundglass opacities and mild left lower lobe  interlobular septal thickening.     Cirrhotic liver morphology with upper abdominal portosystemic  collaterals, small volume abdominal ascites and splenomegaly (15 cm).  Bilateral gynecomastia.          Impression:      1. Decreased large right and unchanged small left pleural effusions.  Right lower lobe remains mostly collapsed.  2. Near identical areas of consolidation the bilateral upper lobes. Both  pneumonitis/pneumonia (favored given bilaterality) and malignancy remain  in the differential. Recommend close imaging follow-up following  completion of any treatment to ensure clearing.  3. Indeterminate bilateral hilar adenopathy. Attention on follow-up.  4. Unchanged mild pulmonary edema  5. Hepatic cirrhosis with sequela of portal hypertension.     This report was finalized on 1/2/2024 12:53 PM by Dr. Kristopher Redd M.D on Workstation: [id number]        Renal Bilateral [[id number]] Collected: 01/02/24 0912     Updated: 01/02/24 0933    Narrative:      RENAL ULTRASOUND     HISTORY: Acute renal failure     COMPARISON: CT chest 1/1/2024     TECHNIQUE: Grayscale, color Doppler images of the kidneys and bladder  were obtained.     FINDINGS:  Grayscale and color Doppler images of the kidneys and bladder were  obtained.     The right kidney measures 10.8 cm in length.     The left kidney measures 12.5 cm in length.     The kidneys demonstrate increased  echogenicity and decreased cortical  thickness. There is no hydronephrosis. A hypoechoic lesion within the  superior aspect of the left kidney measures up to 5.4 cm. The visualized  portion of this lesion demonstrated cystic density on recent CT. No  definite internal color Doppler flow is demonstrated on provided images.  Increased through transmission is present.     There appears to be somewhat focal area of asymmetric echogenicity  within the posterior aspect of the bladder on the right. Incidental note  is made of a cirrhotic appearance of the liver. At least moderate amount  of ascites is present within the visualized abdomen     Visualized portions of the aorta and IVC are normal in caliber and  appearance.       Impression:      1.  Asymmetric echogenicity within the posterior aspect of the bladder  on the right. The bladder is underdistended and not well evaluated;  however, underlying neoplasm cannot be excluded. Urologic consultation  is recommended.  2.  Hypoechoic left renal lesion with findings suggestive of a cyst;  however, remains incompletely characterized. Findings can be better  evaluated with CT of the abdomen to exclude neoplasm if clinically  indicated.  3.  Findings of chronic medical renal disease and renal atrophy.  4.  Other findings above.     This report was finalized on 1/2/2024 9:30 AM by Dr. Roberto Cast M.D  on Workstation: [id number]       US Thoracentesis [[id number]] Collected: 01/01/24 1607    Specimen: Body Fluid Updated: 01/01/24 1630    Narrative:      US THORACENTESIS-     INDICATIONS: Pleural effusion      FINDINGS:     Following a detailed discussion of the proposed procedure with the  patient, verbal and written informed consent was obtained.     The patient was placed in a seated upright position, final timeout was  performed verifying patient identity and procedure, using ultrasound  guidance a location was chosen over the right aspect of the chest,  revealing pleural  effusion. The overlying skin was cleaned and  anesthetized, and a thoracentesis needle set was advanced into the  pleural space revealing clear yellow fluid. 1400 cc was removed and sent  to the laboratory for further evaluation. The catheter was removed  intact.     The patient remained asymptomatic throughout the procedure.             Impression:         Successful right thoracentesis.           This report was finalized on 1/1/2024 4:08 PM by Dr. Gerardo Mendiola M.D on Workstation: [id number]       CT Chest Without Contrast Diagnostic [[id number]] Collected: 01/01/24 1059     Updated: 01/01/24 1111    Narrative:      CT CHEST WO CONTRAST DIAGNOSTIC-     INDICATIONS: Possible lung mass, pleural effusion     TECHNIQUE: Radiation dose reduction techniques were utilized, including  automated exposure control and exposure modulation based on body size.  Unenhanced CHEST CT     COMPARISON: Correlated with chest x-ray from 12/21/2023     FINDINGS:           The heart size is borderline without pericardial effusion. Prominence of  caliber of central pulmonary arteries may reflect pulm arterial  hypertension. Mediastinal lymph nodes are mildly enlarged, for example  1.1 cm short axis, axial image 32, AP window lymph node, 1.2 cm short  axis, image 33, nonspecific, could be reactive in nature or may be  evidence of metastatic disease. Bilateral gynecomastia is conspicuous.     The airways appear clear.     Large right, minimal left pleural effusions are seen.        The lungs show masslike densities in both lungs, with considerations  including neoplasm, rounded atelectasis/pneumonia, initial further  evaluation with PET/CT may be helpful. For example, pleural-based  masslike density at the right apex on axial image 27 measures 4.8 cm,  and another at the left apex measures 4.3 cm on image 13. Dense  atelectasis/consolidation is seen in the right middle and lower lobes,  possibility of underlying lesions or pneumonia  not excluded.  Pleural-based nodular density at the left upper lobe measures 1.1 cm on  image 31.     Upper abdominal structures show at least moderate abdominal ascites,  partly included. Nodular contour of the liver is noted, compatible  cirrhosis. Gallbladder is surgically absent. Left renal cystic lesion is  partly included. Multifocal densities in the intra-abdominal fat, for  example axial image 100, could be result of edema but may represent  omental implants/metastatic disease if there is underlying malignancy.  Splenic venous tortuosity is noted. Nonspecific adenopathy is apparent  in the upper abdomen. For example, gastrohepatic ligament lymph node on  axial image 98 measures 1.5 cm short axis     Degenerative changes are seen in the spine. Deformity of proximal left  humerus suspected to be chronic, but only partly included, correlate  clinically.          Impression:         Large right, minimal left pleural effusions. Masslike densities in both  lungs, as well as nodularity and atelectasis/consolidation, follow-up  evaluation advised. Nonspecific adenopathy as well as possible omental  implants (versus edema in the mesenteric fat). Abdominal ascites.  Hepatic cirrhosis.     This report was finalized on 1/1/2024 11:08 AM by Dr. Gerardo Mendiola M.D on Workstation: [email or web address]       XR Chest PA & Lateral [[id number]] Collected: 12/31/23 1435     Updated: 12/31/23 1439    Narrative:      PA AND LATERAL CHEST X-RAY     HISTORY: Pleural effusion. Shortness of breath.     Chest x-ray consisting of 4 images is provided. Correlation: None.     FINDINGS: The cardiomediastinal silhouette is normal. The lungs are  clear. Large right pleural effusion with opacification of the caudad 50%  of the right hemithorax. No pleural effusion on the left. The cardiac  silhouette appears enlarged pulmonary vasculature is mildly engorged.  The costophrenic sulci are dry and the bones appear normal. There is no  pneumothorax.        Impression:      Large right pleural effusion.     This report was finalized on 12/31/2023 2:36 PM by Dr. Gregg Gonsales M.D on Workstation: [id number]               Results for orders placed during the hospital encounter of 12/29/23    Adult Transthoracic Echo Complete W/ Cont if Necessary Per Protocol    Interpretation Summary  •  Left ventricular ejection fraction appears to be 31 - 35%.  •  Left ventricular wall thickness is consistent with mild concentric hypertrophy.  •  The following left ventricular wall segments are hypokinetic: mid anterior, mid inferolateral and mid inferoseptal. The following left ventricular wall segments are akinetic: apical anterior, apical lateral, apical inferior, apical septal and apex.  Regional wall motion abnormality suggestive of  stress-induced cardiomyopathy versus multivessel coronary artery disease.  •  Left ventricular diastolic function is consistent with (grade I) impaired relaxation.  •  The left atrial cavity is moderately dilated.  •  Saline test results are negative for right to left atrial level shunt.  •  Calculated right ventricular systolic pressure from tricuspid regurgitation is 45 mmHg.    Pertinent Labs     Results from last 7 days   Lab Units 01/07/24 0445 01/06/24 0352 01/05/24 0257 01/04/24  0503   WBC 10*3/mm3 7.01 6.26 6.30 6.40   HEMOGLOBIN g/dL 11.1* 10.7* 10.1* 10.2*   PLATELETS 10*3/mm3 134* 114* 114* 104*     Results from last 7 days   Lab Units 01/07/24 0445 01/06/24 0352 01/05/24 0257 01/04/24  0503   SODIUM mmol/L 138 142 138 139   POTASSIUM mmol/L 4.3 4.5 4.7 4.7   CHLORIDE mmol/L 105 108* 108* 110*   CO2 mmol/L 24.5 23.3 23.7 20.0*   BUN mg/dL 53* 54* 53* 50*   CREATININE mg/dL 1.60* 1.64* 1.72* 1.89*   GLUCOSE mg/dL 90 88 82 100*   Estimated Creatinine Clearance: 36.2 mL/min (A) (by C-G formula based on SCr of 1.6 mg/dL (H)).  Results from last 7 days   Lab Units 01/07/24 0445 01/06/24 0352 01/05/24 0257 01/04/24  0503  01/03/24  0327   ALBUMIN g/dL 2.4* 2.4* 2.3* 2.4* 2.4*   AST (SGOT) U/L  --   --   --   --  17   ALT (SGPT) U/L  --   --   --   --  13     Results from last 7 days   Lab Units 01/07/24  0445 01/06/24  0352 01/05/24  0257 01/04/24  0503   CALCIUM mg/dL 8.3* 7.8* 8.1* 7.8*   ALBUMIN g/dL 2.4* 2.4* 2.3* 2.4*   MAGNESIUM mg/dL  --   --  2.3  --    PHOSPHORUS mg/dL 4.4 4.0 3.5 3.9       Results from last 7 days   Lab Units 01/01/24  0415   CK TOTAL U/L 45     Results from last 7 days   Lab Units 01/02/24  0325 01/01/24  1925   SODIUM UR mmol/L  --  53   CREATININE UR mg/dL  --  96.4   CHLORIDE UR mmol/L  --  <20   PROTEIN TOTAL URINE mg/dL  --  22.8   URIC ACID mg/dL 6.4  --      Results from last 7 days   Lab Units 01/03/24  0327   CHOLESTEROL mg/dL 79   TRIGLYCERIDES mg/dL 55   HDL CHOL mg/dL 31*   LDL CHOL mg/dL 34             Test Results Pending at Discharge       Discharge Details        Discharge Medications        New Medications        Instructions Start Date   aspirin 81 MG EC tablet   81 mg, Oral, Daily   Start Date: January 8, 2024     carvedilol 12.5 MG tablet  Commonly known as: COREG   12.5 mg, Oral, 2 Times Daily With Meals      cefdinir 300 MG capsule  Commonly known as: OMNICEF   300 mg, Oral, 2 Times Daily             Changes to Medications        Instructions Start Date   furosemide 40 MG tablet  Commonly known as: Lasix  What changed:   medication strength  See the new instructions.   40 mg, Oral, 2 Times Daily             Continue These Medications        Instructions Start Date   allopurinol 100 MG tablet  Commonly known as: ZYLOPRIM   100 mg, Oral, Daily      montelukast 10 MG tablet  Commonly known as: SINGULAIR   10 mg, Oral, Nightly             Stop These Medications      atenolol 25 MG tablet  Commonly known as: TENORMIN     metFORMIN 500 MG tablet  Commonly known as: GLUCOPHAGE     spironolactone 25 MG tablet  Commonly known as: ALDACTONE     warfarin 3 MG tablet  Commonly known as:  COUMADIN              No Known Allergies    Discharge Disposition:  Home-Health Care Svc      Discharge Diet:  Diet Order   Procedures   • Diet: Cardiac Diets; Healthy Heart (2-3 Na+); Texture: Regular Texture (IDDSI 7); Fluid Consistency: Thin (IDDSI 0)       Discharge Activity:   Activity Instructions    Activity as tolerated           CODE STATUS:    Code Status and Medical Interventions:   Ordered at: 12/29/23 0340     Code Status (Patient has no pulse and is not breathing):    CPR (Attempt to Resuscitate)     Medical Interventions (Patient has pulse or is breathing):    Full Support       No future appointments.  Additional Instructions for the Follow-ups that You Need to Schedule       Ambulatory Referral to Home Health ([facility])   As directed      Face to Face Visit Date: 1/7/2024   Follow-up provider for Plan of Care?: I treated the patient in an acute care facility and will not continue treatment after discharge.   Follow-up provider: LAN DAIGLE JR. [[id number]]   Reason/Clinical Findings: Bilateral lung masses, pleural effusion, PIPER   Describe mobility limitations that make leaving home difficult: Generalized weakness   Nursing/Therapeutic Services Requested: Skilled Nursing Occupational Therapy Physical Therapy   Skilled nursing orders: Medication education   PT orders: Therapeutic exercise Gait Training Transfer training Strengthening   Weight Bearing Status: As Tolerated   Occupational orders: Activities of daily living Energy conservation Strengthening   Frequency: 1 Week 1               Contact information for follow-up providers       Lan Daigle Jr.. Schedule an appointment as soon as possible for a visit in 1 week(s).    Specialty: Family Medicine  Why: Repeat BMP in 1 week to monitor renal function  Contact information:  [address] [place] [id number]  [phone]               Ignacia Degroot APRN. Schedule an appointment as soon as possible for a visit in 3 week(s).    Specialty:  Urology  Why: Follow-up with urology in 3-4 weeks for cystoscopy to evaluate bladder mass  Contact information:  [address]  [facility] [address]  [phone]                       Contact information for after-discharge care       Home Medical Care       [facility] .    Service: Home Health Services  Contact information:  Leonides Serrano [facility] [address]  [phone]                                   Additional Instructions for the Follow-ups that You Need to Schedule       Ambulatory Referral to Home Health (Hospital)   As directed      Face to Face Visit Date: 1/7/2024   Follow-up provider for Plan of Care?: I treated the patient in an acute care facility and will not continue treatment after discharge.   Follow-up provider: MIKY DAIGLE JR. [[id number]]   Reason/Clinical Findings: Bilateral lung masses, pleural effusion, PIPER   Describe mobility limitations that make leaving home difficult: Generalized weakness   Nursing/Therapeutic Services Requested: Skilled Nursing Occupational Therapy Physical Therapy   Skilled nursing orders: Medication education   PT orders: Therapeutic exercise Gait Training Transfer training Strengthening   Weight Bearing Status: As Tolerated   Occupational orders: Activities of daily living Energy conservation Strengthening   Frequency: 1 Week 1            Time Spent on Discharge:  Greater than 30 minutes      Leyla Espinal MD  [facility]  01/07/24  10:56 EST

## 2024-01-07 NOTE — PROGRESS NOTES
"[facility] Clinical Pharmacy Services: Warfarin Dosing/Monitoring Consult    Derian Hendricks is a 89 y.o. male, estimated creatinine clearance is 35.3 mL/min (A) (by C-G formula based on SCr of 1.64 mg/dL (H)). weighing 98.5 kg (217 lb 3.2 oz).    Results from last 7 days   Lab Units 01/06/24  1827 01/06/24  0352 01/05/24  0257 01/04/24  0503 01/03/24  0327 01/02/24  0325 01/01/24  0415 12/31/23  1005   INR  1.34*  --   --   --  1.55* 1.70* 2.16* 2.40*   HEMOGLOBIN g/dL  --  10.7* 10.1* 10.2* 10.2* 10.4* 10.4*  --    HEMATOCRIT %  --  31.6* 31.6* 29.8* 31.4* 30.0* 30.3*  --    PLATELETS 10*3/mm3  --  114* 114* 104* 114* 101* 97*  --      Prior to admission anticoagulation: Patient receiving warfarin for unknown reason (PAF/LV thrombus possibly?).  Although medications say warfarin 3 mg, \"0.5 mg\" daily, I would assume this really should be 1.5 mg daily.     Hospital Anticoagulation:  Consulting provider: Dr. Espinal  Start date: 1/6/24  Indication:  PAF/LV thrombus possibly?  Target INR: 2 - 3  Expected duration: TBD   Bridge Therapy: No      Potential food or drug interactions:   Increased INR when taking >2000 mg of acetaminophen    Education complete?/Date: No; plan for follow up TBD    Assessment/Plan:  Dose: 4.5 mg x1 dose today to give a boosted dose of warfarin and will start intermittently dosing warfarin given warfarin dosing history not entirely known/age/indication.    Monitor for any signs or symptoms of bleeding  Follow up daily INRs and dose adjustments    Pharmacy will continue to follow until discharge or discontinuation of warfarin.     Jackson Licona, PharmD, BCPS, BCOP  Clinical Staff Pharmacist      "

## 2024-01-07 NOTE — CASE MANAGEMENT/SOCIAL WORK
Case Management Discharge Note      Final Note: pt dc'd to home with [facility] (confirmed with [facility]) and [facility] for home O2    Provided Post Acute Provider List?: N/A  Provided Post Acute Provider Quality & Resource List?: N/A    Selected Continued Care - Discharged on 1/7/2024 Admission date: 12/29/2023 - Discharge disposition: Home-Health Care Svc      Destination    No services have been selected for the patient.                Durable Medical Equipment    No services have been selected for the patient.                Dialysis/Infusion    No services have been selected for the patient.                Home Medical Care Coordination complete.      Service Provider Selected Services Address Phone Fax Patient Preferred    [facility] [address] [place] [address] [phone] -- --              Therapy    No services have been selected for the patient.                Community Resources    No services have been selected for the patient.                Community & DME    No services have been selected for the patient.                    Transportation Services  Private: Car    Final Discharge Disposition Code: 06 - home with home health care

## 2024-01-07 NOTE — PROGRESS NOTES
"   LOS: 9 days     Chief Complaint/ Reason for encounter: PIPER/CKD, abnormal renal ultrasound    Subjective   01/02/24 : He is doing about the same today, denies complaints  Shortness of breath improved after 1.4 L thoracentesis  Now with lung nodules concerning for possible lung cancer and possible malignant pleural effusion  No edema.  He was started on IV fluids in preparation for heart cath but that is now on hold and his diet has been restarted    1/3 patient had a difficult night with sob. Still feeling sob. Not talking much.     1/4: He is feeling a little better today.  Creatinine up slightly  He reports less shortness of breath after the thoracentesis and with diuresis.  Only about 800 cc of urine recorded  Good appetite, no nausea or vomiting    1/5: No new complaints or events today.  He is a bit less short of breath  Still with some edema  No fevers or chills, no cough  Good appetite without nausea    1/6 patient reports he is feeling better. Sob has improved. Per patient and daughter, his edema has improved as well.     1/7 no acute events. Patient feeling better. No sob. No chest pain     Medical history reviewed:  History of Present Illness    Subjective    History taken from: Patient and chart    Vital Signs  Temp:  [97.5 °F (36.4 °C)-98.9 °F (37.2 °C)] 98.1 °F (36.7 °C)  Heart Rate:  [58-68] 61  Resp:  [16] 16  BP: (106-122)/(47-68) 122/58       Wt Readings from Last 1 Encounters:   01/06/24 0639 98.5 kg (217 lb 3.2 oz)   01/05/24 0527 98.2 kg (216 lb 8 oz)   12/29/23 0314 98.8 kg (217 lb 14.4 oz)       Objective:  Vital signs: (most recent): Blood pressure 110/66, pulse 64, temperature 98 °F (36.7 °C), temperature source Oral, resp. rate 16, height 175.3 cm (69\"), weight 98.2 kg (216 lb 6.4 oz), SpO2 93%.                Objective:  General Appearance: Chronically ill-appearing, in no acute distress. Siting edge of bed, eating. Looks much better   HEENT: Mucous membranes moist, no injury, oropharynx " clear  Lungs: Diminished breath sounds but clear  Heart: Normal rate.  Regular rhythm.  S1, S2 normal.  No murmur.   Abdomen: Abdomen is soft.  Bowel sounds are normal, no abdominal tenderness.  There is no rebound or guarding  Extremities: Trace edema of bilateral lower extremities  Neurological: No focal motor or sensory deficits, pupils reactive  Skin:  Warm and dry.  No rash or cyanosis.       Results Review:    Intake/Output:     Intake/Output Summary (Last 24 hours) at 1/7/2024 0621  Last data filed at 1/6/2024 1608  Gross per 24 hour   Intake 700 ml   Output 250 ml   Net 450 ml         DATA:  Radiology and Labs:  The following labs independently reviewed by me. Additional labs ordered for tomorrow a.m.  Interval notes, chart personally reviewed by me.   Old records independently reviewed showing unknown CKD.  Awaiting records    Discussed with patient himself at bedside    Risk/ complexity of medical care/ medical decision making moderate complexity, abnormal renal imaging, possible new lung cancer, need for repeat thoracentesis      Labs:   Recent Results (from the past 24 hour(s))   Protime-INR    Collection Time: 01/06/24  6:27 PM    Specimen: Blood   Result Value Ref Range    Protime 16.7 (H) 11.7 - 14.2 Seconds    INR 1.34 (H) 0.90 - 1.10   CBC (No Diff)    Collection Time: 01/07/24  4:45 AM    Specimen: Blood   Result Value Ref Range    WBC 7.01 3.40 - 10.80 10*3/mm3    RBC 3.45 (L) 4.14 - 5.80 10*6/mm3    Hemoglobin 11.1 (L) 13.0 - 17.7 g/dL    Hematocrit 33.7 (L) 37.5 - 51.0 %    MCV 97.7 (H) 79.0 - 97.0 fL    MCH 32.2 26.6 - 33.0 pg    MCHC 32.9 31.5 - 35.7 g/dL    RDW 14.6 12.3 - 15.4 %    RDW-SD 52.0 37.0 - 54.0 fl    MPV 11.2 6.0 - 12.0 fL    Platelets 134 (L) 140 - 450 10*3/mm3   Renal Function Panel    Collection Time: 01/07/24  4:45 AM    Specimen: Blood   Result Value Ref Range    Glucose 90 65 - 99 mg/dL    BUN 53 (H) 8 - 23 mg/dL    Creatinine 1.60 (H) 0.76 - 1.27 mg/dL    Sodium 138 136 -  145 mmol/L    Potassium 4.3 3.5 - 5.2 mmol/L    Chloride 105 98 - 107 mmol/L    CO2 24.5 22.0 - 29.0 mmol/L    Calcium 8.3 (L) 8.6 - 10.5 mg/dL    Albumin 2.4 (L) 3.5 - 5.2 g/dL    Phosphorus 4.4 2.5 - 4.5 mg/dL    Anion Gap 8.5 5.0 - 15.0 mmol/L    BUN/Creatinine Ratio 33.1 (H) 7.0 - 25.0    eGFR 40.9 (L) >60.0 mL/min/1.73   Protime-INR    Collection Time: 01/07/24  4:45 AM    Specimen: Blood   Result Value Ref Range    Protime 16.4 (H) 11.7 - 14.2 Seconds    INR 1.30 (H) 0.90 - 1.10       Radiology:  Pertinent radiology studies were reviewed as described above      Medications have been reviewed separately in chart overview      ASSESSMENT:  CKD stage IIIb, fairly stable and near baseline, tolerating diuresis well  PIPER, stabilizing    Acute respiratory failure with hypoxia, improved after thoracentesis/diuresis    Chest pain  Bladder lesion    Pleural effusion, possibly malignant    HTN (hypertension), controlled    Shortness of breath    Lung mass, highly concerning for primary malignancy    Elevated troponin    Atrial fibrillation    Anemia    Thrombocytopenia  Bladder mass, not seen on CT.  Family wants to hold off on cystoscopy  New findings of cirrhosis with ascites        DISCUSSION/PLAN:   Renal function stable with Cr of 1.6 for the last several days   He seems to be tolerating the IV diuresis well so far  Will continue IV Lasix with plans to transition to oral Lasix, 40 twice daily at discharge  BP at goal, on Coreg  Family does not want any additional invasive procedures done at this time including biopsies/bronch/cystoscopy    I think it is reasonable to continue IV diuretics, mainly for comfort at this time    Will follow-up a.m. labs  Discussed with family at bedside  Continue to monitor electrolytes and volume closely    OK to DC from renal standpoint        Janice Shay MD  [facility]   Office phone number: [phone]  Answering service phone number: [phone]    01/07/24  06:21  EST

## 2024-01-07 NOTE — CASE MANAGEMENT/SOCIAL WORK
Continued Stay Note  [facility]     Patient Name: Derian Hendricks  MRN: [id number]  Today's Date: 1/7/2024    Admit Date: 12/29/2023    Plan: Home via private auto, [facility] to follow and [facility] to supply pt's new home O2 requirements   Discharge Plan       Row Name 01/07/24 1509       Plan    Final Discharge Disposition Code 06 - home with home health care    Final Note pt dc'd to home with [facility] (confirmed with L[facility]ne/[facility]) and [facility] for home O2      Row Name 01/07/24 1232       Plan    Plan Home via private auto, [facility] to follow and [facility] to supply pt's new home O2 requirements    Patient/Family in Agreement with Plan yes    Plan Comments Inbound call from staff RN who stated pt is discharging today and wanted to make sure there was nothing more pt needed from [facility]. Per RN, family brought portable O2 tanks to get pt home. Chart reviewed. Called and left a message for on-call RN with [facility] to update them of pt's discharge. Called and spoke with [facility] and got a fax number to send pt's walking oximetry note, MD order and DC summary as requested. Waiting for return call from [facility]........JW                   Discharge Codes    No documentation.                 Expected Discharge Date and Time       Expected Discharge Date Expected Discharge Time    Jan 7, 2024               Karlene Price, RN

## 2024-01-07 NOTE — PROGRESS NOTES
[facility] INPATIENT PROGRESS NOTE         [facility] CVI    2024      PATIENT IDENTIFICATION:  Name: Derian Hendricks ADMIT: 2023   : 1934  PCP: Lan De Dios Jr.    MRN: [id number] LOS: 9 days   AGE/SEX: 89 y.o. male  ROOM: [facility]                     LOS 9    Reason for visit: PNA and effusion      SUBJECTIVE:      No new pulmonary issues overnight.      Objective   OBJECTIVE:    Vital Sign Min/Max for last 24 hours  Temp  Min: 98 °F (36.7 °C)  Max: 98.9 °F (37.2 °C)   BP  Min: 106/60  Max: 122/58   Pulse  Min: 61  Max: 68   Resp  Min: 16  Max: 16   SpO2  Min: 93 %  Max: 98 %   No data recorded   Weight  Min: 98.2 kg (216 lb 6.4 oz)  Max: 98.2 kg (216 lb 6.4 oz)    Vitals:    24 2342 24 0420 24 0628 24 0827   BP: 106/60 122/58  110/66   BP Location: Right arm Right arm  Right arm   Patient Position: Lying Lying  Lying   Pulse: 68 61  64   Resp: 16 16  16   Temp: 98.2 °F (36.8 °C) 98.1 °F (36.7 °C)  98 °F (36.7 °C)   TempSrc: Oral Oral  Oral   SpO2: 95% 97%  93%   Weight:   98.2 kg (216 lb 6.4 oz)    Height:                24  0527 24  0639 24   Weight: 98.2 kg (216 lb 8 oz) 98.5 kg (217 lb 3.2 oz) 98.2 kg (216 lb 6.4 oz)       Body mass index is 31.96 kg/m².                          Body mass index is 31.96 kg/m².    Intake/Output Summary (Last 24 hours) at 2024 0934  Last data filed at 2024 0827  Gross per 24 hour   Intake 820 ml   Output 450 ml   Net 370 ml         Exam:  GEN:  No distress, appears stated age  NECK:  No adenopathy, midline trachea  LUNGS: Nonlabored        Assessment     Scheduled meds:  allopurinol, 100 mg, Oral, Daily  aspirin, 81 mg, Oral, Daily  carvedilol, 12.5 mg, Oral, BID With Meals  cefTRIAXone, 1,000 mg, Intravenous, Q24H  furosemide, 40 mg, Intravenous, Q12H  senna-docusate sodium, 2 tablet, Oral, BID  sodium chloride, 10 mL, Intravenous, Q12H      IV meds:                      Pharmacy to dose  "warfarin,       Data Review:  Results from last 7 days   Lab Units 01/07/24  0445 01/06/24  0352 01/05/24  0257 01/04/24  0503 01/03/24  0327   SODIUM mmol/L 138 142 138 139 139   POTASSIUM mmol/L 4.3 4.5 4.7 4.7 5.0   CHLORIDE mmol/L 105 108* 108* 110* 109*   CO2 mmol/L 24.5 23.3 23.7 20.0* 21.8*   BUN mg/dL 53* 54* 53* 50* 54*   CREATININE mg/dL 1.60* 1.64* 1.72* 1.89* 1.62*   GLUCOSE mg/dL 90 88 82 100* 107*   CALCIUM mg/dL 8.3* 7.8* 8.1* 7.8* 8.2*         Estimated Creatinine Clearance: 36.2 mL/min (A) (by C-G formula based on SCr of 1.6 mg/dL (H)).  Results from last 7 days   Lab Units 01/07/24 0445 01/06/24  0352 01/05/24  0257 01/04/24  0503 01/03/24  0327   WBC 10*3/mm3 7.01 6.26 6.30 6.40 6.49   HEMOGLOBIN g/dL 11.1* 10.7* 10.1* 10.2* 10.2*   PLATELETS 10*3/mm3 134* 114* 114* 104* 114*     Results from last 7 days   Lab Units 01/07/24  0445 01/06/24  1827 01/03/24  0327 01/02/24  0325 01/01/24  0415   INR  1.30* 1.34* 1.55* 1.70* 2.16*     Results from last 7 days   Lab Units 01/03/24  0327   ALT (SGPT) U/L 13   AST (SGOT) U/L 17                 No results found for: \"HGBA1C\", \"POCGLU\"        Imaging reviewed  PET reviewed 1/4/24    Microbiology reviewed            Active Hospital Problems    Diagnosis  POA    **Acute respiratory failure with hypoxia [J96.01]  Unknown    Chest pain [R07.9]  Yes    Pleural effusion [J90]  Yes    CKD (chronic kidney disease) [N18.9]  Yes    HTN (hypertension) [I10]  Unknown    Shortness of breath [R06.02]  Yes    Lung mass [R91.8]  Unknown    Elevated troponin [R79.89]  Unknown    Atrial fibrillation [I48.91]  Unknown    Anemia [D64.9]  Unknown    Thrombocytopenia [D69.6]  Unknown      Resolved Hospital Problems   No resolved problems to display.         ASSESSMENT:  Bilateral pleural effusion: Trace on the left.  Large on the right.  S/p right Thora 1/1/2024 with removal of 1.4 L -> transudate indicative of CHF/volume overload +/- cirrhosis.  Cytology negative.  S/p Thora " 1/3 with removal of 2.1 L, cytology pending  Mild pulmonary edema  Bilateral lung masses.  No clear etiology.  Could represent malignancy or benign round atelectasis.  No prior CT to compare.  PET scan showed significantly PET avid masses in the upper lobes bilaterally.  The right lower lobe dependent consolidation like mass is not PET avid.  PET avid prostate nodule  Cirrhosis on CT images with ascites  Borderline mediastinal adenopathies specifically station 4s.  Slightly PET avid.  Acute systolic CHF, EF 30-35%.  Pulmonary HTN, RVSP 45 on echo 12/29 with moderately dilated LA consistent with group 2.  Non-STEMI type I  Anticoagulation with Coumadin.  No clear indication  History of CAD  PIPER  Anemia and thrombocytopenia, unknown chronicity.      PLAN:  Family and pt refused bronchoscopy.  Plan repeat imaging as out patient for f/u lung mass.  Family wishes to be conservative at his age.  Continue antibiotics. And encourage pulmonary toilet.   Following peripherally for pulmonary issues.  Defer all other medical management to hospitalist service.      Venkat Morrison MD  Pulmonary and Critical Care Medicine  [facility], [facility]  1/7/2024    09:34 EST

## 2024-01-07 NOTE — DISCHARGE INSTRUCTIONS
Notify your primary care provider if you experience chest pain, difficulty breathing, fevers/chills, nausea or vomiting, bleeding in stool, excessive diarrhea, numbness or weakness or tingling in any part of your body.      Bilateral lung masses.   Will need repeat CT scan of the chest in 2 months to monitor

## 2024-01-07 NOTE — OUTREACH NOTE
Prep Survey      Flowsheet Row Responses   [facility] facility patient discharged from? [facility]   Is LACE score < 7 ? No   Eligibility Readm Mgmt   Discharge diagnosis acute hypoxic resp failure, bilateral pleural effusion & ling masses, Acute CHF   Does the patient have one of the following disease processes/diagnoses(primary or secondary)? CHF   Does the patient have Home health ordered? Yes   What is the Home health agency?  [facility]   Is there a DME ordered? Yes   What DME was ordered? [facility] for home O2   Prep survey completed? Yes            Dayana DIXON - Registered Nurse

## 2024-01-07 NOTE — CASE MANAGEMENT/SOCIAL WORK
Continued Stay Note  [facility]     Patient Name: Derian Hendricks  MRN: [id number]  Today's Date: 1/7/2024    Admit Date: 12/29/2023    Plan: Home via private auto, [facility] to follow and [facility] to supply pt's new home O2 requirements   Discharge Plan       Row Name 01/07/24 1232       Plan    Plan Home via private auto, [facility] to follow and [facility] to supply pt's new home O2 requirements    Patient/Family in Agreement with Plan yes    Plan Comments Inbound call from staff RN who stated pt is discharging today and wanted to make sure there was nothing more pt needed from [facility]. Per RN, family brought portable O2 tanks to get pt home. Chart reviewed. Called and left a message for on-call RN with [facility] to update them of pt's discharge. Called and spoke with [facility] and got a fax number to send pt's walking oximetry note, MD order and DC summary as requested. Waiting for return call from [facility]........JW                   Discharge Codes    No documentation.                 Expected Discharge Date and Time       Expected Discharge Date Expected Discharge Time    Jan 7, 2024               Karlene Price, RN

## 2024-01-08 ENCOUNTER — READMISSION MANAGEMENT (OUTPATIENT)
Dept: CALL CENTER | Facility: HOSPITAL | Age: 89
End: 2024-01-08
Payer: MEDICARE

## 2024-01-08 NOTE — OUTREACH NOTE
CHF Week 1 Survey      Flowsheet Row Responses   [facility], [place], operated by [facility] patient discharged from? [place]   Does the patient have one of the following disease processes/diagnoses(primary or secondary)? CHF   CHF Week 1 attempt successful? Yes   Call start time 1137   Call end time 1145   Discharge diagnosis acute hypoxic resp failure, bilateral pleural effusion & ling masses, Acute CHF   Is patient permission given to speak with other caregiver? Yes   List who call center can speak with dtrs   Person spoke with today (if not patient) and relationship Marga   Medication alerts for this patient Pt's dtrs manage/organize his medications, per dtr's report.   Meds reviewed with patient/caregiver? Yes   Is the patient having any side effects they believe may be caused by any medication additions or changes? No   Does the patient have all medications ordered at discharge? No   Nursing Interventions No intervention needed   Prescription comments Per dtr, they were unable to get the pt's DC medications upon DC yesterday due to the pharmacy being closed. They plan to pick the prescriptions up today, dtr reports.   Is the patient taking all medications as directed (includes completed medication regime)? No   What is preventing the patient from taking all medications as directed? Other   Nursing Interventions Nurse provided patient education   Comments regarding appointments Nurse advised Marga of referral to [facility], dtr reports that they will not come back to [place] for f/u care as they are 2hrs away. Pt's information sent to [facility] at [facility].   Does the patient have a primary care provider?  Yes   Does the patient have an appointment with their PCP within 7 days of discharge? Yes   Has the patient kept scheduled appointments due by today? N/A   What is the Home health agency?  [facility]    Has home health visited the patient within 72 hours of discharge? Call prior to 72 hours   What DME was ordered? [facility]  for home O2   Has all DME been delivered? Yes   DME comments home O2 @ 2L at night and activity   Pulse Ox monitoring None   Comments Per dtr the pt is doing well at home.   Did the patient receive a copy of their discharge instructions? Yes   Nursing interventions Reviewed instructions with patient   What is the patient's perception of their health status since discharge? Improving   Nursing interventions Nurse provided patient education   Is the patient able to teach back signs and symptoms of worsening condition? (i.e. weight gain, shortness of air, etc.) Yes    CHF Week 1 call completed? Yes   Revoked No further contact(revokes)-requires comment   Wrap up additional comments quick call with dtr Marga   Call end time [phone]            Anna Marie HARE - Registered Nurse